# Patient Record
Sex: FEMALE | Race: BLACK OR AFRICAN AMERICAN | NOT HISPANIC OR LATINO | Employment: OTHER | URBAN - METROPOLITAN AREA
[De-identification: names, ages, dates, MRNs, and addresses within clinical notes are randomized per-mention and may not be internally consistent; named-entity substitution may affect disease eponyms.]

---

## 2017-08-09 ENCOUNTER — HOSPITAL ENCOUNTER (INPATIENT)
Facility: HOSPITAL | Age: 57
LOS: 3 days | Discharge: HOME | DRG: 607 | End: 2017-08-12
Attending: EMERGENCY MEDICINE | Admitting: HOSPITALIST
Payer: MEDICAID

## 2017-08-09 DIAGNOSIS — M79.3 PANNICULITIS: Primary | ICD-10-CM

## 2017-08-09 DIAGNOSIS — B37.2 CANDIDAL INTERTRIGO: ICD-10-CM

## 2017-08-09 DIAGNOSIS — Z78.9 FAILURE OF OUTPATIENT TREATMENT: ICD-10-CM

## 2017-08-09 LAB
ALBUMIN SERPL BCP-MCNC: 3.1 G/DL
ALP SERPL-CCNC: 311 U/L
ALT SERPL W/O P-5'-P-CCNC: 6 U/L
ANION GAP SERPL CALC-SCNC: 17 MMOL/L
AST SERPL-CCNC: 54 U/L
BACTERIA #/AREA URNS HPF: ABNORMAL /HPF
BASOPHILS # BLD AUTO: 0.02 K/UL
BASOPHILS NFR BLD: 0.2 %
BILIRUB SERPL-MCNC: 1.6 MG/DL
BILIRUB UR QL STRIP: NEGATIVE
BUN SERPL-MCNC: 7 MG/DL
CALCIUM SERPL-MCNC: 8.9 MG/DL
CHLORIDE SERPL-SCNC: 96 MMOL/L
CLARITY UR: ABNORMAL
CO2 SERPL-SCNC: 23 MMOL/L
COLOR UR: YELLOW
CREAT SERPL-MCNC: 0.8 MG/DL
DIFFERENTIAL METHOD: ABNORMAL
EOSINOPHIL # BLD AUTO: 0.1 K/UL
EOSINOPHIL NFR BLD: 0.6 %
ERYTHROCYTE [DISTWIDTH] IN BLOOD BY AUTOMATED COUNT: 16.8 %
EST. GFR  (AFRICAN AMERICAN): >60 ML/MIN/1.73 M^2
EST. GFR  (NON AFRICAN AMERICAN): >60 ML/MIN/1.73 M^2
GLUCOSE SERPL-MCNC: 92 MG/DL
GLUCOSE UR QL STRIP: NEGATIVE
HCT VFR BLD AUTO: 25.2 %
HGB BLD-MCNC: 8.5 G/DL
HGB UR QL STRIP: ABNORMAL
KETONES UR QL STRIP: ABNORMAL
LEUKOCYTE ESTERASE UR QL STRIP: ABNORMAL
LYMPHOCYTES # BLD AUTO: 2.8 K/UL
LYMPHOCYTES NFR BLD: 32.9 %
MCH RBC QN AUTO: 37.8 PG
MCHC RBC AUTO-ENTMCNC: 33.7 G/DL
MCV RBC AUTO: 112 FL
MICROSCOPIC COMMENT: ABNORMAL
MONOCYTES # BLD AUTO: 0.6 K/UL
MONOCYTES NFR BLD: 7.7 %
NEUTROPHILS # BLD AUTO: 4.9 K/UL
NEUTROPHILS NFR BLD: 58.6 %
NITRITE UR QL STRIP: NEGATIVE
PH UR STRIP: 6 [PH] (ref 5–8)
PLATELET # BLD AUTO: 130 K/UL
PMV BLD AUTO: 10.4 FL
POTASSIUM SERPL-SCNC: 4 MMOL/L
PROT SERPL-MCNC: 7.4 G/DL
PROT UR QL STRIP: NEGATIVE
RBC # BLD AUTO: 2.25 M/UL
RBC #/AREA URNS HPF: 1 /HPF (ref 0–4)
SODIUM SERPL-SCNC: 136 MMOL/L
SP GR UR STRIP: 1.02 (ref 1–1.03)
SQUAMOUS #/AREA URNS HPF: 3 /HPF
URN SPEC COLLECT METH UR: ABNORMAL
UROBILINOGEN UR STRIP-ACNC: ABNORMAL EU/DL
WBC # BLD AUTO: 8.36 K/UL
WBC #/AREA URNS HPF: 6 /HPF (ref 0–5)

## 2017-08-09 PROCEDURE — 12000002 HC ACUTE/MED SURGE SEMI-PRIVATE ROOM

## 2017-08-09 PROCEDURE — 96367 TX/PROPH/DG ADDL SEQ IV INF: CPT

## 2017-08-09 PROCEDURE — 81000 URINALYSIS NONAUTO W/SCOPE: CPT

## 2017-08-09 PROCEDURE — 85025 COMPLETE CBC W/AUTO DIFF WBC: CPT

## 2017-08-09 PROCEDURE — 96365 THER/PROPH/DIAG IV INF INIT: CPT

## 2017-08-09 PROCEDURE — 96366 THER/PROPH/DIAG IV INF ADDON: CPT

## 2017-08-09 PROCEDURE — 63600175 PHARM REV CODE 636 W HCPCS: Performed by: PHYSICIAN ASSISTANT

## 2017-08-09 PROCEDURE — 25000003 PHARM REV CODE 250: Performed by: PHYSICIAN ASSISTANT

## 2017-08-09 PROCEDURE — 80053 COMPREHEN METABOLIC PANEL: CPT

## 2017-08-09 PROCEDURE — 99285 EMERGENCY DEPT VISIT HI MDM: CPT | Mod: 25

## 2017-08-09 RX ORDER — AMLODIPINE BESYLATE 10 MG/1
10 TABLET ORAL DAILY
COMMUNITY
End: 2017-10-14 | Stop reason: ALTCHOICE

## 2017-08-09 RX ORDER — ESCITALOPRAM OXALATE 20 MG/1
20 TABLET ORAL DAILY
COMMUNITY

## 2017-08-09 RX ADMIN — VANCOMYCIN HYDROCHLORIDE 1750 MG: 1 INJECTION, POWDER, LYOPHILIZED, FOR SOLUTION INTRAVENOUS at 06:08

## 2017-08-09 RX ADMIN — PIPERACILLIN SODIUM AND TAZOBACTAM SODIUM 4.5 G: 4; .5 INJECTION, POWDER, FOR SOLUTION INTRAVENOUS at 05:08

## 2017-08-09 NOTE — ED TRIAGE NOTES
"Reports taking "fld pill". Here today  C/o c/o wound to groin and lower abd. ALSo c/o lower back pain.  Visited urgent care 5 days ago. Med prescribed. (nystatin). Med stopped bleeding.   "

## 2017-08-09 NOTE — ED PROVIDER NOTES
Encounter Date: 8/9/2017    SCRIBE #1 NOTE: I, Gabrielle Hughes, am scribing for, and in the presence of,  Antonio Sears PA-C. I have scribed the following portions of the note - Other sections scribed: HPI and ROS.       History     Chief Complaint   Patient presents with    Wound Infection     States she has a wound on her stomach that started as a sore and has gotten bigger     CC: Wound Infection    HPI: This 57 y.o. female with medical history of depression and hypertension presents to the ED c/o a wound infection to the abdomen for the past 1x week. Pt reports that she was recently evaluated at an Urgent Care facility for the symptoms on Friday (8/4/17) and notes that she was prescribed antibiotics (bactrim) as well as nystatin for treatment without any relief as the symptoms are worsening. She adds that she is also experiencing cramping pain to the back. Symptoms are acute, constant and mild (2/10). Pt notes that she has never experienced these symptoms before. Pt denies dysuria and history of diabetes mellitus. No other associated symptoms.           The history is provided by the patient. No  was used.     Review of patient's allergies indicates:  No Known Allergies  Past Medical History:   Diagnosis Date    Depression     Hypertension      Past Surgical History:   Procedure Laterality Date    HYSTERECTOMY      TOTAL HIP ARTHROPLASTY       History reviewed. No pertinent family history.  Social History   Substance Use Topics    Smoking status: Former Smoker    Smokeless tobacco: Never Used    Alcohol use Yes      Comment: QOD     Review of Systems   Constitutional: Negative for fever.   HENT: Negative for sore throat.    Respiratory: Negative for shortness of breath.    Cardiovascular: Negative for chest pain.   Gastrointestinal: Negative for nausea.   Genitourinary: Negative for dysuria.   Musculoskeletal: Positive for back pain (cramping).   Skin: Positive for wound (to the  abdomen). Negative for rash.   Neurological: Negative for weakness.       Physical Exam     Initial Vitals [08/09/17 1454]   BP Pulse Resp Temp SpO2   127/85 103 18 98.6 °F (37 °C) 95 %      MAP       99         Physical Exam    Nursing note and vitals reviewed.  Constitutional: She appears well-developed and well-nourished. She is not diaphoretic. No distress.   HENT:   Head: Normocephalic and atraumatic.   Eyes: Conjunctivae and EOM are normal. Pupils are equal, round, and reactive to light.   Neck: Normal range of motion. Neck supple. No tracheal deviation present.   Cardiovascular: Normal heart sounds.   Pulmonary/Chest: Breath sounds normal. No stridor. No respiratory distress.   Abdominal: Soft. Bowel sounds are normal. She exhibits no distension.   Lymphadenopathy:     She has no cervical adenopathy.   Neurological: She is alert and oriented to person, place, and time.   Skin: Skin is warm and dry. Capillary refill takes less than 2 seconds.   Significant skin tears and erythema to intertriginous areas of pannus and inguinal region. No obvious areas of fluctuance or induration. Foul-smelling.   Psychiatric: She has a normal mood and affect. Her behavior is normal. Judgment and thought content normal.             ED Course   Procedures  Labs Reviewed   CBC W/ AUTO DIFFERENTIAL - Abnormal; Notable for the following:        Result Value    RBC 2.25 (*)     Hemoglobin 8.5 (*)     Hematocrit 25.2 (*)      (*)     MCH 37.8 (*)     RDW 16.8 (*)     Platelets 130 (*)     All other components within normal limits   COMPREHENSIVE METABOLIC PANEL - Abnormal; Notable for the following:     Albumin 3.1 (*)     Total Bilirubin 1.6 (*)     Alkaline Phosphatase 311 (*)     AST 54 (*)     ALT 6 (*)     Anion Gap 17 (*)     All other components within normal limits   URINALYSIS             Medical Decision Making:   Initial Assessment:   58yo f with chief complaint wound infection x 1 week. No fever.   Differential  Diagnosis:   Skin infection, cellulitis, yeast infection, carbuncle, furuncle, panniculitis  Clinical Tests:   Lab Tests: Ordered  ED Management:  Patient overall well-appearing, in no acute distress, afebrile, vitals within normal limits.    Patient originally presented to an urgent care with similar complaint.  She was started on fluconazole by mouth, Bactrim by mouth, in addition to nystatin powder.  Patient is utilized this regimen for 5 days.  She states she feels as if rash has not improved.   states he feels as if the rash and the smell have gotten worse.  She denies fever or chills.  Patient denies abdominal pain, however does state that wounds are tender to palpation.  She denies nausea or vomiting.  She denies change in bowel habits.  She does admit to back pain as well, which she suspects is from laying down.    This is a 57-year-old nondiabetic female with panniculitis, with some degree of candidal intertrigo, who has failed outpatient therapy.  CBC without evidence of infection, although she is anemic.  Patient denies history of anemia.  She denies hemoptysis, denies melena, denies history of GI issues or suspicion for bleeding.  CMP with elevated out, alk phosphatase, otherwise relatively normal metabolic panel. I did call and speak with  who agreed to accept her upstairs for IV abx and further care. Will place inpatient consult to wound care for further management. I did have conversation with patient and  concerning diagnosis and plan. They do understand and are amenable to admit.   Other:   I have discussed this case with another health care provider.       <> Summary of the Discussion: I have discussed this case with Dr. Young.  He did personally examined the patient.            Scribe Attestation:   Scribe #1: I performed the above scribed service and the documentation accurately describes the services I performed. I attest to the accuracy of the note.    Attending  Attestation:     Physician Attestation Statement for NP/PA:   I have conducted a face to face encounter with this patient in addition to the NP/PA, due to NP/PA Request    Other NP/PA Attestation Additions:      Medical Decision Makin-year-old female presents with foul-smelling discharge from pannus.  Multiple excoriations and foul-smelling discharge.  Patient is already been on antibiotics.  I agree with plan for admission due to failed outpatient treatment.       Physician Attestation for Scribe:  Physician Attestation Statement for Scribe #1: I, Antonio Sears PA-C, reviewed documentation, as scribed by Gabrielle Hughes in my presence, and it is both accurate and complete.                 ED Course     Clinical Impression:   The primary encounter diagnosis was Panniculitis. Diagnoses of Candidal intertrigo and Failure of outpatient treatment were also pertinent to this visit.    Disposition:   Disposition: Admitted  Condition: Stable                        Antonio Sears PA-C  17 [id number]       Brian Young MD  17 [id number]

## 2017-08-10 PROBLEM — I10 ESSENTIAL HYPERTENSION: Status: ACTIVE | Noted: 2017-08-10

## 2017-08-10 PROBLEM — F32.A DEPRESSION: Status: ACTIVE | Noted: 2017-08-10

## 2017-08-10 LAB
ALBUMIN SERPL BCP-MCNC: 2.7 G/DL
ALP SERPL-CCNC: 261 U/L
ALT SERPL W/O P-5'-P-CCNC: 6 U/L
ANION GAP SERPL CALC-SCNC: 13 MMOL/L
AST SERPL-CCNC: 44 U/L
BASOPHILS # BLD AUTO: 0.02 K/UL
BASOPHILS NFR BLD: 0.3 %
BILIRUB SERPL-MCNC: 1.5 MG/DL
BUN SERPL-MCNC: 6 MG/DL
CALCIUM SERPL-MCNC: 8.6 MG/DL
CHLORIDE SERPL-SCNC: 98 MMOL/L
CO2 SERPL-SCNC: 23 MMOL/L
CREAT SERPL-MCNC: 0.8 MG/DL
DIFFERENTIAL METHOD: ABNORMAL
EOSINOPHIL # BLD AUTO: 0.1 K/UL
EOSINOPHIL NFR BLD: 1.5 %
ERYTHROCYTE [DISTWIDTH] IN BLOOD BY AUTOMATED COUNT: 15.8 %
EST. GFR  (AFRICAN AMERICAN): >60 ML/MIN/1.73 M^2
EST. GFR  (NON AFRICAN AMERICAN): >60 ML/MIN/1.73 M^2
GLUCOSE SERPL-MCNC: 83 MG/DL
HCT VFR BLD AUTO: 22.2 %
HGB BLD-MCNC: 7.8 G/DL
LYMPHOCYTES # BLD AUTO: 2.3 K/UL
LYMPHOCYTES NFR BLD: 31.1 %
MCH RBC QN AUTO: 38.6 PG
MCHC RBC AUTO-ENTMCNC: 35.1 G/DL
MCV RBC AUTO: 110 FL
MONOCYTES # BLD AUTO: 0.6 K/UL
MONOCYTES NFR BLD: 7.4 %
NEUTROPHILS # BLD AUTO: 4.4 K/UL
NEUTROPHILS NFR BLD: 59.4 %
PLATELET # BLD AUTO: 126 K/UL
PMV BLD AUTO: 10.1 FL
POTASSIUM SERPL-SCNC: 3.5 MMOL/L
PROT SERPL-MCNC: 6.4 G/DL
RBC # BLD AUTO: 2.02 M/UL
SODIUM SERPL-SCNC: 134 MMOL/L
WBC # BLD AUTO: 7.45 K/UL

## 2017-08-10 PROCEDURE — 25000003 PHARM REV CODE 250: Performed by: PHYSICIAN ASSISTANT

## 2017-08-10 PROCEDURE — S0028 INJECTION, FAMOTIDINE, 20 MG: HCPCS | Performed by: PHYSICIAN ASSISTANT

## 2017-08-10 PROCEDURE — 11000001 HC ACUTE MED/SURG PRIVATE ROOM

## 2017-08-10 PROCEDURE — 63600175 PHARM REV CODE 636 W HCPCS: Performed by: PHYSICIAN ASSISTANT

## 2017-08-10 PROCEDURE — 25000003 PHARM REV CODE 250: Performed by: HOSPITALIST

## 2017-08-10 PROCEDURE — 36415 COLL VENOUS BLD VENIPUNCTURE: CPT

## 2017-08-10 PROCEDURE — 80053 COMPREHEN METABOLIC PANEL: CPT

## 2017-08-10 PROCEDURE — 85025 COMPLETE CBC W/AUTO DIFF WBC: CPT

## 2017-08-10 RX ORDER — NYSTATIN 100000 [USP'U]/G
POWDER TOPICAL 2 TIMES DAILY
Status: DISCONTINUED | OUTPATIENT
Start: 2017-08-10 | End: 2017-08-10

## 2017-08-10 RX ORDER — FAMOTIDINE 10 MG/ML
20 INJECTION INTRAVENOUS EVERY 12 HOURS
Status: DISCONTINUED | OUTPATIENT
Start: 2017-08-10 | End: 2017-08-12 | Stop reason: HOSPADM

## 2017-08-10 RX ORDER — POLYETHYLENE GLYCOL 3350 17 G/17G
17 POWDER, FOR SOLUTION ORAL DAILY
Status: DISCONTINUED | OUTPATIENT
Start: 2017-08-10 | End: 2017-08-12 | Stop reason: HOSPADM

## 2017-08-10 RX ORDER — SODIUM CHLORIDE 9 MG/ML
INJECTION, SOLUTION INTRAVENOUS CONTINUOUS
Status: DISCONTINUED | OUTPATIENT
Start: 2017-08-10 | End: 2017-08-12 | Stop reason: HOSPADM

## 2017-08-10 RX ORDER — ONDANSETRON 2 MG/ML
4 INJECTION INTRAMUSCULAR; INTRAVENOUS EVERY 12 HOURS PRN
Status: DISCONTINUED | OUTPATIENT
Start: 2017-08-10 | End: 2017-08-12 | Stop reason: HOSPADM

## 2017-08-10 RX ORDER — HYDROCODONE BITARTRATE AND ACETAMINOPHEN 5; 325 MG/1; MG/1
1 TABLET ORAL EVERY 4 HOURS PRN
Status: DISCONTINUED | OUTPATIENT
Start: 2017-08-10 | End: 2017-08-12 | Stop reason: HOSPADM

## 2017-08-10 RX ORDER — ESCITALOPRAM OXALATE 10 MG/1
20 TABLET ORAL DAILY
Status: DISCONTINUED | OUTPATIENT
Start: 2017-08-10 | End: 2017-08-12 | Stop reason: HOSPADM

## 2017-08-10 RX ORDER — AMLODIPINE BESYLATE 5 MG/1
10 TABLET ORAL DAILY
Status: DISCONTINUED | OUTPATIENT
Start: 2017-08-10 | End: 2017-08-12 | Stop reason: HOSPADM

## 2017-08-10 RX ORDER — MORPHINE SULFATE 10 MG/ML
4 INJECTION INTRAMUSCULAR; INTRAVENOUS; SUBCUTANEOUS EVERY 4 HOURS PRN
Status: DISCONTINUED | OUTPATIENT
Start: 2017-08-10 | End: 2017-08-12 | Stop reason: HOSPADM

## 2017-08-10 RX ORDER — ACETAMINOPHEN 325 MG/1
650 TABLET ORAL EVERY 8 HOURS PRN
Status: DISCONTINUED | OUTPATIENT
Start: 2017-08-10 | End: 2017-08-12 | Stop reason: HOSPADM

## 2017-08-10 RX ADMIN — HYDROCODONE BITARTRATE AND ACETAMINOPHEN 1 TABLET: 5; 325 TABLET ORAL at 04:08

## 2017-08-10 RX ADMIN — VANCOMYCIN HYDROCHLORIDE 1000 MG: 1 INJECTION, POWDER, LYOPHILIZED, FOR SOLUTION INTRAVENOUS at 08:08

## 2017-08-10 RX ADMIN — PIPERACILLIN SODIUM AND TAZOBACTAM SODIUM 4.5 G: 4; .5 INJECTION, POWDER, LYOPHILIZED, FOR SOLUTION INTRAVENOUS at 01:08

## 2017-08-10 RX ADMIN — VANCOMYCIN HYDROCHLORIDE 1000 MG: 1 INJECTION, POWDER, LYOPHILIZED, FOR SOLUTION INTRAVENOUS at 05:08

## 2017-08-10 RX ADMIN — FAMOTIDINE 20 MG: 10 INJECTION, SOLUTION INTRAVENOUS at 08:08

## 2017-08-10 RX ADMIN — NYSTATIN: 100000 POWDER TOPICAL at 09:08

## 2017-08-10 RX ADMIN — PIPERACILLIN SODIUM AND TAZOBACTAM SODIUM 4.5 G: 4; .5 INJECTION, POWDER, LYOPHILIZED, FOR SOLUTION INTRAVENOUS at 11:08

## 2017-08-10 RX ADMIN — SODIUM CHLORIDE: 0.9 INJECTION, SOLUTION INTRAVENOUS at 01:08

## 2017-08-10 RX ADMIN — MORPHINE SULFATE 4 MG: 10 INJECTION INTRAVENOUS at 07:08

## 2017-08-10 RX ADMIN — PIPERACILLIN SODIUM AND TAZOBACTAM SODIUM 4.5 G: 4; .5 INJECTION, POWDER, LYOPHILIZED, FOR SOLUTION INTRAVENOUS at 09:08

## 2017-08-10 RX ADMIN — POLYETHYLENE GLYCOL 3350 17 G: 17 POWDER, FOR SOLUTION ORAL at 08:08

## 2017-08-10 RX ADMIN — ESCITALOPRAM OXALATE 20 MG: 10 TABLET ORAL at 08:08

## 2017-08-10 RX ADMIN — MICONAZOLE NITRATE: 20 OINTMENT TOPICAL at 09:08

## 2017-08-10 RX ADMIN — MORPHINE SULFATE 4 MG: 10 INJECTION INTRAVENOUS at 11:08

## 2017-08-10 RX ADMIN — FAMOTIDINE 20 MG: 10 INJECTION, SOLUTION INTRAVENOUS at 09:08

## 2017-08-10 RX ADMIN — AMLODIPINE BESYLATE 10 MG: 5 TABLET ORAL at 08:08

## 2017-08-10 RX ADMIN — FAMOTIDINE 20 MG: 10 INJECTION, SOLUTION INTRAVENOUS at 01:08

## 2017-08-10 NOTE — PLAN OF CARE
08/10/17 1507   Discharge Assessment   Assessment Type Discharge Planning Assessment   Confirmed/corrected address and phone number on facesheet? (address is [address] [place] [address] in [place] )   Assessment information obtained from? Patient   Prior to hospitilization cognitive status: Alert/Oriented   Prior to hospitalization functional status: Independent   Current cognitive status: Alert/Oriented   Current Functional Status: Independent   Arrived From home or self-care   Lives With alone   Able to Return to Prior Arrangements yes   Is patient able to care for self after discharge? Yes   How many people do you have in your home that can help with your care after discharge? other (see comments)  (to follow up with pt to get contact number)   Patient's perception of discharge disposition home or selfcare   Readmission Within The Last 30 Days no previous admission in last 30 days   Patient currently being followed by outpatient case management? No   Patient currently receives home health services? No   Does the patient currently use HME? No   Patient currently receives private duty nursing? N/A   Patient currently receives any other outside agency services? No   Equipment Currently Used at Home none   Do you have any problems affording any of your prescribed medications? No   Is the patient taking medications as prescribed? (TBD)   Do you have any financial concerns preventing you from receiving the healthcare you need? No   Does the patient have transportation to healthcare appointments? Yes   Transportation Available Medicaid transportation;family or friend will provide   On Dialysis? No   Does the patient receive services at the [facility]? No   Are there any open cases? No   Discharge Plan A Home   Discharge Plan B (TBD)   Patient/Family In Agreement With Plan yes

## 2017-08-10 NOTE — NURSING
Was informed that pts IV came out. Attempted to stick patient once and was unsuccessful. Informed charge nurse and states she will start one.

## 2017-08-10 NOTE — PLAN OF CARE
Problem: Skin and Soft Tissue Infection (Adult)  Goal: Signs and Symptoms of Listed Potential Problems Will be Absent, Minimized or Managed (Skin and Soft Tissue Infection)  Signs and symptoms of listed potential problems will be absent, minimized or managed by discharge/transition of care (reference Skin and Soft Tissue Infection (Adult) CPG).  Outcome: Ongoing (interventions implemented as appropriate)   08/10/17 6880   Skin and Soft Tissue Infection   Problems Assessed (Skin and Soft Tissue Infection) all   Problems Present (Skin and Soft Tissue Infection) infection progression;pain;situational response

## 2017-08-10 NOTE — SUBJECTIVE & OBJECTIVE
Past Medical History:   Diagnosis Date    Depression     Hypertension        Past Surgical History:   Procedure Laterality Date    HYSTERECTOMY      TOTAL HIP ARTHROPLASTY         Review of patient's allergies indicates:  No Known Allergies    No current facility-administered medications on file prior to encounter.      No current outpatient prescriptions on file prior to encounter.     Family History     None        Social History Main Topics    Smoking status: Former Smoker    Smokeless tobacco: Never Used    Alcohol use Yes      Comment: QOD    Drug use: Unknown    Sexual activity: Not on file     Review of Systems   Constitutional: Negative for activity change and appetite change.   HENT: Negative for congestion and dental problem.    Eyes: Negative for discharge and itching.   Respiratory: Negative for apnea and chest tightness.    Cardiovascular: Negative for chest pain and leg swelling.   Gastrointestinal: Negative for abdominal distention and abdominal pain.   Endocrine: Negative for cold intolerance and heat intolerance.   Genitourinary: Negative for difficulty urinating and dyspareunia.   Musculoskeletal: Positive for back pain. Negative for arthralgias.   Skin:        Panniculitis in lower abdomen.   Allergic/Immunologic: Negative for environmental allergies and food allergies.   Neurological: Negative for dizziness and facial asymmetry.   Hematological: Negative for adenopathy. Does not bruise/bleed easily.   Psychiatric/Behavioral: Negative for agitation and behavioral problems.     Objective:     Vital Signs (Most Recent):  Temp: 98.3 °F (36.8 °C) (08/10/17 0033)  Pulse: 93 (08/10/17 0033)  Resp: 18 (08/10/17 0033)  BP: 119/60 (08/10/17 0033)  SpO2: 98 % (08/10/17 0033) Vital Signs (24h Range):  Temp:  [96 °F (35.6 °C)-98.6 °F (37 °C)] 98.3 °F (36.8 °C)  Pulse:  [] 93  Resp:  [18] 18  SpO2:  [95 %-98 %] 98 %  BP: (118-136)/(60-85) 119/60     Weight: 77.2 kg (170 lb 4.8 oz)  Body mass  index is 31.15 kg/m².    Physical Exam   Constitutional: She is oriented to person, place, and time. No distress.   HENT:   Head: Atraumatic.   Eyes: EOM are normal. Pupils are equal, round, and reactive to light.   Neck: Normal range of motion. Neck supple.   Cardiovascular: Normal rate and regular rhythm.    Pulmonary/Chest: Effort normal and breath sounds normal.   Abdominal: Soft. She exhibits no distension. There is no tenderness.   Musculoskeletal: Normal range of motion. She exhibits no edema or deformity.   Neurological: She is oriented to person, place, and time. No cranial nerve deficit. Coordination normal.   Skin: Skin is warm. Rash noted. She is not diaphoretic. There is erythema.   Extensive lower abdomen paniculitis with candida,involved entire lower abdomen,extending to groin area.   Psychiatric: She has a normal mood and affect. Her behavior is normal.        Significant Labs:   BMP:   Recent Labs  Lab 08/09/17  1718   GLU 92      K 4.0   CL 96   CO2 23   BUN 7   CREATININE 0.8   CALCIUM 8.9     CBC:   Recent Labs  Lab 08/09/17  1718   WBC 8.36   HGB 8.5*   HCT 25.2*   *       Significant Imaging:reviewed

## 2017-08-10 NOTE — CONSULTS
A 57 year old female admitted 8/9/17 from home with failed outpatient treatment of panniculitis; candidal intertrigo  PMH: Depression; HTN  8/10 WBC 7.45 Hgb 7.8 Hct 22.2 Alb 2.7  8/4 Treatment at an Urgent Care facility with Bactrim, nystatin powder, and fluconazole.   Treatment with IV antibiotics and topical nystatin powder  Consulted for wound care of panniculitis and candidal intertrigo  Assessment:  Redness in abdominal fold and bilateral groins. Tears in epidermis. Slightly foul odor. States skin feeling much better since starting antibiotics.   Reports being continent and ambulatory with a walker. Doesn't wear underwear or diapers. Acknowledges sweating excessively and sleeping with air conditioner and fan.   Hygiene by sponge bath. Doesn't shower or bath.   No skin breakdown in axilla or under left breast  Treatment:  Instead of powder in skin folds, use CriticAid Clear AF due to barrier properties.   Discussed treatment plan with patient.

## 2017-08-10 NOTE — NURSING
Pt arrived from ED via wheelchair accompanied by  and was able to walk to pt bed. Pt appears in NAD at this time, bed in locked and low position and call bell within reach. Will continue with current plan of care.

## 2017-08-10 NOTE — NURSING
Report received from night nurse. Visualized patient and assessed patient's overall condition and appearance. NAD noted. Will continue to monitor.

## 2017-08-10 NOTE — ASSESSMENT & PLAN NOTE
patient has extensive panniculitis with candida intertrigo,patient has been started on broad spectrum IV Abx for failed out patient treatment for extensive panniculitis in lower abdomen.ash loyolaa been consulted,she is not septic.

## 2017-08-10 NOTE — HPI
This 57 y.o. female with medical history of depression and hypertension presents to the ED c/o a wound infection to the abdomen for the past 1x week. Pt reports that she was recently evaluated at an Urgent Care facility for the symptoms on Friday (8/4/17) and notes that she was prescribed antibiotics (bactrim) as well as nystatin for treatment without any relief as the symptoms are worsening. She adds that she is also experiencing cramping pain to the back. Symptoms are acute, constant and mild (2/10). Pt notes that she has never experienced these symptoms before. Pt denies dysuria and history of diabetes mellitus. No other associated symptoms.on Exam,patient has extensive panniculitis with candida intertrigo,patient has been started on broad spectrum IV Abx for failed out patient treatment for extensive panniculitis in lower abdomen.

## 2017-08-10 NOTE — H&P
[facility] Hospital Medicine  History & Physical    Patient Name: Kylie Aleman  MRN: [id number]  Admission Date: 8/9/2017  Attending Physician: Ranjana Elise MD   Primary Care Provider: Shaggy Cook RT         Patient information was obtained from patient and ER records.     Subjective:     Principal Problem:Panniculitis    Chief Complaint:   Chief Complaint   Patient presents with    Wound Infection     States she has a wound on her stomach that started as a sore and has gotten bigger        HPI: This 57 y.o. female with medical history of depression and hypertension presents to the ED c/o a wound infection to the abdomen for the past 1x week. Pt reports that she was recently evaluated at an Urgent Care facility for the symptoms on Friday (8/4/17) and notes that she was prescribed antibiotics (bactrim) as well as nystatin for treatment without any relief as the symptoms are worsening. She adds that she is also experiencing cramping pain to the back. Symptoms are acute, constant and mild (2/10). Pt notes that she has never experienced these symptoms before. Pt denies dysuria and history of diabetes mellitus. No other associated symptoms.on Exam,patient has extensive panniculitis with candida intertrigo,patient has been started on broad spectrum IV Abx for failed out patient treatment for extensive panniculitis in lower abdomen.    Past Medical History:   Diagnosis Date    Depression     Hypertension        Past Surgical History:   Procedure Laterality Date    HYSTERECTOMY      TOTAL HIP ARTHROPLASTY         Review of patient's allergies indicates:  No Known Allergies    No current facility-administered medications on file prior to encounter.      No current outpatient prescriptions on file prior to encounter.     Family History     None        Social History Main Topics    Smoking status: Former Smoker    Smokeless tobacco: Never Used    Alcohol use Yes      Comment: QOD     Drug use: Unknown    Sexual activity: Not on file     Review of Systems   Constitutional: Negative for activity change and appetite change.   HENT: Negative for congestion and dental problem.    Eyes: Negative for discharge and itching.   Respiratory: Negative for apnea and chest tightness.    Cardiovascular: Negative for chest pain and leg swelling.   Gastrointestinal: Negative for abdominal distention and abdominal pain.   Endocrine: Negative for cold intolerance and heat intolerance.   Genitourinary: Negative for difficulty urinating and dyspareunia.   Musculoskeletal: Positive for back pain. Negative for arthralgias.   Skin:        Panniculitis in lower abdomen.   Allergic/Immunologic: Negative for environmental allergies and food allergies.   Neurological: Negative for dizziness and facial asymmetry.   Hematological: Negative for adenopathy. Does not bruise/bleed easily.   Psychiatric/Behavioral: Negative for agitation and behavioral problems.     Objective:     Vital Signs (Most Recent):  Temp: 98.3 °F (36.8 °C) (08/10/17 0033)  Pulse: 93 (08/10/17 0033)  Resp: 18 (08/10/17 0033)  BP: 119/60 (08/10/17 0033)  SpO2: 98 % (08/10/17 0033) Vital Signs (24h Range):  Temp:  [96 °F (35.6 °C)-98.6 °F (37 °C)] 98.3 °F (36.8 °C)  Pulse:  [] 93  Resp:  [18] 18  SpO2:  [95 %-98 %] 98 %  BP: (118-136)/(60-85) 119/60     Weight: 77.2 kg (170 lb 4.8 oz)  Body mass index is 31.15 kg/m².    Physical Exam   Constitutional: She is oriented to person, place, and time. No distress.   HENT:   Head: Atraumatic.   Eyes: EOM are normal. Pupils are equal, round, and reactive to light.   Neck: Normal range of motion. Neck supple.   Cardiovascular: Normal rate and regular rhythm.    Pulmonary/Chest: Effort normal and breath sounds normal.   Abdominal: Soft. She exhibits no distension. There is no tenderness.   Musculoskeletal: Normal range of motion. She exhibits no edema or deformity.   Neurological: She is oriented to person,  place, and time. No cranial nerve deficit. Coordination normal.   Skin: Skin is warm. Rash noted. She is not diaphoretic. There is erythema.   Extensive lower abdomen paniculitis with candida,involved entire lower abdomen,extending to groin area.   Psychiatric: She has a normal mood and affect. Her behavior is normal.        Significant Labs:   BMP:   Recent Labs  Lab 08/09/17  1718   GLU 92      K 4.0   CL 96   CO2 23   BUN 7   CREATININE 0.8   CALCIUM 8.9     CBC:   Recent Labs  Lab 08/09/17  1718   WBC 8.36   HGB 8.5*   HCT 25.2*   *       Significant Imaging:reviewed    Assessment/Plan:     Depression    On Lexapro.          Essential hypertension    On Amlodipine.          * Panniculitis    patient has extensive panniculitis with candida intertrigo,patient has been started on broad spectrum IV Abx for failed out patient treatment for extensive panniculitis in lower abdomen.ash adrian hasa been consulted,she is not septic.            VTE Risk Mitigation         Ordered     Medium Risk of VTE  Once      08/10/17 0056     Place sequential compression device  Until discontinued      08/10/17 0056     Place MATTIE hose  Until discontinued      08/10/17 0056             Ranjana Elise MD  Department of Hospital Medicine   [facility]

## 2017-08-11 LAB
ALBUMIN SERPL BCP-MCNC: 2.5 G/DL
ALP SERPL-CCNC: 226 U/L
ALT SERPL W/O P-5'-P-CCNC: 10 U/L
ANION GAP SERPL CALC-SCNC: 12 MMOL/L
AST SERPL-CCNC: 42 U/L
BASOPHILS # BLD AUTO: 0.02 K/UL
BASOPHILS NFR BLD: 0.3 %
BILIRUB SERPL-MCNC: 1.4 MG/DL
BUN SERPL-MCNC: 5 MG/DL
CALCIUM SERPL-MCNC: 8.4 MG/DL
CHLORIDE SERPL-SCNC: 100 MMOL/L
CO2 SERPL-SCNC: 25 MMOL/L
CREAT SERPL-MCNC: 0.9 MG/DL
DIFFERENTIAL METHOD: ABNORMAL
EOSINOPHIL # BLD AUTO: 0.1 K/UL
EOSINOPHIL NFR BLD: 1.2 %
ERYTHROCYTE [DISTWIDTH] IN BLOOD BY AUTOMATED COUNT: 15.9 %
EST. GFR  (AFRICAN AMERICAN): >60 ML/MIN/1.73 M^2
EST. GFR  (NON AFRICAN AMERICAN): >60 ML/MIN/1.73 M^2
GLUCOSE SERPL-MCNC: 108 MG/DL
HCT VFR BLD AUTO: 22 %
HGB BLD-MCNC: 7.9 G/DL
LYMPHOCYTES # BLD AUTO: 1.9 K/UL
LYMPHOCYTES NFR BLD: 32.4 %
MCH RBC QN AUTO: 39.7 PG
MCHC RBC AUTO-ENTMCNC: 35.9 G/DL
MCV RBC AUTO: 111 FL
MONOCYTES # BLD AUTO: 0.5 K/UL
MONOCYTES NFR BLD: 8.1 %
NEUTROPHILS # BLD AUTO: 3.4 K/UL
NEUTROPHILS NFR BLD: 57.7 %
PLATELET # BLD AUTO: 162 K/UL
PMV BLD AUTO: 9.8 FL
POTASSIUM SERPL-SCNC: 3 MMOL/L
PROT SERPL-MCNC: 6.1 G/DL
RBC # BLD AUTO: 1.99 M/UL
SODIUM SERPL-SCNC: 137 MMOL/L
VANCOMYCIN TROUGH SERPL-MCNC: 18.1 UG/ML
WBC # BLD AUTO: 5.96 K/UL

## 2017-08-11 PROCEDURE — 36415 COLL VENOUS BLD VENIPUNCTURE: CPT

## 2017-08-11 PROCEDURE — 85025 COMPLETE CBC W/AUTO DIFF WBC: CPT

## 2017-08-11 PROCEDURE — 80053 COMPREHEN METABOLIC PANEL: CPT

## 2017-08-11 PROCEDURE — 63600175 PHARM REV CODE 636 W HCPCS: Performed by: HOSPITALIST

## 2017-08-11 PROCEDURE — 25000003 PHARM REV CODE 250: Performed by: PHYSICIAN ASSISTANT

## 2017-08-11 PROCEDURE — 11000001 HC ACUTE MED/SURG PRIVATE ROOM

## 2017-08-11 PROCEDURE — 25000003 PHARM REV CODE 250: Performed by: HOSPITALIST

## 2017-08-11 PROCEDURE — 80202 ASSAY OF VANCOMYCIN: CPT

## 2017-08-11 PROCEDURE — S0028 INJECTION, FAMOTIDINE, 20 MG: HCPCS | Performed by: PHYSICIAN ASSISTANT

## 2017-08-11 PROCEDURE — 63600175 PHARM REV CODE 636 W HCPCS: Performed by: PHYSICIAN ASSISTANT

## 2017-08-11 RX ORDER — POTASSIUM CHLORIDE 20 MEQ/1
40 TABLET, EXTENDED RELEASE ORAL ONCE
Status: COMPLETED | OUTPATIENT
Start: 2017-08-11 | End: 2017-08-11

## 2017-08-11 RX ORDER — HEPARIN SODIUM 5000 [USP'U]/ML
5000 INJECTION, SOLUTION INTRAVENOUS; SUBCUTANEOUS EVERY 8 HOURS
Status: DISCONTINUED | OUTPATIENT
Start: 2017-08-11 | End: 2017-08-12 | Stop reason: HOSPADM

## 2017-08-11 RX ADMIN — PIPERACILLIN SODIUM AND TAZOBACTAM SODIUM 4.5 G: 4; .5 INJECTION, POWDER, LYOPHILIZED, FOR SOLUTION INTRAVENOUS at 04:08

## 2017-08-11 RX ADMIN — HEPARIN SODIUM 5000 UNITS: 5000 INJECTION, SOLUTION INTRAVENOUS; SUBCUTANEOUS at 09:08

## 2017-08-11 RX ADMIN — MORPHINE SULFATE 4 MG: 10 INJECTION INTRAVENOUS at 03:08

## 2017-08-11 RX ADMIN — ESCITALOPRAM OXALATE 20 MG: 10 TABLET ORAL at 10:08

## 2017-08-11 RX ADMIN — POTASSIUM CHLORIDE 40 MEQ: 1500 TABLET, EXTENDED RELEASE ORAL at 01:08

## 2017-08-11 RX ADMIN — MICONAZOLE NITRATE: 20 OINTMENT TOPICAL at 10:08

## 2017-08-11 RX ADMIN — VANCOMYCIN HYDROCHLORIDE 1000 MG: 1 INJECTION, POWDER, LYOPHILIZED, FOR SOLUTION INTRAVENOUS at 10:08

## 2017-08-11 RX ADMIN — MORPHINE SULFATE 4 MG: 10 INJECTION INTRAVENOUS at 09:08

## 2017-08-11 RX ADMIN — AMLODIPINE BESYLATE 10 MG: 5 TABLET ORAL at 10:08

## 2017-08-11 RX ADMIN — FAMOTIDINE 20 MG: 10 INJECTION, SOLUTION INTRAVENOUS at 09:08

## 2017-08-11 RX ADMIN — VANCOMYCIN HYDROCHLORIDE 750 MG: 750 INJECTION, POWDER, LYOPHILIZED, FOR SOLUTION INTRAVENOUS at 10:08

## 2017-08-11 RX ADMIN — SODIUM CHLORIDE: 0.9 INJECTION, SOLUTION INTRAVENOUS at 07:08

## 2017-08-11 RX ADMIN — PIPERACILLIN SODIUM AND TAZOBACTAM SODIUM 4.5 G: 4; .5 INJECTION, POWDER, LYOPHILIZED, FOR SOLUTION INTRAVENOUS at 09:08

## 2017-08-11 RX ADMIN — HEPARIN SODIUM 5000 UNITS: 5000 INJECTION, SOLUTION INTRAVENOUS; SUBCUTANEOUS at 01:08

## 2017-08-11 RX ADMIN — MICONAZOLE NITRATE: 20 OINTMENT TOPICAL at 09:08

## 2017-08-11 RX ADMIN — PIPERACILLIN SODIUM AND TAZOBACTAM SODIUM 4.5 G: 4; .5 INJECTION, POWDER, LYOPHILIZED, FOR SOLUTION INTRAVENOUS at 01:08

## 2017-08-11 NOTE — HOSPITAL COURSE
This 57 y.o. female with medical history of depression and hypertension presents to the ED c/o a wound infection to the abdomen for the past 1x week. Pt reports that she was recently evaluated at an Urgent Care facility for the symptoms on Friday (8/4/17) and notes that she was prescribed antibiotics (bactrim) as well as nystatin for treatment without any relief as the symptoms are worsening. She adds that she is also experiencing cramping pain to the back. Symptoms are acute, constant and mild (2/10). Pt notes that she has never experienced these symptoms before. Pt denies dysuria and history of diabetes mellitus. No other associated symptoms.on Exam,patient had extensive panniculitis with candida intertrigo,patient has been started on broad spectrum IV Abx for failed out patient treatment for extensive panniculitis in lower abdomen,also antifungal creams and wound care was following,her panniculitis much improved,she has been discharged home with po Abx,miconazole creams and follow up with PCP in next few days.

## 2017-08-11 NOTE — SUBJECTIVE & OBJECTIVE
Past Medical History:   Diagnosis Date    Depression     Hypertension        Past Surgical History:   Procedure Laterality Date    HYSTERECTOMY      TOTAL HIP ARTHROPLASTY         Review of patient's allergies indicates:  No Known Allergies    No current facility-administered medications on file prior to encounter.      No current outpatient prescriptions on file prior to encounter.     Family History     None        Social History Main Topics    Smoking status: Former Smoker    Smokeless tobacco: Never Used    Alcohol use Yes      Comment: QOD    Drug use: Unknown    Sexual activity: Not on file     Review of Systems   Constitutional: Negative for activity change and appetite change.   HENT: Negative for congestion and dental problem.    Eyes: Negative for discharge and itching.   Respiratory: Negative for apnea and chest tightness.    Cardiovascular: Negative for chest pain and leg swelling.   Gastrointestinal: Negative for abdominal distention and abdominal pain.   Endocrine: Negative for cold intolerance and heat intolerance.   Genitourinary: Negative for difficulty urinating and dyspareunia.   Musculoskeletal: Positive for back pain. Negative for arthralgias.   Skin:        Panniculitis in lower abdomen.   Allergic/Immunologic: Negative for environmental allergies and food allergies.   Neurological: Negative for dizziness and facial asymmetry.   Hematological: Negative for adenopathy. Does not bruise/bleed easily.   Psychiatric/Behavioral: Negative for agitation and behavioral problems.     Objective:     Vital Signs (Most Recent):  Temp: 97.8 °F (36.6 °C) (08/11/17 0701)  Pulse: 90 (08/11/17 0701)  Resp: 18 (08/11/17 0701)  BP: (!) 156/92 (08/11/17 0701)  SpO2: 97 % (08/11/17 0701) Vital Signs (24h Range):  Temp:  [97.1 °F (36.2 °C)-97.8 °F (36.6 °C)] 97.8 °F (36.6 °C)  Pulse:  [90-95] 90  Resp:  [18] 18  SpO2:  [97 %-100 %] 97 %  BP: ()/(53-92) 156/92     Weight: 78.2 kg (172 lb 4.8 oz)  Body  mass index is 31.51 kg/m².    Physical Exam   Constitutional: She is oriented to person, place, and time. No distress.   HENT:   Head: Atraumatic.   Eyes: EOM are normal. Pupils are equal, round, and reactive to light.   Neck: Normal range of motion. Neck supple.   Cardiovascular: Normal rate and regular rhythm.    Pulmonary/Chest: Effort normal and breath sounds normal.   Abdominal: Soft. She exhibits no distension. There is no tenderness.   Musculoskeletal: Normal range of motion. She exhibits no edema or deformity.   Neurological: She is oriented to person, place, and time. No cranial nerve deficit. Coordination normal.   Skin: Skin is warm. Rash noted. She is not diaphoretic. There is erythema.   Extensive lower abdomen paniculitis with candida,involved entire lower abdomen,extending to groin area.   Psychiatric: She has a normal mood and affect. Her behavior is normal.        Significant Labs:   BMP:     Recent Labs  Lab 08/11/17  0518         K 3.0*      CO2 25   BUN 5*   CREATININE 0.9   CALCIUM 8.4*     CBC:     Recent Labs  Lab 08/09/17  1718 08/10/17  0718 08/11/17  0518   WBC 8.36 7.45 5.96   HGB 8.5* 7.8* 7.9*   HCT 25.2* 22.2* 22.0*   * 126* 162       Significant Imaging:reviewed

## 2017-08-11 NOTE — PROGRESS NOTES
TN met with pt to clarify assessment information. Pt previously saw Dr. Lulu Mcnamara as PCP. Pt now has Medicaid and Dr. Mcnamara does not accept Medicaid. Pt will need new PCP. Pt prefers morning appts. Pt uses [facility] in [place].

## 2017-08-11 NOTE — PLAN OF CARE
Problem: Fall Risk (Adult)  Goal: Absence of Falls  Patient will demonstrate the desired outcomes by discharge/transition of care.   Outcome: Ongoing (interventions implemented as appropriate)   08/10/17 1954   Fall Risk (Adult)   Absence of Falls making progress toward outcome       Problem: Patient Care Overview  Goal: Plan of Care Review  Outcome: Ongoing (interventions implemented as appropriate)   08/10/17 1954   Coping/Psychosocial   Plan Of Care Reviewed With patient       Problem: Skin and Soft Tissue Infection (Adult)  Goal: Signs and Symptoms of Listed Potential Problems Will be Absent, Minimized or Managed (Skin and Soft Tissue Infection)  Signs and symptoms of listed potential problems will be absent, minimized or managed by discharge/transition of care (reference Skin and Soft Tissue Infection (Adult) CPG).   Outcome: Ongoing (interventions implemented as appropriate)   08/10/17 1954   Skin and Soft Tissue Infection   Problems Assessed (Skin and Soft Tissue Infection) all   Problems Present (Skin and Soft Tissue Infection) infection progression;situational response

## 2017-08-11 NOTE — NURSING
Report received from BIANCA High. Visualized patient and assessed patient's overall condition and appearance.  No complaints. NAD noted. Will continue to monitor.

## 2017-08-11 NOTE — PROGRESS NOTES
[facility] Hospital Medicine  Progress Note    Patient Name: Kylie Aleman  MRN: [id number]  Patient Class: IP- Inpatient   Admission Date: 8/9/2017  Length of Stay: 2 days  Attending Physician: Ranjana Elise MD  Primary Care Provider: RT El        Subjective:     Principal Problem:Panniculitis    HPI:  This 57 y.o. female with medical history of depression and hypertension presents to the ED c/o a wound infection to the abdomen for the past 1x week. Pt reports that she was recently evaluated at an Urgent Care facility for the symptoms on Friday (8/4/17) and notes that she was prescribed antibiotics (bactrim) as well as nystatin for treatment without any relief as the symptoms are worsening. She adds that she is also experiencing cramping pain to the back. Symptoms are acute, constant and mild (2/10). Pt notes that she has never experienced these symptoms before. Pt denies dysuria and history of diabetes mellitus. No other associated symptoms.on Exam,patient has extensive panniculitis with candida intertrigo,patient has been started on broad spectrum IV Abx for failed out patient treatment for extensive panniculitis in lower abdomen.    Hospital Course:  This 57 y.o. female with medical history of depression and hypertension presents to the ED c/o a wound infection to the abdomen for the past 1x week. Pt reports that she was recently evaluated at an Urgent Care facility for the symptoms on Friday (8/4/17) and notes that she was prescribed antibiotics (bactrim) as well as nystatin for treatment without any relief as the symptoms are worsening. She adds that she is also experiencing cramping pain to the back. Symptoms are acute, constant and mild (2/10). Pt notes that she has never experienced these symptoms before. Pt denies dysuria and history of diabetes mellitus. No other associated symptoms.on Exam,patient has extensive panniculitis with candida intertrigo,patient has  been started on broad spectrum IV Abx for failed out patient treatment for extensive panniculitis in lower abdomen.her panniculitis appear to be improving,[facility] care is following.    Past Medical History:   Diagnosis Date    Depression     Hypertension        Past Surgical History:   Procedure Laterality Date    HYSTERECTOMY      TOTAL HIP ARTHROPLASTY         Review of patient's allergies indicates:  No Known Allergies    No current facility-administered medications on file prior to encounter.      No current outpatient prescriptions on file prior to encounter.     Family History     None        Social History Main Topics    Smoking status: Former Smoker    Smokeless tobacco: Never Used    Alcohol use Yes      Comment: QOD    Drug use: Unknown    Sexual activity: Not on file     Review of Systems   Constitutional: Negative for activity change and appetite change.   HENT: Negative for congestion and dental problem.    Eyes: Negative for discharge and itching.   Respiratory: Negative for apnea and chest tightness.    Cardiovascular: Negative for chest pain and leg swelling.   Gastrointestinal: Negative for abdominal distention and abdominal pain.   Endocrine: Negative for cold intolerance and heat intolerance.   Genitourinary: Negative for difficulty urinating and dyspareunia.   Musculoskeletal: Positive for back pain. Negative for arthralgias.   Skin:        Panniculitis in lower abdomen.   Allergic/Immunologic: Negative for environmental allergies and food allergies.   Neurological: Negative for dizziness and facial asymmetry.   Hematological: Negative for adenopathy. Does not bruise/bleed easily.   Psychiatric/Behavioral: Negative for agitation and behavioral problems.     Objective:     Vital Signs (Most Recent):  Temp: 97.8 °F (36.6 °C) (08/11/17 0701)  Pulse: 90 (08/11/17 0701)  Resp: 18 (08/11/17 0701)  BP: (!) 156/92 (08/11/17 0701)  SpO2: 97 % (08/11/17 0701) Vital Signs (24h Range):  Temp:  [97.1 °F  (36.2 °C)-97.8 °F (36.6 °C)] 97.8 °F (36.6 °C)  Pulse:  [90-95] 90  Resp:  [18] 18  SpO2:  [97 %-100 %] 97 %  BP: ()/(53-92) 156/92     Weight: 78.2 kg (172 lb 4.8 oz)  Body mass index is 31.51 kg/m².    Physical Exam   Constitutional: She is oriented to person, place, and time. No distress.   HENT:   Head: Atraumatic.   Eyes: EOM are normal. Pupils are equal, round, and reactive to light.   Neck: Normal range of motion. Neck supple.   Cardiovascular: Normal rate and regular rhythm.    Pulmonary/Chest: Effort normal and breath sounds normal.   Abdominal: Soft. She exhibits no distension. There is no tenderness.   Musculoskeletal: Normal range of motion. She exhibits no edema or deformity.   Neurological: She is oriented to person, place, and time. No cranial nerve deficit. Coordination normal.   Skin: Skin is warm. Rash noted. She is not diaphoretic. There is erythema.   Extensive lower abdomen paniculitis with candida,involved entire lower abdomen,extending to groin area.   Psychiatric: She has a normal mood and affect. Her behavior is normal.        Significant Labs:   BMP:     Recent Labs  Lab 08/11/17  0518         K 3.0*      CO2 25   BUN 5*   CREATININE 0.9   CALCIUM 8.4*     CBC:     Recent Labs  Lab 08/09/17  1718 08/10/17  0718 08/11/17  0518   WBC 8.36 7.45 5.96   HGB 8.5* 7.8* 7.9*   HCT 25.2* 22.2* 22.0*   * 126* 162       Significant Imaging:reviewed    Assessment/Plan:      * Panniculitis    patient has extensive panniculitis with candida intertrigo,patient has been started on broad spectrum IV Abx for failed out patient treatment for extensive panniculitis in lower abdomen.wound care has been consulted,she is not septic.improving.          Essential hypertension    On Amlodipine.          Depression    On Lexapro.            VTE Risk Mitigation         Ordered     heparin (porcine) injection 5,000 Units  Every 8 hours     Route:  Subcutaneous        08/11/17 1023      Medium Risk of VTE  Once      08/10/17 0056     Place sequential compression device  Until discontinued      08/10/17 0056     Place MATTIE hose  Until discontinued      08/10/17 0056              Ranjana Elise MD  Department of Hospital Medicine   [facility]

## 2017-08-11 NOTE — PT/OT/SLP EVAL
Occupational Therapy  Evaluation/Discahrge    Kylie Aleman   MRN: [id number]   Admitting Diagnosis: Panniculitis    OT Date of Treatment: 08/11/17   OT Start Time: 1417  OT Stop Time: 1439  OT Total Time (min): 22 min    Billable Minutes:  Evaluation 22    Diagnosis: Panniculitis       Past Medical History:   Diagnosis Date    Depression     Hypertension       Past Surgical History:   Procedure Laterality Date    HYSTERECTOMY      TOTAL HIP ARTHROPLASTY         Referring physician: Lore  Date referred to OT: 8/11/17    General Precautions: Standard, fall  Orthopedic Precautions:    Braces:            Patient History:  Living Environment  Lives With: child(loren), adult, spouse  Living Arrangements: house  Equipment Currently Used at Home: walker, rolling, cane, straight    Prior level of function:   Bed Mobility/Transfers: independent  Grooming: independent  Bathing: independent  Upper Body Dressing: independent  Lower Body Dressing: independent  Toileting: independent  Home Management Skills: independent  Homemaking Responsibilities: Yes  Shopping Responsibility: Primary  Driving License: Yes  Occupation: On disability     Dominant hand: right    Subjective:  Communicated with nurse buckley prior to session.    Chief Complaint: back pain  Patient/Family stated goals: to get disability    Pain/Comfort  Pain Rating 1: 8/10  Location 1: back  Pain Rating Post-Intervention 1: 8/10    Objective:  Patient found with: telemetry, peripheral IV    Cognitive Exam:  Oriented to: Person, Place, Time and Situation  Follows Commands/attention: Follows two-step commands and Follows multistep  commands  Communication: clear/fluent  Memory:  No Deficits noted  Safety awareness/insight to disability: intact  Coping skills/emotional control: Appropriate to situation    Visual/perceptual:  Intact    Physical Exam:  Postural examination/scapula alignment: No postural abnormalities identified  Skin integrity: Visible skin  intact  Edema: None noted     Sensation:   Intact    Upper Extremity Range of Motion:  Right Upper Extremity: WFL  Left Upper Extremity: WFL    Upper Extremity Strength:  Right Upper Extremity: WFL  Left Upper Extremity: WFL   Strength: good    Fine motor coordination:   Intact    Gross motor coordination: WFL    Functional Mobility:  Bed Mobility:  Rolling/Turning to Left: Supervision  Rolling/Turning Right: Supervision  Scooting/Bridging: Supervision  Supine to Sit: Supervision  Sit to Supine: Supervision    Transfers:  Sit <> Stand Assistance: Stand By Assistance  Sit <> Stand Assistive Device:  (IV pole)  Toilet Transfer Technique: Stand Pivot  Toilet Transfer Assistance: Modified Independent  Toilet Transfer Assistive Device: No Assistive Device    Functional Ambulation: Pt ambulate to bathroom without any problems with supervision    Activities of Daily Living:  Feeding Level of Assistance: Modified independent  LE Dressing Level of Assistance: Modified independent  Grooming Position: Standing, Seated at sink  Grooming Level of Assistance: Modified independent  Toileting Where Assessed: Toilet  Toileting Level of Assistance: Modified independent   Balance:   Static Sit: GOOD+: Takes MAXIMAL challenges from all directions.    Dynamic Sit: GOOD+: Maintains balance through MAXIMAL excursions of active trunk motion  Static Stand: GOOD+: Takes MAXIMAL challenges from all directions  Dynamic stand: GOOD+: Independent gait (with or without assistive device)      AM-PAC 6 CLICK ADL  How much help from another person does this patient currently need?  1 = Unable, Total/Dependent Assistance  2 = A lot, Maximum/Moderate Assistance  3 = A little, Minimum/Contact Guard/Supervision  4 = None, Modified Phillips/Independent    Putting on and taking off regular lower body clothing? : 4  Bathing (including washing, rinsing, drying)?: 3  Toileting, which includes using toilet, bedpan, or urinal? : 4  Putting on and  "taking off regular upper body clothing?: 4  Taking care of personal grooming such as brushing teeth?: 4  Eating meals?: 4  Total Score: 23    AM-PAC Raw Score CMS "G-Code Modifier Level of Impairment Assistance   6 % Total / Unable   7 - 9 CM 80 - 100% Maximal Assist   10-14 CL 60 - 80% Moderate Assist   15 - 19 CK 40 - 60% Moderate Assist   20 - 22 CJ 20 - 40% Minimal Assist   23 CI 1-20% SBA / CGA   24 CH 0% Independent/ Mod I       Patient left supine with all lines intact and call button in reach    Assessment:  Kylie Aleman is a 57 y.o. female with a medical diagnosis of Panniculitis and presents with good functional mobility. There are no needs for OT at this time.    Rehab identified problem list/impairments: Rehab identified problem list/impairments:  (none)    Rehab potential is excellent.    Activity tolerance: Good    Discharge recommendations: Discharge Facility/Level Of Care Needs: home     Barriers to discharge:  none    Equipment recommendations: none     GOALS:    Occupational Therapy Goals     Not on file          Multidisciplinary Problems (Resolved)        Problem: Occupational Therapy Goal    Goal Priority Disciplines Outcome Interventions   Occupational Therapy Goal   (Resolved)     OT, PT/OT Outcome(s) achieved                    PLAN:  OK to discharge patient to home from an OT stand point.  Plan of Care reviewed with: patient         Sandra Engle OT  08/11/2017  "

## 2017-08-11 NOTE — NURSING
Bedside Report given to BIANCA High. Walking rounds completed. Visualized and assessed patient NAD noted. Safety precautions maintained and call light within reach.    Chart check completed.

## 2017-08-11 NOTE — ASSESSMENT & PLAN NOTE
patient has extensive panniculitis with candida intertrigo,patient has been started on broad spectrum IV Abx for failed out patient treatment for extensive panniculitis in lower abdomen.wound care has been consulted,she is not septic.improving.

## 2017-08-11 NOTE — PLAN OF CARE
Problem: Occupational Therapy Goal  Goal: Occupational Therapy Goal  Outcome: Outcome(s) achieved Date Met: 08/11/17  OT completed evaluation with no needs at this time.

## 2017-08-11 NOTE — PLAN OF CARE
Problem: Fall Risk (Adult)  Goal: Absence of Falls  Patient will demonstrate the desired outcomes by discharge/transition of care.   Outcome: Ongoing (interventions implemented as appropriate)   08/11/17 1218   Fall Risk (Adult)   Absence of Falls making progress toward outcome       Problem: Patient Care Overview  Goal: Plan of Care Review  Outcome: Ongoing (interventions implemented as appropriate)   08/11/17 1218   Coping/Psychosocial   Plan Of Care Reviewed With patient       Problem: Skin and Soft Tissue Infection (Adult)  Goal: Signs and Symptoms of Listed Potential Problems Will be Absent, Minimized or Managed (Skin and Soft Tissue Infection)  Signs and symptoms of listed potential problems will be absent, minimized or managed by discharge/transition of care (reference Skin and Soft Tissue Infection (Adult) CPG).   Outcome: Ongoing (interventions implemented as appropriate)   08/11/17 1218   Skin and Soft Tissue Infection   Problems Assessed (Skin and Soft Tissue Infection) all   Problems Present (Skin and Soft Tissue Infection) infection progression;situational response

## 2017-08-11 NOTE — PROGRESS NOTES
Receiving local wound care to skin folds abdomen and groin with Coloplast CriticAid Antifungal. Areas improved with less erythema and patient reports less pain. Continue local wound care to areas bid with CriticAid AF (Miconazole).

## 2017-08-12 VITALS
HEIGHT: 62 IN | RESPIRATION RATE: 18 BRPM | HEART RATE: 102 BPM | WEIGHT: 168.69 LBS | TEMPERATURE: 99 F | OXYGEN SATURATION: 100 % | SYSTOLIC BLOOD PRESSURE: 102 MMHG | BODY MASS INDEX: 31.04 KG/M2 | DIASTOLIC BLOOD PRESSURE: 60 MMHG

## 2017-08-12 LAB
ALBUMIN SERPL BCP-MCNC: 2.5 G/DL
ALP SERPL-CCNC: 211 U/L
ALT SERPL W/O P-5'-P-CCNC: 7 U/L
ANION GAP SERPL CALC-SCNC: 10 MMOL/L
AST SERPL-CCNC: 49 U/L
BASOPHILS # BLD AUTO: 0.04 K/UL
BASOPHILS NFR BLD: 0.7 %
BILIRUB SERPL-MCNC: 1.3 MG/DL
BUN SERPL-MCNC: 5 MG/DL
CALCIUM SERPL-MCNC: 8.3 MG/DL
CHLORIDE SERPL-SCNC: 103 MMOL/L
CO2 SERPL-SCNC: 24 MMOL/L
CREAT SERPL-MCNC: 1 MG/DL
DIFFERENTIAL METHOD: ABNORMAL
EOSINOPHIL # BLD AUTO: 0.1 K/UL
EOSINOPHIL NFR BLD: 1.4 %
ERYTHROCYTE [DISTWIDTH] IN BLOOD BY AUTOMATED COUNT: 16.4 %
EST. GFR  (AFRICAN AMERICAN): >60 ML/MIN/1.73 M^2
EST. GFR  (NON AFRICAN AMERICAN): >60 ML/MIN/1.73 M^2
GLUCOSE SERPL-MCNC: 89 MG/DL
HCT VFR BLD AUTO: 22.4 %
HGB BLD-MCNC: 7.9 G/DL
LYMPHOCYTES # BLD AUTO: 2.4 K/UL
LYMPHOCYTES NFR BLD: 40.9 %
MCH RBC QN AUTO: 38.9 PG
MCHC RBC AUTO-ENTMCNC: 35.3 G/DL
MCV RBC AUTO: 110 FL
MONOCYTES # BLD AUTO: 0.6 K/UL
MONOCYTES NFR BLD: 10 %
NEUTROPHILS # BLD AUTO: 2.8 K/UL
NEUTROPHILS NFR BLD: 47 %
PLATELET # BLD AUTO: 155 K/UL
PMV BLD AUTO: 9.3 FL
POTASSIUM SERPL-SCNC: 3.1 MMOL/L
PROT SERPL-MCNC: 6 G/DL
RBC # BLD AUTO: 2.03 M/UL
SODIUM SERPL-SCNC: 137 MMOL/L
WBC # BLD AUTO: 5.92 K/UL

## 2017-08-12 PROCEDURE — 25000003 PHARM REV CODE 250: Performed by: HOSPITALIST

## 2017-08-12 PROCEDURE — 63600175 PHARM REV CODE 636 W HCPCS: Performed by: PHYSICIAN ASSISTANT

## 2017-08-12 PROCEDURE — 25000003 PHARM REV CODE 250: Performed by: PHYSICIAN ASSISTANT

## 2017-08-12 PROCEDURE — S0028 INJECTION, FAMOTIDINE, 20 MG: HCPCS | Performed by: PHYSICIAN ASSISTANT

## 2017-08-12 PROCEDURE — 85025 COMPLETE CBC W/AUTO DIFF WBC: CPT

## 2017-08-12 PROCEDURE — 97161 PT EVAL LOW COMPLEX 20 MIN: CPT

## 2017-08-12 PROCEDURE — 80053 COMPREHEN METABOLIC PANEL: CPT

## 2017-08-12 PROCEDURE — 63600175 PHARM REV CODE 636 W HCPCS: Performed by: HOSPITALIST

## 2017-08-12 PROCEDURE — 36415 COLL VENOUS BLD VENIPUNCTURE: CPT

## 2017-08-12 RX ORDER — DOXYCYCLINE HYCLATE 100 MG
100 TABLET ORAL EVERY 12 HOURS
Qty: 14 TABLET | Refills: 0 | Status: SHIPPED | OUTPATIENT
Start: 2017-08-12 | End: 2017-08-19

## 2017-08-12 RX ADMIN — POLYETHYLENE GLYCOL 3350 17 G: 17 POWDER, FOR SOLUTION ORAL at 09:08

## 2017-08-12 RX ADMIN — POTASSIUM BICARBONATE 50 MEQ: 25 TABLET, EFFERVESCENT ORAL at 10:08

## 2017-08-12 RX ADMIN — ESCITALOPRAM OXALATE 20 MG: 10 TABLET ORAL at 09:08

## 2017-08-12 RX ADMIN — AMLODIPINE BESYLATE 10 MG: 5 TABLET ORAL at 09:08

## 2017-08-12 RX ADMIN — MICONAZOLE NITRATE: 20 OINTMENT TOPICAL at 09:08

## 2017-08-12 RX ADMIN — MORPHINE SULFATE 4 MG: 10 INJECTION INTRAVENOUS at 05:08

## 2017-08-12 RX ADMIN — HEPARIN SODIUM 5000 UNITS: 5000 INJECTION, SOLUTION INTRAVENOUS; SUBCUTANEOUS at 05:08

## 2017-08-12 RX ADMIN — PIPERACILLIN SODIUM AND TAZOBACTAM SODIUM 4.5 G: 4; .5 INJECTION, POWDER, LYOPHILIZED, FOR SOLUTION INTRAVENOUS at 05:08

## 2017-08-12 RX ADMIN — FAMOTIDINE 20 MG: 10 INJECTION, SOLUTION INTRAVENOUS at 09:08

## 2017-08-12 NOTE — PLAN OF CARE
08/12/17 1145   Final Note   Assessment Type Final Discharge Note   Discharge Disposition Home   Discharge planning education complete? Yes   What phone number can be called within the next 1-3 days to see how you are doing after discharge? ([phone] )   Hospital Follow Up  Appt(s) scheduled? Yes   Discharge plans and expectations educations in teach back method with documentation complete? Yes   Offered [facility] -- Bedside Delivery? n/a   Discharge/Hospital Encounter Summary to ([facility]) PCP n/a   Referral to Outpatient Case Management complete? n/a   Referral to / orders for Home Health Complete? n/a   30 day supply of medicines given at discharge, if documented non-compliance / non-adherence? n/a   Any social issues identified prior to discharge? No   Did you assess the readiness or willingness of the family or caregiver to support self management of care? Yes   Right Care Referral Info   Post Acute Recommendation No Care

## 2017-08-12 NOTE — NURSING
Discharge instructions and prescriptions given to and reviewed with patient. Patient verbalizes understanding on d/c instructions, daily wound care and prescriptions. Patient denies any needs at this time. Patient calling for ride home. Will continue to monitor

## 2017-08-12 NOTE — PLAN OF CARE
Problem: Patient Care Overview  Goal: Plan of Care Review  Outcome: Ongoing (interventions implemented as appropriate)   08/12/17 8154   Coping/Psychosocial   Plan Of Care Reviewed With patient     Reviewed care plan with pt; provided wound care and explained importance of keeping site clean and dried completely. Reviewed IV abx with pt.

## 2017-08-12 NOTE — DISCHARGE SUMMARY
[facility] Hospital Medicine  Discharge Summary      Patient Name: Kylie Aleman  MRN: [id number]  Admission Date: 8/9/2017  Hospital Length of Stay: 3 days  Discharge Date and Time:  08/12/2017 12:30 PM  Attending Physician: Ranjana Elise MD   Discharging Provider: Ranjana Elise MD  Primary Care Provider: Shaggy Cook RT      HPI:   This 57 y.o. female with medical history of depression and hypertension presents to the ED c/o a wound infection to the abdomen for the past 1x week. Pt reports that she was recently evaluated at an Urgent Care facility for the symptoms on Friday (8/4/17) and notes that she was prescribed antibiotics (bactrim) as well as nystatin for treatment without any relief as the symptoms are worsening. She adds that she is also experiencing cramping pain to the back. Symptoms are acute, constant and mild (2/10). Pt notes that she has never experienced these symptoms before. Pt denies dysuria and history of diabetes mellitus. No other associated symptoms.on Exam,patient has extensive panniculitis with candida intertrigo,patient has been started on broad spectrum IV Abx for failed out patient treatment for extensive panniculitis in lower abdomen.    * No surgery found *      Indwelling Lines/Drains at time of discharge:   Lines/Drains/Airways          No matching active lines, drains, or airways        Hospital Course:   This 57 y.o. female with medical history of depression and hypertension presents to the ED c/o a wound infection to the abdomen for the past 1x week. Pt reports that she was recently evaluated at an Urgent Care facility for the symptoms on Friday (8/4/17) and notes that she was prescribed antibiotics (bactrim) as well as nystatin for treatment without any relief as the symptoms are worsening. She adds that she is also experiencing cramping pain to the back. Symptoms are acute, constant and mild (2/10). Pt notes that she has never  experienced these symptoms before. Pt denies dysuria and history of diabetes mellitus. No other associated symptoms.on Exam,patient had extensive panniculitis with candida intertrigo,patient has been started on broad spectrum IV Abx for failed out patient treatment for extensive panniculitis in lower abdomen,also antifungal creams and wound care was following,her panniculitis much improved,she has been discharged home with po Abx,miconazole creams and follow up with PCP in next few days.     Consults:     Significant Diagnostic Studies: Labs:   BMP:   Recent Labs  Lab 08/11/17 0518 08/12/17 0427    89    137   K 3.0* 3.1*    103   CO2 25 24   BUN 5* 5*   CREATININE 0.9 1.0   CALCIUM 8.4* 8.3*    and CBC   Recent Labs  Lab 08/11/17 0518 08/12/17 0427   WBC 5.96 5.92   HGB 7.9* 7.9*   HCT 22.0* 22.4*    155       Pending Diagnostic Studies:     None        Final Active Diagnoses:    Diagnosis Date Noted POA    PRINCIPAL PROBLEM:  Panniculitis [M79.3] 08/09/2017 Yes    Essential hypertension [I10] 08/10/2017 Yes    Depression [F32.9] 08/10/2017 Yes      Problems Resolved During this Admission:    Diagnosis Date Noted Date Resolved POA      No new Assessment & Plan notes have been filed under this hospital service since the last note was generated.  Service: Hospital Medicine      Discharged Condition: stable    Disposition: Home or Self Care    Follow Up:  Follow-up Information     Shaggy Cook, RT In 1 week.    Specialty:  Radiology               Patient Instructions:     Diet general     Activity as tolerated       Medications:  Reconciled Home Medications:   Current Discharge Medication List      START taking these medications    Details   doxycycline (VIBRA-TABS) 100 MG tablet Take 1 tablet (100 mg total) by mouth every 12 (twelve) hours.  Qty: 14 tablet, Refills: 0      miconazole nitrate 2% (MICOTIN) 2 % Oint Apply topically 2 (two) times daily.  Qty: 1 Tube, Refills: 0          CONTINUE these medications which have NOT CHANGED    Details   amlodipine (NORVASC) 10 MG tablet Take 10 mg by mouth once daily.      escitalopram oxalate (LEXAPRO) 20 MG tablet Take 20 mg by mouth once daily.           Time spent on the discharge of patient: 30  minutes    HOS POC IP DISCHARGE SUMMARY    Ranjana Elise MD  Department of Hospital Medicine  [facility]

## 2017-08-12 NOTE — PT/OT/SLP EVAL
Physical Therapy  Evaluation    Kylie Aleman   MRN: [id number]   Admitting Diagnosis: Panniculitis    PT Received On: 08/12/17  PT Start Time: 1453     PT Stop Time: 1506    PT Total Time (min): 13 min       Billable Minutes:  Evaluation 13    Diagnosis: Panniculitis      Past Medical History:   Diagnosis Date    Depression     Hypertension       Past Surgical History:   Procedure Laterality Date    HYSTERECTOMY      TOTAL HIP ARTHROPLASTY         Referring physician: unknown  Date referred to PT: 08/11/17    General Precautions: Standard, fall  Orthopedic Precautions: N/A   Braces: N/A            Patient History:  Lives With: child(loren), adult, spouse  Living Arrangements: house  Equipment Currently Used at Home: rollator, walker, rolling, bedside commode, shower chair  DME owned (not currently used): rolling walker, bedside commode and rollator    Previous Level of Function:  Ambulation Skills: needs device  Transfer Skills: needs device    Subjective:  Communicated with nurse prior to session.    Chief Complaint: none  Patient goals: To be able to rest.    Pain/Comfort  Pain Rating 1: 0/10           Cognitive Exam:  Oriented to: Person, Place, Time and Situation    Follows Commands/attention: Follows multistep  commands  Communication: clear/fluent  Safety awareness/insight to disability: intact    Physical Exam:      Skin integrity: Visible skin intact  Edema: None noted     Sensation:   Intact      Lower Extremity Range of Motion:  Right Lower Extremity: WFL  Left Lower Extremity: WFL    Lower Extremity Strength:  Right Lower Extremity: WFL  Left Lower Extremity: WFL     Fine motor coordination:  NT    Gross motor coordination: WFL    Functional Mobility:  Bed Mobility:  Supine to Sit: Stand by Assistance  Sit to Supine: Stand by Assistance    Transfers:  Sit <> Stand Assistance: Stand By Assistance  Sit <> Stand Assistive Device: Rolling Walker    Gait:   Gait Distance: 30  Assistance 1: Stand by  Assistance  Gait Assistive Device: Rolling walker    Stairs:  NT    Balance:   Static Sit: good  Dynamic Sit: NT  Static Stand: good  Dynamic stand: fair+          AM-PAC 6 CLICK MOBILITY  How much help from another person does this patient currently need?   1 = Unable, Total/Dependent Assistance  2 = A lot, Maximum/Moderate Assistance  3 = A little, Minimum/Contact Guard/Supervision  4 = None, Modified [place]/Independent    Turning over in bed (including adjusting bedclothes, sheets and blankets)?: 4  Sitting down on and standing up from a chair with arms (e.g., wheelchair, bedside commode, etc.): 4  Moving from lying on back to sitting on the side of the bed?: 4  Moving to and from a bed to a chair (including a wheelchair)?: 4  Need to walk in hospital room?: 4  Climbing 3-5 steps with a railing?: 4  Total Score: 24     AM-PAC Raw Score CMS G-Code Modifier Level of Impairment Assistance   6 % Total / Unable   7 - 9 CM 80 - 100% Maximal Assist   10 - 14 CL 60 - 80% Moderate Assist   15 - 19 CK 40 - 60% Moderate Assist   20 - 22 CJ 20 - 40% Minimal Assist   23 CI 1-20% SBA / CGA   24 CH 0% Independent/ Mod I     Patient left supine with call button in reach and son present.    Assessment:   Kylie Aleman is a 57 y.o. female with a medical diagnosis of Panniculitis and presents with functional strength and mobility.    Rehab identified problem list/impairments:      Rehab potential is good.    Activity tolerance: Good    Discharge recommendations: Discharge Facility/Level Of Care Needs: home     Barriers to discharge: Barriers to Discharge: None    Equipment recommendations: Equipment Needed After Discharge: none     GOALS:    Physical Therapy Goals     Not on file                PLAN:    Patient to be seen   to address the above listed problems via    Plan of Care expires: 08/12/17  Plan of Care reviewed with: patient          Terinne G Coleman, PT  08/12/2017

## 2017-08-16 ENCOUNTER — HOSPITAL ENCOUNTER (OUTPATIENT)
Facility: HOSPITAL | Age: 57
Discharge: HOME | End: 2017-08-17
Attending: EMERGENCY MEDICINE | Admitting: HOSPITALIST
Payer: MEDICAID

## 2017-08-16 DIAGNOSIS — I10 ESSENTIAL HYPERTENSION: ICD-10-CM

## 2017-08-16 DIAGNOSIS — R10.9 ABDOMINAL PAIN: ICD-10-CM

## 2017-08-16 DIAGNOSIS — K85.90 ACUTE PANCREATITIS, UNSPECIFIED COMPLICATION STATUS, UNSPECIFIED PANCREATITIS TYPE: Primary | ICD-10-CM

## 2017-08-16 DIAGNOSIS — I11.9 HYPERTENSIVE HEART DISEASE: ICD-10-CM

## 2017-08-16 DIAGNOSIS — I48.91 NEW ONSET ATRIAL FIBRILLATION: ICD-10-CM

## 2017-08-16 DIAGNOSIS — I49.9 CARDIAC ARRHYTHMIA, UNSPECIFIED CARDIAC ARRHYTHMIA TYPE: ICD-10-CM

## 2017-08-16 DIAGNOSIS — M79.3 PANNICULITIS: ICD-10-CM

## 2017-08-16 PROBLEM — K85.30 DRUG-INDUCED ACUTE PANCREATITIS: Status: ACTIVE | Noted: 2017-08-16

## 2017-08-16 PROBLEM — E87.29 INCREASED ANION GAP METABOLIC ACIDOSIS: Status: ACTIVE | Noted: 2017-08-16

## 2017-08-16 LAB
ALBUMIN SERPL BCP-MCNC: 2.9 G/DL
ALP SERPL-CCNC: 232 U/L
ALT SERPL W/O P-5'-P-CCNC: 6 U/L
ANION GAP SERPL CALC-SCNC: 17 MMOL/L
ANION GAP SERPL CALC-SCNC: 19 MMOL/L
ANISOCYTOSIS BLD QL SMEAR: ABNORMAL
AST SERPL-CCNC: 46 U/L
BASOPHILS # BLD AUTO: 0.02 K/UL
BASOPHILS NFR BLD: 0.2 %
BILIRUB SERPL-MCNC: 1.2 MG/DL
BUN SERPL-MCNC: 10 MG/DL
BUN SERPL-MCNC: 11 MG/DL
CALCIUM SERPL-MCNC: 7.8 MG/DL
CALCIUM SERPL-MCNC: 7.9 MG/DL
CHLORIDE SERPL-SCNC: 102 MMOL/L
CHLORIDE SERPL-SCNC: 103 MMOL/L
CO2 SERPL-SCNC: 18 MMOL/L
CO2 SERPL-SCNC: 20 MMOL/L
CREAT SERPL-MCNC: 1.1 MG/DL
CREAT SERPL-MCNC: 1.1 MG/DL
DIFFERENTIAL METHOD: ABNORMAL
EOSINOPHIL # BLD AUTO: 0.1 K/UL
EOSINOPHIL NFR BLD: 0.8 %
ERYTHROCYTE [DISTWIDTH] IN BLOOD BY AUTOMATED COUNT: 17.9 %
EST. GFR  (AFRICAN AMERICAN): >60 ML/MIN/1.73 M^2
EST. GFR  (AFRICAN AMERICAN): >60 ML/MIN/1.73 M^2
EST. GFR  (NON AFRICAN AMERICAN): 55 ML/MIN/1.73 M^2
EST. GFR  (NON AFRICAN AMERICAN): 55 ML/MIN/1.73 M^2
GLUCOSE SERPL-MCNC: 100 MG/DL
GLUCOSE SERPL-MCNC: 86 MG/DL
HCT VFR BLD AUTO: 29.1 %
HGB BLD-MCNC: 10 G/DL
HIV1+2 IGG SERPL QL IA.RAPID: NEGATIVE
HYPOCHROMIA BLD QL SMEAR: ABNORMAL
LIPASE SERPL-CCNC: 115 U/L
LIPASE SERPL-CCNC: 82 U/L
LYMPHOCYTES # BLD AUTO: 3.4 K/UL
LYMPHOCYTES NFR BLD: 33.5 %
MCH RBC QN AUTO: 38.2 PG
MCHC RBC AUTO-ENTMCNC: 34.4 G/DL
MCV RBC AUTO: 111 FL
MONOCYTES # BLD AUTO: 2.3 K/UL
MONOCYTES NFR BLD: 22.6 %
NEUTROPHILS # BLD AUTO: 4.3 K/UL
NEUTROPHILS NFR BLD: 43.4 %
PLATELET # BLD AUTO: 136 K/UL
PMV BLD AUTO: 11.5 FL
POLYCHROMASIA BLD QL SMEAR: ABNORMAL
POTASSIUM SERPL-SCNC: 3.3 MMOL/L
POTASSIUM SERPL-SCNC: 4 MMOL/L
PROT SERPL-MCNC: 6.9 G/DL
RBC # BLD AUTO: 2.62 M/UL
SODIUM SERPL-SCNC: 139 MMOL/L
SODIUM SERPL-SCNC: 140 MMOL/L
TARGETS BLD QL SMEAR: ABNORMAL
WBC # BLD AUTO: 10.07 K/UL

## 2017-08-16 PROCEDURE — 83690 ASSAY OF LIPASE: CPT

## 2017-08-16 PROCEDURE — 80048 BASIC METABOLIC PNL TOTAL CA: CPT

## 2017-08-16 PROCEDURE — 25000003 PHARM REV CODE 250: Performed by: EMERGENCY MEDICINE

## 2017-08-16 PROCEDURE — 86703 HIV-1/HIV-2 1 RESULT ANTBDY: CPT

## 2017-08-16 PROCEDURE — 80074 ACUTE HEPATITIS PANEL: CPT

## 2017-08-16 PROCEDURE — 63600175 PHARM REV CODE 636 W HCPCS: Performed by: EMERGENCY MEDICINE

## 2017-08-16 PROCEDURE — G0378 HOSPITAL OBSERVATION PER HR: HCPCS

## 2017-08-16 PROCEDURE — 96375 TX/PRO/DX INJ NEW DRUG ADDON: CPT

## 2017-08-16 PROCEDURE — 85025 COMPLETE CBC W/AUTO DIFF WBC: CPT

## 2017-08-16 PROCEDURE — 25000003 PHARM REV CODE 250: Performed by: NURSE PRACTITIONER

## 2017-08-16 PROCEDURE — 63600175 PHARM REV CODE 636 W HCPCS: Performed by: NURSE PRACTITIONER

## 2017-08-16 PROCEDURE — 99284 EMERGENCY DEPT VISIT MOD MDM: CPT | Mod: 25

## 2017-08-16 PROCEDURE — 80053 COMPREHEN METABOLIC PANEL: CPT

## 2017-08-16 PROCEDURE — 96361 HYDRATE IV INFUSION ADD-ON: CPT

## 2017-08-16 PROCEDURE — 83690 ASSAY OF LIPASE: CPT | Mod: 91

## 2017-08-16 PROCEDURE — 96374 THER/PROPH/DIAG INJ IV PUSH: CPT

## 2017-08-16 PROCEDURE — 36415 COLL VENOUS BLD VENIPUNCTURE: CPT

## 2017-08-16 RX ORDER — MORPHINE SULFATE 10 MG/ML
2 INJECTION INTRAMUSCULAR; INTRAVENOUS; SUBCUTANEOUS
Status: COMPLETED | OUTPATIENT
Start: 2017-08-16 | End: 2017-08-16

## 2017-08-16 RX ORDER — HYDROMORPHONE HYDROCHLORIDE 2 MG/ML
0.5 INJECTION, SOLUTION INTRAMUSCULAR; INTRAVENOUS; SUBCUTANEOUS EVERY 6 HOURS PRN
Status: DISPENSED | OUTPATIENT
Start: 2017-08-16 | End: 2017-08-17

## 2017-08-16 RX ORDER — HYDROMORPHONE HYDROCHLORIDE 2 MG/ML
0.5 INJECTION, SOLUTION INTRAMUSCULAR; INTRAVENOUS; SUBCUTANEOUS EVERY 6 HOURS PRN
Status: DISCONTINUED | OUTPATIENT
Start: 2017-08-16 | End: 2017-08-16

## 2017-08-16 RX ORDER — DIAZEPAM 10 MG/2ML
2 INJECTION INTRAMUSCULAR ONCE
Status: COMPLETED | OUTPATIENT
Start: 2017-08-16 | End: 2017-08-16

## 2017-08-16 RX ORDER — AMOXICILLIN 250 MG
1 CAPSULE ORAL 2 TIMES DAILY PRN
Status: DISCONTINUED | OUTPATIENT
Start: 2017-08-16 | End: 2017-08-17 | Stop reason: HOSPADM

## 2017-08-16 RX ORDER — ACETAMINOPHEN 325 MG/1
650 TABLET ORAL EVERY 6 HOURS PRN
Status: DISCONTINUED | OUTPATIENT
Start: 2017-08-16 | End: 2017-08-17 | Stop reason: HOSPADM

## 2017-08-16 RX ORDER — PANTOPRAZOLE SODIUM 40 MG/1
40 TABLET, DELAYED RELEASE ORAL DAILY
Status: DISCONTINUED | OUTPATIENT
Start: 2017-08-17 | End: 2017-08-17 | Stop reason: HOSPADM

## 2017-08-16 RX ORDER — SODIUM CHLORIDE 9 MG/ML
INJECTION, SOLUTION INTRAVENOUS CONTINUOUS
Status: DISCONTINUED | OUTPATIENT
Start: 2017-08-16 | End: 2017-08-16

## 2017-08-16 RX ORDER — SODIUM CHLORIDE 9 MG/ML
INJECTION, SOLUTION INTRAVENOUS CONTINUOUS
Status: DISCONTINUED | OUTPATIENT
Start: 2017-08-16 | End: 2017-08-17 | Stop reason: HOSPADM

## 2017-08-16 RX ORDER — DIAZEPAM 10 MG/2ML
2 INJECTION INTRAMUSCULAR EVERY 8 HOURS
Status: DISCONTINUED | OUTPATIENT
Start: 2017-08-16 | End: 2017-08-17 | Stop reason: HOSPADM

## 2017-08-16 RX ORDER — ENOXAPARIN SODIUM 100 MG/ML
40 INJECTION SUBCUTANEOUS EVERY 24 HOURS
Status: DISCONTINUED | OUTPATIENT
Start: 2017-08-16 | End: 2017-08-17 | Stop reason: HOSPADM

## 2017-08-16 RX ORDER — OXYCODONE AND ACETAMINOPHEN 5; 325 MG/1; MG/1
1 TABLET ORAL EVERY 4 HOURS PRN
Status: DISCONTINUED | OUTPATIENT
Start: 2017-08-17 | End: 2017-08-17 | Stop reason: HOSPADM

## 2017-08-16 RX ADMIN — SODIUM CHLORIDE 1000 ML: 0.9 INJECTION, SOLUTION INTRAVENOUS at 05:08

## 2017-08-16 RX ADMIN — DIAZEPAM 2 MG: 5 INJECTION, SOLUTION INTRAMUSCULAR; INTRAVENOUS at 09:08

## 2017-08-16 RX ADMIN — DOCUSATE SODIUM AND SENNOSIDES 1 TABLET: 8.6; 5 TABLET, FILM COATED ORAL at 09:08

## 2017-08-16 RX ADMIN — MORPHINE SULFATE 2 MG: 10 INJECTION INTRAVENOUS at 04:08

## 2017-08-16 RX ADMIN — SODIUM CHLORIDE 1000 ML: 0.9 INJECTION, SOLUTION INTRAVENOUS at 12:08

## 2017-08-16 RX ADMIN — ENOXAPARIN SODIUM 40 MG: 100 INJECTION SUBCUTANEOUS at 06:08

## 2017-08-16 RX ADMIN — DIAZEPAM 2 MG: 5 INJECTION, SOLUTION INTRAMUSCULAR; INTRAVENOUS at 06:08

## 2017-08-16 RX ADMIN — SODIUM CHLORIDE: 0.9 INJECTION, SOLUTION INTRAVENOUS at 06:08

## 2017-08-16 NOTE — ED TRIAGE NOTES
"Pt. States that she hasn't had a bowel movement in 3 days and thinks she is constipated. Pt. States took "2 gas-X'  And hand no relief  "

## 2017-08-16 NOTE — HPI
Kylie Alemna is a 57 y.o. female with past medical history of Depression, Chronic lumbar back pain, and Hypertension. She presented for evaluation of a 2-3 day history of epigastric abdominal aching type pain 8/10 with associated constipation, abdominal distention, nausea without vomiting, and difficulty passing gas. Per patient it felt like she needed to have a BM but couldn't. Patient recently discharged from hospital 1 week ago after treatment for panniculitis with IV ABX. She denies CP, headache, constipation, diarrhea or CVA tenderness. Patient endorses drinking about 2 drinks of rum daily. Her family, who are at the bedside, beckoned me outside of the room, after interview and reported that patient drinks significantly more alcohol daily than she reported. They voiced concern regarding her dependence.     At the time of presentation patient was afebrile without leukocytosis. Abd XR significant for non-obstructive bowel gas pattern. Labs significant for AG=19, CO2=18, elevated lipase= 115, and AST 46, ALT 6

## 2017-08-16 NOTE — H&P
[facility] Hospital Medicine  History & Physical    Patient Name: Kylie Aleman  MRN: [id number]  Admission Date: 8/16/2017  Attending Physician: Leanne Baca MD   Primary Care Provider: Lulu Mcnamara MD         Patient information was obtained from patient and ER records.     Subjective:     Principal Problem:Drug-induced acute pancreatitis    Chief Complaint:   Chief Complaint   Patient presents with    Wound Infection     recently discharged from hospital... states has an infection of the skin (abdominal area). complaining of gas (took gas-x with no relief)        HPI: Kylie Aleman is a 57 y.o. female with past medical history of Depression, Chronic lumbar back pain, and Hypertension. She presented for evaluation of a 2-3 day history of epigastric abdominal aching type pain 8/10 with associated constipation, abdominal distention, nausea without vomiting, and difficulty passing gas. Per patient it felt like she needed to have a BM but couldn't. Patient recently discharged from hospital 1 week ago after treatment for panniculitis with IV ABX. She denies CP, headache, constipation, diarrhea or CVA tenderness. Patient endorses drinking about 2 drinks of rum daily. Her family, who are at the bedside, beckoned me outside of the room, after interview and reported that patient drinks significantly more alcohol daily than she reported. They voiced concern regarding her dependence.     At the time of presentation patient was afebrile without leukocytosis. Abd XR significant for non-obstructive bowel gas pattern. Labs significant for AG=19, CO2=18, elevated lipase= 115, and AST 46, ALT 6       Past Medical History:   Diagnosis Date    Depression     Hypertension        Past Surgical History:   Procedure Laterality Date    HYSTERECTOMY      TOTAL HIP ARTHROPLASTY         Review of patient's allergies indicates:  No Known Allergies    No current facility-administered medications on file  prior to encounter.      Current Outpatient Prescriptions on File Prior to Encounter   Medication Sig    amlodipine (NORVASC) 10 MG tablet Take 10 mg by mouth once daily.    doxycycline (VIBRA-TABS) 100 MG tablet Take 1 tablet (100 mg total) by mouth every 12 (twelve) hours.    escitalopram oxalate (LEXAPRO) 20 MG tablet Take 20 mg by mouth once daily.    miconazole nitrate 2% (MICOTIN) 2 % Oint Apply topically 2 (two) times daily.     Family History     None        Social History Main Topics    Smoking status: Former Smoker    Smokeless tobacco: Never Used    Alcohol use Yes      Comment: QOD    Drug use: No    Sexual activity: No     Review of Systems   Constitutional: Negative for appetite change, chills, diaphoresis and fever.   HENT: Negative for congestion, hearing loss, sore throat, tinnitus and trouble swallowing.    Eyes: Negative for photophobia, discharge, itching and visual disturbance.   Respiratory: Negative for apnea, cough, wheezing and stridor.    Cardiovascular: Negative for chest pain, palpitations and leg swelling.   Gastrointestinal: Positive for abdominal pain. Negative for abdominal distention, blood in stool, constipation, diarrhea and nausea.   Endocrine: Negative for polydipsia, polyphagia and polyuria.   Genitourinary: Negative for difficulty urinating, dysuria, flank pain and frequency.   Musculoskeletal: Negative for arthralgias, joint swelling and neck stiffness.   Skin: Negative for color change, rash and wound.   Neurological: Negative for dizziness, tremors, seizures, light-headedness, numbness and headaches.   Hematological: Negative for adenopathy.   Psychiatric/Behavioral: Negative for hallucinations and self-injury.     Objective:     Vital Signs (Most Recent):  Temp: 98.1 °F (36.7 °C) (08/16/17 1523)  Pulse: 89 (08/16/17 1523)  Resp: 16 (08/16/17 1523)  BP: (!) 149/92 (08/16/17 1523)  SpO2: 100 % (08/16/17 1523) Vital Signs (24h Range):  Temp:  [97.6 °F (36.4 °C)-98.1  °F (36.7 °C)] 98.1 °F (36.7 °C)  Pulse:  [] 89  Resp:  [16] 16  SpO2:  [96 %-100 %] 100 %  BP: (123-149)/(62-92) 149/92     Weight: 81.6 kg (180 lb)  Body mass index is 32.92 kg/m².    Physical Exam   Constitutional: She is oriented to person, place, and time. She appears well-developed and well-nourished. She is cooperative.   HENT:   Head: Normocephalic and atraumatic.   Eyes: Conjunctivae, EOM and lids are normal. Pupils are equal, round, and reactive to light. Scleral icterus is present.   Mild scleral icterus   Neck: Normal range of motion and full passive range of motion without pain. Neck supple. No JVD present. No edema present. No thyroid mass present.   Cardiovascular: S1 normal, S2 normal and intact distal pulses.    No murmur heard.  Abdominal: Soft. Bowel sounds are normal. She exhibits no distension and no abdominal bruit. There is no splenomegaly or hepatomegaly. There is tenderness. There is guarding. There is no CVA tenderness.   Musculoskeletal: Normal range of motion.   Lymphadenopathy:     She has no cervical adenopathy.     She has no axillary adenopathy.   Neurological: She is alert and oriented to person, place, and time. She has normal reflexes. She displays no tremor. She displays no seizure activity.   Skin: Skin is warm, dry and intact.   Psychiatric: She has a normal mood and affect. Her speech is normal. Thought content normal. Cognition and memory are normal.        Significant Labs:   CBC:   Recent Labs  Lab 08/16/17  1150   WBC 10.07   HGB 10.0*   HCT 29.1*   *     CMP:   Recent Labs  Lab 08/16/17  1520      K 4.0      CO2 18*      BUN 11   CREATININE 1.1   CALCIUM 7.8*   PROT 6.9   ALBUMIN 2.9*   BILITOT 1.2*   ALKPHOS 232*   AST 46*   ALT 6*   ANIONGAP 19*   EGFRNONAA 55*     Cardiac Markers: No results for input(s): CKMB, MYOGLOBIN, BNP, TROPISTAT in the last 48 hours.  Coagulation: No results for input(s): INR, APTT in the last 48 hours.    Invalid  input(s): PT  Lactic Acid: No results for input(s): LACTATE in the last 48 hours.  Lipase:   Recent Labs  Lab 08/16/17  1520   LIPASE 115*     Lipid Panel: No results for input(s): CHOL, HDL, LDLCALC, TRIG, CHOLHDL in the last 48 hours.  Troponin: No results for input(s): TROPONINI in the last 48 hours.  TSH: No results for input(s): TSH in the last 4320 hours.  Urine Culture: No results for input(s): LABURIN in the last 48 hours.    Significant Imaging:   Imaging Results          X-Ray Abdomen Flat And Erect (Final result)  Result time 08/16/17 12:26:29    Final result by Sobeida Baltazar MD (08/16/17 12:26:29)                 Impression:         Nonobstructive bowel gas pattern.      Electronically signed by: SOBEIDA BALTAZAR MD, MD  Date:     08/16/17  Time:    12:26              Narrative:    Comparison: None.    Findings: Upright and supine views of the abdomen.  Resolution is limited by body habitus with underpenetration. Nonspecific bowel gas pattern without evidence of bowel obstruction.  No small bowel air-fluid levels.  No free air.   No organomegaly or mass effect.   Partially imaged left hip total arthroplasty noted. Included osseous structures show degenerative change without acute process seen. Visualized lung bases are clear.                                Assessment/Plan:     * Drug-induced acute pancreatitis    NPO for now - attempt clear liquids in am - start advancing if tolerated tomorrow after breakfast  Lipase Q12H X 3  Supportive care with IV dilaudid until morning convert to oral percocet when able to tolerate meals  SW for rehab resources              Arrhythmia    Tachycardia, HR now at 112 suspect some degree of DTs  Give IV diazapam 2 mg now then Q8H X 3 doses  Reassess in am  Continuous cardiac monitoring          Increased anion gap metabolic acidosis    Likely 2/2 alcohol use and NV  IV fluids - supportive care for pain and nausea  BMP q12H          Essential hypertension    BP stable at  this time HR elevated suspect early DT as patients last drink was a little more than 24 hours ago  Hold home amlodipine until taking orals  PRN hydralazine Q12H SBP>170        Panniculitis    Treated last admit - afebrile without leukocytosis - monitor            VTE Risk Mitigation         Ordered     Medium Risk of VTE  Once      08/16/17 1744     enoxaparin injection 40 mg  Daily     Route:  Subcutaneous        08/16/17 1744               PAIGE Braxton, FNP-C  PA# [id number]  NPI# [id number]  Hospitalist - Department of Hospital Medicine  [address] [place] [address]  Office [phone]; Pager [phone]

## 2017-08-16 NOTE — ED PROVIDER NOTES
Encounter Date: 8/16/2017    SCRIBE #1 NOTE: IAicha, am scribing for, and in the presence of, Alex Calderon MD.       History     Chief Complaint   Patient presents with    Wound Infection     recently discharged from abdomen.. patient had an infection of the skin.. complaining of gas (took gas-x with no relief)     CC: Constipation    HPI: Patient is a 57 y.o. F with a past medical history of Depression; Chronic lumbar back pain; Hypertension who presents to the ED for evaluation of a 2-day history of constipation, abdominal distention, nausea, and difficulty passing gas. She also reports generalized weakness. Patient was discharged 8/12/17 from a 3-day admission for significant Candidal intertrigo infection after she failed outpatient therapy with a 1-week course of Bactrim. She was treated with IV antibiotics and discharged with oral antibiotics and Miconazole. She believes she was given narcotics while in the hospital. She has been treating constipation with Gas-X and Dulcolax (last dose yesterday) with little relief. Patient denies fever, dysuria, chest pain, and/or shortness of breath.      The history is provided by the patient. No  was used.     Review of patient's allergies indicates:  No Known Allergies  Past Medical History:   Diagnosis Date    Depression     Hypertension      Past Surgical History:   Procedure Laterality Date    HYSTERECTOMY      TOTAL HIP ARTHROPLASTY       History reviewed. No pertinent family history.  Social History   Substance Use Topics    Smoking status: Former Smoker    Smokeless tobacco: Never Used    Alcohol use Yes      Comment: QOD     Review of Systems   Constitutional: Negative for fever.   HENT: Negative for sore throat.    Eyes: Negative for pain.   Respiratory: Negative for shortness of breath.    Cardiovascular: Negative for chest pain.   Gastrointestinal: Positive for abdominal distention, constipation and nausea. Negative for  vomiting.        (+) Difficulty passing gas   Genitourinary: Negative for dysuria.   Musculoskeletal: Positive for back pain (low back pain; chronic).   Skin:        (+) Erythema to pannus of abdomen   Neurological: Positive for weakness (generalized).       Physical Exam     Initial Vitals [08/16/17 1033]   BP Pulse Resp Temp SpO2   123/62 108 16 97.8 °F (36.6 °C) 96 %      MAP       82.33         Physical Exam    Nursing note and vitals reviewed.  Constitutional: She appears well-developed and well-nourished.  Non-toxic appearance. She does not appear ill.   HENT:   Head: Normocephalic and atraumatic.   Eyes: EOM are normal.   Neck: Neck supple.   Cardiovascular: Normal rate and regular rhythm.   Pulmonary/Chest: Effort normal and breath sounds normal. No respiratory distress.   Abdominal: Soft. Normal appearance and bowel sounds are normal. She exhibits no distension. There is tenderness. There is no rebound and no guarding.   Mild, diffuse, abdominal tenderness.  No distention.      Musculoskeletal: Normal range of motion.   Bilateral lumbosacral tenderness.  No flank tenderness.   Neurological: She is alert.   Skin: Skin is warm and dry.   Hyperpigmentation and erythematous skin in pannus of abdomen consistent with Candidal intertrigo.  No vesicles, ulcers, or breakdown.   Psychiatric: She has a normal mood and affect.         ED Course   Procedures  Labs Reviewed   CBC W/ AUTO DIFFERENTIAL   COMPREHENSIVE METABOLIC PANEL   LIPASE             Medical Decision Making:   Appears to have acute pancreatitis of unknown etiology.  Could be secondary to an antibiotic she was just taking while the hospital.  Given her pain, age, new diagnosis, recent admission to feel she needs to be observed in the hospital IV fluids and further workup            Scribe Attestation:   Scribe #1: I performed the above scribed service and the documentation accurately describes the services I performed. I attest to the accuracy of the  note.    Attending Attestation:           Physician Attestation for Scribe:  Physician Attestation Statement for Scribe #1: I, Alex Calderon MD, reviewed documentation, as scribed by Aicha Aly in my presence, and it is both accurate and complete.                 ED Course     Clinical Impression:   The primary encounter diagnosis was Acute pancreatitis, unspecified complication status, unspecified pancreatitis type. Diagnoses of Abdominal pain, New onset atrial fibrillation, and Hypertensive heart disease were also pertinent to this visit.                           Alex Calderon MD  08/17/17 0950

## 2017-08-16 NOTE — ASSESSMENT & PLAN NOTE
Tachycardia, HR now at 112 suspect some degree of DTs  Give IV diazapam 2 mg now then Q8H X 3 doses  Reassess in am  Continuous cardiac monitoring

## 2017-08-16 NOTE — ASSESSMENT & PLAN NOTE
BP stable at this time HR elevated suspect early DT as patients last drink was a little more than 24 hours ago  Hold home amlodipine until taking orals  PRN hydralazine Q12H SBP>170

## 2017-08-16 NOTE — NURSING
Pt arrive to the unit by stretcher accompanied by transport.  Assisted pt to bed.  Admit assessment initiated.  Pt is AAOx4.  No distress noted.

## 2017-08-16 NOTE — ASSESSMENT & PLAN NOTE
NPO for now - attempt clear liquids in am - start advancing if tolerated tomorrow after breakfast  Lipase Q12H X 3  Supportive care with IV dilaudid until morning convert to oral percocet when able to tolerate meals  SW for rehab resources

## 2017-08-16 NOTE — SUBJECTIVE & OBJECTIVE
Past Medical History:   Diagnosis Date    Depression     Hypertension        Past Surgical History:   Procedure Laterality Date    HYSTERECTOMY      TOTAL HIP ARTHROPLASTY         Review of patient's allergies indicates:  No Known Allergies    No current facility-administered medications on file prior to encounter.      Current Outpatient Prescriptions on File Prior to Encounter   Medication Sig    amlodipine (NORVASC) 10 MG tablet Take 10 mg by mouth once daily.    doxycycline (VIBRA-TABS) 100 MG tablet Take 1 tablet (100 mg total) by mouth every 12 (twelve) hours.    escitalopram oxalate (LEXAPRO) 20 MG tablet Take 20 mg by mouth once daily.    miconazole nitrate 2% (MICOTIN) 2 % Oint Apply topically 2 (two) times daily.     Family History     None        Social History Main Topics    Smoking status: Former Smoker    Smokeless tobacco: Never Used    Alcohol use Yes      Comment: QOD    Drug use: No    Sexual activity: No     Review of Systems   Constitutional: Negative for appetite change, chills, diaphoresis and fever.   HENT: Negative for congestion, hearing loss, sore throat, tinnitus and trouble swallowing.    Eyes: Negative for photophobia, discharge, itching and visual disturbance.   Respiratory: Negative for apnea, cough, wheezing and stridor.    Cardiovascular: Negative for chest pain, palpitations and leg swelling.   Gastrointestinal: Positive for abdominal pain. Negative for abdominal distention, blood in stool, constipation, diarrhea and nausea.   Endocrine: Negative for polydipsia, polyphagia and polyuria.   Genitourinary: Negative for difficulty urinating, dysuria, flank pain and frequency.   Musculoskeletal: Negative for arthralgias, joint swelling and neck stiffness.   Skin: Negative for color change, rash and wound.   Neurological: Negative for dizziness, tremors, seizures, light-headedness, numbness and headaches.   Hematological: Negative for adenopathy.   Psychiatric/Behavioral:  Negative for hallucinations and self-injury.     Objective:     Vital Signs (Most Recent):  Temp: 98.1 °F (36.7 °C) (08/16/17 1523)  Pulse: 89 (08/16/17 1523)  Resp: 16 (08/16/17 1523)  BP: (!) 149/92 (08/16/17 1523)  SpO2: 100 % (08/16/17 1523) Vital Signs (24h Range):  Temp:  [97.6 °F (36.4 °C)-98.1 °F (36.7 °C)] 98.1 °F (36.7 °C)  Pulse:  [] 89  Resp:  [16] 16  SpO2:  [96 %-100 %] 100 %  BP: (123-149)/(62-92) 149/92     Weight: 81.6 kg (180 lb)  Body mass index is 32.92 kg/m².    Physical Exam   Constitutional: She is oriented to person, place, and time. She appears well-developed and well-nourished. She is cooperative.   HENT:   Head: Normocephalic and atraumatic.   Eyes: Conjunctivae, EOM and lids are normal. Pupils are equal, round, and reactive to light. Scleral icterus is present.   Mild scleral icterus   Neck: Normal range of motion and full passive range of motion without pain. Neck supple. No JVD present. No edema present. No thyroid mass present.   Cardiovascular: S1 normal, S2 normal and intact distal pulses.    No murmur heard.  Abdominal: Soft. Bowel sounds are normal. She exhibits no distension and no abdominal bruit. There is no splenomegaly or hepatomegaly. There is tenderness. There is guarding. There is no CVA tenderness.   Musculoskeletal: Normal range of motion.   Lymphadenopathy:     She has no cervical adenopathy.     She has no axillary adenopathy.   Neurological: She is alert and oriented to person, place, and time. She has normal reflexes. She displays no tremor. She displays no seizure activity.   Skin: Skin is warm, dry and intact.   Psychiatric: She has a normal mood and affect. Her speech is normal. Thought content normal. Cognition and memory are normal.        Significant Labs:   CBC:   Recent Labs  Lab 08/16/17  1150   WBC 10.07   HGB 10.0*   HCT 29.1*   *     CMP:   Recent Labs  Lab 08/16/17  1520      K 4.0      CO2 18*      BUN 11   CREATININE 1.1    CALCIUM 7.8*   PROT 6.9   ALBUMIN 2.9*   BILITOT 1.2*   ALKPHOS 232*   AST 46*   ALT 6*   ANIONGAP 19*   EGFRNONAA 55*     Cardiac Markers: No results for input(s): CKMB, MYOGLOBIN, BNP, TROPISTAT in the last 48 hours.  Coagulation: No results for input(s): INR, APTT in the last 48 hours.    Invalid input(s): PT  Lactic Acid: No results for input(s): LACTATE in the last 48 hours.  Lipase:   Recent Labs  Lab 08/16/17  1520   LIPASE 115*     Lipid Panel: No results for input(s): CHOL, HDL, LDLCALC, TRIG, CHOLHDL in the last 48 hours.  Troponin: No results for input(s): TROPONINI in the last 48 hours.  TSH: No results for input(s): TSH in the last 4320 hours.  Urine Culture: No results for input(s): LABURIN in the last 48 hours.    Significant Imaging:   Imaging Results          X-Ray Abdomen Flat And Erect (Final result)  Result time 08/16/17 12:26:29    Final result by Sobeida Baltazar MD (08/16/17 12:26:29)                 Impression:         Nonobstructive bowel gas pattern.      Electronically signed by: SOBEIDA BALTAZAR MD, MD  Date:     08/16/17  Time:    12:26              Narrative:    Comparison: None.    Findings: Upright and supine views of the abdomen.  Resolution is limited by body habitus with underpenetration. Nonspecific bowel gas pattern without evidence of bowel obstruction.  No small bowel air-fluid levels.  No free air.   No organomegaly or mass effect.   Partially imaged left hip total arthroplasty noted. Included osseous structures show degenerative change without acute process seen. Visualized lung bases are clear.

## 2017-08-17 VITALS
RESPIRATION RATE: 18 BRPM | DIASTOLIC BLOOD PRESSURE: 73 MMHG | HEART RATE: 105 BPM | TEMPERATURE: 99 F | OXYGEN SATURATION: 96 % | BODY MASS INDEX: 33.13 KG/M2 | SYSTOLIC BLOOD PRESSURE: 130 MMHG | HEIGHT: 62 IN | WEIGHT: 180 LBS

## 2017-08-17 PROBLEM — E87.29 INCREASED ANION GAP METABOLIC ACIDOSIS: Status: RESOLVED | Noted: 2017-08-16 | Resolved: 2017-08-17

## 2017-08-17 PROBLEM — I49.9 ARRHYTHMIA: Status: RESOLVED | Noted: 2017-08-16 | Resolved: 2017-08-17

## 2017-08-17 LAB
ANION GAP SERPL CALC-SCNC: 11 MMOL/L
ANION GAP SERPL CALC-SCNC: 16 MMOL/L
ANISOCYTOSIS BLD QL SMEAR: SLIGHT
BASOPHILS # BLD AUTO: 0.03 K/UL
BASOPHILS NFR BLD: 0.4 %
BUN SERPL-MCNC: 8 MG/DL
BUN SERPL-MCNC: 9 MG/DL
CALCIUM SERPL-MCNC: 7.2 MG/DL
CALCIUM SERPL-MCNC: 7.4 MG/DL
CHLORIDE SERPL-SCNC: 106 MMOL/L
CHLORIDE SERPL-SCNC: 109 MMOL/L
CO2 SERPL-SCNC: 20 MMOL/L
CO2 SERPL-SCNC: 22 MMOL/L
CREAT SERPL-MCNC: 0.9 MG/DL
CREAT SERPL-MCNC: 0.9 MG/DL
DIASTOLIC DYSFUNCTION: YES
DIFFERENTIAL METHOD: ABNORMAL
EOSINOPHIL # BLD AUTO: 0.1 K/UL
EOSINOPHIL NFR BLD: 1.3 %
ERYTHROCYTE [DISTWIDTH] IN BLOOD BY AUTOMATED COUNT: 17.6 %
EST. GFR  (AFRICAN AMERICAN): >60 ML/MIN/1.73 M^2
EST. GFR  (AFRICAN AMERICAN): >60 ML/MIN/1.73 M^2
EST. GFR  (NON AFRICAN AMERICAN): >60 ML/MIN/1.73 M^2
EST. GFR  (NON AFRICAN AMERICAN): >60 ML/MIN/1.73 M^2
ESTIMATED PA SYSTOLIC PRESSURE: 40.49
GLOBAL PERICARDIAL EFFUSION: ABNORMAL
GLUCOSE SERPL-MCNC: 102 MG/DL
GLUCOSE SERPL-MCNC: 90 MG/DL
HAV IGM SERPL QL IA: NEGATIVE
HBV CORE IGM SERPL QL IA: NEGATIVE
HBV SURFACE AG SERPL QL IA: NEGATIVE
HCT VFR BLD AUTO: 29.1 %
HCV AB SERPL QL IA: NEGATIVE
HGB BLD-MCNC: 9.1 G/DL
HYPOCHROMIA BLD QL SMEAR: ABNORMAL
LIPASE SERPL-CCNC: 64 U/L
LYMPHOCYTES # BLD AUTO: 2.9 K/UL
LYMPHOCYTES NFR BLD: 38.1 %
MCH RBC QN AUTO: 34.3 PG
MCHC RBC AUTO-ENTMCNC: 31.3 G/DL
MCV RBC AUTO: 110 FL
MITRAL VALVE MOBILITY: NORMAL
MONOCYTES # BLD AUTO: 1.6 K/UL
MONOCYTES NFR BLD: 20.6 %
NEUTROPHILS # BLD AUTO: 3 K/UL
NEUTROPHILS NFR BLD: 40.5 %
NRBC BLD-RTO: 1 /100 WBC
PLATELET # BLD AUTO: 76 K/UL
PLATELET BLD QL SMEAR: ABNORMAL
PMV BLD AUTO: 10.8 FL
POTASSIUM SERPL-SCNC: 2.5 MMOL/L
POTASSIUM SERPL-SCNC: 3 MMOL/L
RBC # BLD AUTO: 2.65 M/UL
RETIRED EF AND QEF - SEE NOTES: 65 (ref 55–65)
SODIUM SERPL-SCNC: 142 MMOL/L
SODIUM SERPL-SCNC: 142 MMOL/L
TARGETS BLD QL SMEAR: ABNORMAL
TRICUSPID VALVE REGURGITATION: ABNORMAL
TROPONIN I SERPL DL<=0.01 NG/ML-MCNC: 0.01 NG/ML
TSH SERPL DL<=0.005 MIU/L-ACNC: 2.6 UIU/ML
WBC # BLD AUTO: 7.56 K/UL

## 2017-08-17 PROCEDURE — 83690 ASSAY OF LIPASE: CPT

## 2017-08-17 PROCEDURE — 93306 TTE W/DOPPLER COMPLETE: CPT | Mod: 26,,, | Performed by: INTERNAL MEDICINE

## 2017-08-17 PROCEDURE — 96376 TX/PRO/DX INJ SAME DRUG ADON: CPT

## 2017-08-17 PROCEDURE — 25000003 PHARM REV CODE 250: Performed by: PHYSICIAN ASSISTANT

## 2017-08-17 PROCEDURE — 36415 COLL VENOUS BLD VENIPUNCTURE: CPT

## 2017-08-17 PROCEDURE — 96374 THER/PROPH/DIAG INJ IV PUSH: CPT

## 2017-08-17 PROCEDURE — 84443 ASSAY THYROID STIM HORMONE: CPT

## 2017-08-17 PROCEDURE — G0378 HOSPITAL OBSERVATION PER HR: HCPCS

## 2017-08-17 PROCEDURE — 93005 ELECTROCARDIOGRAM TRACING: CPT

## 2017-08-17 PROCEDURE — 80048 BASIC METABOLIC PNL TOTAL CA: CPT

## 2017-08-17 PROCEDURE — 93010 ELECTROCARDIOGRAM REPORT: CPT | Mod: ,,, | Performed by: INTERNAL MEDICINE

## 2017-08-17 PROCEDURE — 80048 BASIC METABOLIC PNL TOTAL CA: CPT | Mod: 91

## 2017-08-17 PROCEDURE — 96375 TX/PRO/DX INJ NEW DRUG ADDON: CPT

## 2017-08-17 PROCEDURE — 63600175 PHARM REV CODE 636 W HCPCS: Performed by: NURSE PRACTITIONER

## 2017-08-17 PROCEDURE — 25000003 PHARM REV CODE 250: Performed by: EMERGENCY MEDICINE

## 2017-08-17 PROCEDURE — 93306 TTE W/DOPPLER COMPLETE: CPT

## 2017-08-17 PROCEDURE — 84484 ASSAY OF TROPONIN QUANT: CPT

## 2017-08-17 PROCEDURE — 85025 COMPLETE CBC W/AUTO DIFF WBC: CPT

## 2017-08-17 PROCEDURE — 25000003 PHARM REV CODE 250: Performed by: NURSE PRACTITIONER

## 2017-08-17 RX ORDER — ASPIRIN 81 MG/1
81 TABLET ORAL DAILY
Status: DISCONTINUED | OUTPATIENT
Start: 2017-08-17 | End: 2017-08-17 | Stop reason: HOSPADM

## 2017-08-17 RX ORDER — DEXTROMETHORPHAN POLISTIREX 30 MG/5 ML
1 SUSPENSION, EXTENDED RELEASE 12 HR ORAL ONCE
Status: COMPLETED | OUTPATIENT
Start: 2017-08-17 | End: 2017-08-17

## 2017-08-17 RX ORDER — METOPROLOL TARTRATE 25 MG/1
25 TABLET, FILM COATED ORAL 2 TIMES DAILY
Status: DISCONTINUED | OUTPATIENT
Start: 2017-08-17 | End: 2017-08-17

## 2017-08-17 RX ORDER — POTASSIUM CHLORIDE 20 MEQ/1
60 TABLET, EXTENDED RELEASE ORAL ONCE
Status: COMPLETED | OUTPATIENT
Start: 2017-08-17 | End: 2017-08-17

## 2017-08-17 RX ORDER — IBUPROFEN 600 MG/1
600 TABLET ORAL ONCE
Status: COMPLETED | OUTPATIENT
Start: 2017-08-17 | End: 2017-08-17

## 2017-08-17 RX ORDER — POTASSIUM CHLORIDE 20 MEQ/1
60 TABLET, EXTENDED RELEASE ORAL DAILY
Status: DISCONTINUED | OUTPATIENT
Start: 2017-08-17 | End: 2017-08-17 | Stop reason: HOSPADM

## 2017-08-17 RX ADMIN — POTASSIUM CHLORIDE 60 MEQ: 1500 TABLET, EXTENDED RELEASE ORAL at 04:08

## 2017-08-17 RX ADMIN — HYDROMORPHONE HYDROCHLORIDE 0.5 MG: 2 INJECTION INTRAMUSCULAR; INTRAVENOUS; SUBCUTANEOUS at 12:08

## 2017-08-17 RX ADMIN — MINERAL OIL 1 ENEMA: 100 ENEMA RECTAL at 05:08

## 2017-08-17 RX ADMIN — IBUPROFEN 600 MG: 600 TABLET, FILM COATED ORAL at 10:08

## 2017-08-17 RX ADMIN — DIAZEPAM 2 MG: 5 INJECTION, SOLUTION INTRAMUSCULAR; INTRAVENOUS at 05:08

## 2017-08-17 RX ADMIN — ASPIRIN 81 MG: 81 TABLET, COATED ORAL at 09:08

## 2017-08-17 RX ADMIN — POTASSIUM CHLORIDE 60 MEQ: 1500 TABLET, EXTENDED RELEASE ORAL at 10:08

## 2017-08-17 RX ADMIN — METOPROLOL TARTRATE 25 MG: 25 TABLET ORAL at 09:08

## 2017-08-17 RX ADMIN — SODIUM CHLORIDE: 0.9 INJECTION, SOLUTION INTRAVENOUS at 05:08

## 2017-08-17 RX ADMIN — PANTOPRAZOLE SODIUM 40 MG: 40 TABLET, DELAYED RELEASE ORAL at 08:08

## 2017-08-17 NOTE — PROGRESS NOTES
Angeles called with potasium  results. New orders received.  Stated patient can D/C after dose of potasium given.

## 2017-08-17 NOTE — PLAN OF CARE
08/17/17 1237   Discharge Assessment   Assessment Type Discharge Planning Assessment   Confirmed/corrected address and phone number on facesheet? Yes   Assessment information obtained from? Patient   Expected Length of Stay (days) 1   Communicated expected length of stay with patient/caregiver yes   Prior to hospitilization cognitive status: Alert/Oriented   Prior to hospitalization functional status: Independent  (independent with adl's)   Current cognitive status: Alert/Oriented   Current Functional Status: Independent  (Independdent with adl's)   Arrived From home or self-care   Lives With child(loren), adult;spouse   Able to Return to Prior Arrangements yes   Is patient able to care for self after discharge? Yes   Who are your caregiver(s) and their phone number(s)? spouse and son able to help at home   Patient's perception of discharge disposition home or selfcare   Readmission Within The Last 30 Days other (see comments)  (patient did not follow up with PCP after last DC and became constipation and gassy)   Patient currently being followed by outpatient case management? No   Patient currently receives home health services? No   Equipment Currently Used at Home rollator;walker, rolling;bedside commode;shower chair   Do you have any problems affording any of your prescribed medications? No   Is the patient taking medications as prescribed? yes   Does the patient have transportation to healthcare appointments? Yes   Transportation Available family or friend will provide;Medicaid transportation   On Dialysis? No   Does the patient receive services at the Coumadin Clinic? No   Discharge Plan A Home with family   Patient/Family In Agreement With Plan yes     [facility] [address] [place] [address] - [address]  [address] [place] [address]  Phone: [phone] Fax: [phone]      TN to patient's room to discuss Helping the patient manage care at home.   TN/OPAL roll explained to pt.  Teach back method  used.  TN's name and contact info placed on white board.  Pt/family encouraged to call for any problems/concerns with DC.

## 2017-08-17 NOTE — PLAN OF CARE
08/17/17 1642   Final Note   Assessment Type Final Discharge Note   Discharge Disposition Home   Discharge planning education complete? Yes   What phone number can be called within the next 1-3 days to see how you are doing after discharge? ([phone])   Hospital Follow Up  Appt(s) scheduled? Yes   Discharge plans and expectations educations in teach back method with documentation complete? Yes   Offered [facility] -- Bedside Delivery? n/a   Discharge/Hospital Encounter Summary to (non-[facility]) PCP n/a   Referral to Outpatient Case Management complete? n/a   Referral to / orders for Home Health Complete? n/a   30 day supply of medicines given at discharge, if documented non-compliance / non-adherence? n/a   Any social issues identified prior to discharge? No   Did you assess the readiness or willingness of the family or caregiver to support self management of care? n/a  (no family at bedside)   Right Care Referral Info   Post Acute Recommendation No Care   NurseCinthya notified that all CM needs are met.

## 2017-08-17 NOTE — PLAN OF CARE
Problem: Fall Risk (Adult)  Goal: Identify Related Risk Factors and Signs and Symptoms  Related risk factors and signs and symptoms are identified upon initiation of Human Response Clinical Practice Guideline (CPG)   Outcome: Ongoing (interventions implemented as appropriate)   08/16/17 1901   Fall Risk   Related Risk Factors (Fall Risk) age-related changes;gait/mobility problems;fatigue/slow reaction;polypharmacy;environment unfamiliar   Signs and Symptoms (Fall Risk) presence of risk factors     Goal: Absence of Falls  Patient will demonstrate the desired outcomes by discharge/transition of care.   Outcome: Ongoing (interventions implemented as appropriate)   08/16/17 1901   Fall Risk (Adult)   Absence of Falls making progress toward outcome       Problem: Patient Care Overview  Goal: Plan of Care Review  Outcome: Ongoing (interventions implemented as appropriate)   08/16/17 1901   Coping/Psychosocial   Plan Of Care Reviewed With patient;family       Problem: Pancreatitis, Acute/Chronic (Adult)  Goal: Signs and Symptoms of Listed Potential Problems Will be Absent, Minimized or Managed (Pancreatitis, Acute/Chronic)  Signs and symptoms of listed potential problems will be absent, minimized or managed by discharge/transition of care (reference Pancreatitis, Acute/Chronic (Adult) CPG).  Outcome: Ongoing (interventions implemented as appropriate)   08/16/17 1901   Pancreatitis, Acute/Chronic   Problems Assessed (Pancreatitis) all   Problems Present (Pancreatitis) pain;situational response

## 2017-08-17 NOTE — PROGRESS NOTES
[facility] offered services from the [facility] through [facility], pt agreed to enrollment. Pt enrolled into [facility], [place] Tim notified and Berny the health  has been notified. Pt's follow up appointment is scheduled for 8/21/17 12:40 with Dr. Felix at the [facility].

## 2017-08-17 NOTE — PLAN OF CARE
Problem: Pain, Acute (Adult)  Goal: Acceptable Pain Control/Comfort Level  Patient will demonstrate the desired outcomes by discharge/transition of care.   Outcome: Outcome(s) achieved Date Met: 08/17/17 08/17/17 174   Pain, Acute (Adult)   Acceptable Pain Control/Comfort Level achieves outcome

## 2017-08-17 NOTE — HOSPITAL COURSE
Patient presents with acute pancreatitis secondary to recent abx from hospitalization for panniculitis vs. Alcohol as history of heavy drinking. Lipase 115 on presentation. Placed in observation for IVF and analgesic support. She was mild tachy on presentation thought to be secondary to DTs and with scheduled diazepam. NPO overnight and advanced to clears in am without issue. She was feeling much better, however,  Telemetry monitor with multiple rhythm changes including possible a fib and tachy from 100-140s periodically but never sustained. EKG and troponin ordered along with TSH and echo--all unremarkable. Due to a lag in am lab results it was unknown at that time that patient was with potassium of 2.5. Once this was determined patient given klor con 60 meq with improvement. Repeat BMP revealing potassium of 3.0 thus given another dose of 60 meq. She was able to advance diet and feeling overall improved. Patient remained stable throughout stay and will discharge to home.

## 2017-08-17 NOTE — PLAN OF CARE
Problem: Fall Risk (Adult)  Goal: Identify Related Risk Factors and Signs and Symptoms  Related risk factors and signs and symptoms are identified upon initiation of Human Response Clinical Practice Guideline (CPG)   Outcome: Ongoing (interventions implemented as appropriate)   08/17/17 0028   Fall Risk   Related Risk Factors (Fall Risk) gait/mobility problems;environment unfamiliar;polypharmacy   Signs and Symptoms (Fall Risk) presence of risk factors     Goal: Absence of Falls  Patient will demonstrate the desired outcomes by discharge/transition of care.   Outcome: Ongoing (interventions implemented as appropriate)   08/17/17 0028   Fall Risk (Adult)   Absence of Falls making progress toward outcome       Problem: Patient Care Overview  Goal: Plan of Care Review  Outcome: Ongoing (interventions implemented as appropriate)   08/17/17 0028   Coping/Psychosocial   Plan Of Care Reviewed With patient;family       Problem: Pancreatitis, Acute/Chronic (Adult)  Goal: Signs and Symptoms of Listed Potential Problems Will be Absent, Minimized or Managed (Pancreatitis, Acute/Chronic)  Signs and symptoms of listed potential problems will be absent, minimized or managed by discharge/transition of care (reference Pancreatitis, Acute/Chronic (Adult) CPG).   Outcome: Ongoing (interventions implemented as appropriate)   08/16/17 1901 08/17/17 0028   Pancreatitis, Acute/Chronic   Problems Assessed (Pancreatitis) all --    Problems Present (Pancreatitis) --  pain;nausea and vomiting       Problem: Pain, Acute (Adult)  Goal: Identify Related Risk Factors and Signs and Symptoms  Related risk factors and signs and symptoms are identified upon initiation of Human Response Clinical Practice Guideline (CPG)   Outcome: Ongoing (interventions implemented as appropriate)   08/17/17 0028   Pain, Acute   Related Risk Factors (Acute Pain) positioning   Signs and Symptoms (Acute Pain) verbalization of pain descriptors     Goal: Acceptable Pain  Control/Comfort Level  Patient will demonstrate the desired outcomes by discharge/transition of care.   Outcome: Ongoing (interventions implemented as appropriate)   08/17/17 0028   Pain, Acute (Adult)   Acceptable Pain Control/Comfort Level making progress toward outcome

## 2017-08-18 NOTE — DISCHARGE SUMMARY
[facility] Hospital Medicine  Discharge Summary      Patient Name: Kylie Aleman  MRN: [id number]  Admission Date: 8/16/2017  Hospital Length of Stay: 0 days  Discharge Date and Time: 8/17/2017  7:17 PM  Attending Physician: Naomi att. providers found   Discharging Provider: Angeles Mendoza PA-C  Primary Care Provider: Lulu Mcnamara MD      HPI:   Kylie Aleman is a 57 y.o. female with past medical history of Depression, Chronic lumbar back pain, and Hypertension. She presented for evaluation of a 2-3 day history of epigastric abdominal aching type pain 8/10 with associated constipation, abdominal distention, nausea without vomiting, and difficulty passing gas. Per patient it felt like she needed to have a BM but couldn't. Patient recently discharged from hospital 1 week ago after treatment for panniculitis with IV ABX. She denies CP, headache, constipation, diarrhea or CVA tenderness. Patient endorses drinking about 2 drinks of rum daily. Her family, who are at the bedside, beckoned me outside of the room, after interview and reported that patient drinks significantly more alcohol daily than she reported. They voiced concern regarding her dependence.     At the time of presentation patient was afebrile without leukocytosis. Abd XR significant for non-obstructive bowel gas pattern. Labs significant for AG=19, CO2=18, elevated lipase= 115, and AST 46, ALT 6       * No surgery found *      Indwelling Lines/Drains at time of discharge:   Lines/Drains/Airways          No matching active lines, drains, or airways        Hospital Course:   Patient presents with acute pancreatitis secondary to recent abx from hospitalization for panniculitis vs. Alcohol as history of heavy drinking. Lipase 115 on presentation. Placed in observation for IVF and analgesic support. She was mild tachy on presentation thought to be secondary to DTs and with scheduled diazepam. NPO overnight and advanced to clears in am  without issue. She was feeling much better, however,  Telemetry monitor with multiple rhythm changes including possible a fib and tachy from 100-140s periodically but never sustained. EKG and troponin ordered along with TSH and echo--all unremarkable. Due to a lag in am lab results it was unknown at that time that patient was with potassium of 2.5. Once this was determined patient given klor con 60 meq with improvement. Repeat BMP revealing potassium of 3.0 thus given another dose of 60 meq. She was able to advance diet and feeling overall improved. Patient remained stable throughout stay and will discharge to home.      Consults:   Consults         Status Ordering Provider     Inpatient consult to Social Work/Case Management  Once     Provider:  (Not yet assigned)    Completed LUIZ OCHOA          Significant Diagnostic Studies: Labs:   CMP   Recent Labs  Lab 08/16/17  1520 08/16/17  2016 08/17/17  0936 08/17/17  1536    140 142 142   K 4.0 3.3* 2.5* 3.0*    103 106 109   CO2 18* 20* 20* 22*    86 90 102   BUN 11 10 9 8   CREATININE 1.1 1.1 0.9 0.9   CALCIUM 7.8* 7.9* 7.4* 7.2*   PROT 6.9  --   --   --    ALBUMIN 2.9*  --   --   --    BILITOT 1.2*  --   --   --    ALKPHOS 232*  --   --   --    AST 46*  --   --   --    ALT 6*  --   --   --    ANIONGAP 19* 17* 16 11   ESTGFRAFRICA >60 >60 >60 >60   EGFRNONAA 55* 55* >60 >60    and CBC   Recent Labs  Lab 08/16/17  1150 08/17/17  0516   WBC 10.07 7.56   HGB 10.0* 9.1*   HCT 29.1* 29.1*   * 76*       Pending Diagnostic Studies:     None        Final Active Diagnoses:    Diagnosis Date Noted POA    PRINCIPAL PROBLEM:  Acute pancreatitis [K85.90] 08/16/2017 Yes    Essential hypertension [I10] 08/10/2017 Yes    Panniculitis [M79.3] 08/09/2017 Yes      Problems Resolved During this Admission:    Diagnosis Date Noted Date Resolved POA    Increased anion gap metabolic acidosis [E87.2] 08/16/2017 08/17/2017 Yes    Arrhythmia [I49.9]  08/16/2017 08/17/2017 Yes      No new Assessment & Plan notes have been filed under this hospital service since the last note was generated.  Service: Hospital Medicine      Discharged Condition: stable    Disposition: Home or Self Care    Follow Up:  Follow-up Information     [facility] On 8/21/2017.    Why:  Outpatient Services, Dr. Kam, please arrive at 12:30  Contact information:  [address] [facility] [address]  [phone]                 Patient Instructions:     Diet general     Diet Cardiac     Activity as tolerated       Medications:  Reconciled Home Medications:   Discharge Medication List as of 8/17/2017  5:44 PM      CONTINUE these medications which have NOT CHANGED    Details   amlodipine (NORVASC) 10 MG tablet Take 10 mg by mouth once daily., Historical Med      doxycycline (VIBRA-TABS) 100 MG tablet Take 1 tablet (100 mg total) by mouth every 12 (twelve) hours., Starting Sat 8/12/2017, Until Sat 8/19/2017, Print      escitalopram oxalate (LEXAPRO) 20 MG tablet Take 20 mg by mouth once daily., Historical Med      miconazole nitrate 2% (MICOTIN) 2 % Oint Apply topically 2 (two) times daily., Starting Sat 8/12/2017, Print           Time spent on the discharge of patient: less than thirty minutes    Angeles Mendoza PA-C  Hospitalist-Department of Hospital Medicine  [address] [place] [address]  Office [phone] Pager [phone]

## 2017-08-21 ENCOUNTER — HOSPITAL ENCOUNTER (OUTPATIENT)
Facility: HOSPITAL | Age: 57
Discharge: HOME | End: 2017-08-22
Attending: EMERGENCY MEDICINE | Admitting: HOSPITALIST
Payer: MEDICAID

## 2017-08-21 DIAGNOSIS — R20.0 NUMBNESS AND TINGLING OF BOTH FEET: ICD-10-CM

## 2017-08-21 DIAGNOSIS — E83.42 HYPOMAGNESEMIA: ICD-10-CM

## 2017-08-21 DIAGNOSIS — I10 ESSENTIAL HYPERTENSION: ICD-10-CM

## 2017-08-21 DIAGNOSIS — R20.2 NUMBNESS AND TINGLING OF BOTH FEET: ICD-10-CM

## 2017-08-21 DIAGNOSIS — R53.81 PHYSICAL DECONDITIONING: Primary | ICD-10-CM

## 2017-08-21 LAB
ALBUMIN SERPL BCP-MCNC: 2.9 G/DL
ALP SERPL-CCNC: 237 U/L
ALT SERPL W/O P-5'-P-CCNC: 11 U/L
ANION GAP SERPL CALC-SCNC: 16 MMOL/L
AST SERPL-CCNC: 50 U/L
BACTERIA #/AREA URNS HPF: ABNORMAL /HPF
BASOPHILS # BLD AUTO: 0.05 K/UL
BASOPHILS NFR BLD: 0.4 %
BILIRUB SERPL-MCNC: 1.6 MG/DL
BILIRUB UR QL STRIP: NEGATIVE
BUN SERPL-MCNC: 9 MG/DL
CALCIUM SERPL-MCNC: 8.2 MG/DL
CHLORIDE SERPL-SCNC: 105 MMOL/L
CK SERPL-CCNC: 65 U/L
CLARITY UR: ABNORMAL
CO2 SERPL-SCNC: 22 MMOL/L
COLOR UR: YELLOW
CREAT SERPL-MCNC: 1.2 MG/DL
DIFFERENTIAL METHOD: ABNORMAL
EOSINOPHIL # BLD AUTO: 0.3 K/UL
EOSINOPHIL NFR BLD: 2.2 %
ERYTHROCYTE [DISTWIDTH] IN BLOOD BY AUTOMATED COUNT: 17.5 %
EST. GFR  (AFRICAN AMERICAN): 57 ML/MIN/1.73 M^2
EST. GFR  (NON AFRICAN AMERICAN): 50 ML/MIN/1.73 M^2
FOLATE SERPL-MCNC: 2.3 NG/ML
GLUCOSE SERPL-MCNC: 90 MG/DL
GLUCOSE UR QL STRIP: NEGATIVE
HCT VFR BLD AUTO: 27.2 %
HGB BLD-MCNC: 9.5 G/DL
HGB UR QL STRIP: NEGATIVE
HYALINE CASTS #/AREA URNS LPF: 10 /LPF
KETONES UR QL STRIP: ABNORMAL
LEUKOCYTE ESTERASE UR QL STRIP: ABNORMAL
LIPASE SERPL-CCNC: 106 U/L
LYMPHOCYTES # BLD AUTO: 2.2 K/UL
LYMPHOCYTES NFR BLD: 18.3 %
MAGNESIUM SERPL-MCNC: 1 MG/DL
MAGNESIUM SERPL-MCNC: 1 MG/DL
MCH RBC QN AUTO: 38.6 PG
MCHC RBC AUTO-ENTMCNC: 34.9 G/DL
MCV RBC AUTO: 111 FL
MICROSCOPIC COMMENT: ABNORMAL
MONOCYTES # BLD AUTO: 1.7 K/UL
MONOCYTES NFR BLD: 14.3 %
NEUTROPHILS # BLD AUTO: 7.8 K/UL
NEUTROPHILS NFR BLD: 64.8 %
NITRITE UR QL STRIP: NEGATIVE
PH UR STRIP: 5 [PH] (ref 5–8)
PLATELET # BLD AUTO: 222 K/UL
PMV BLD AUTO: 10.3 FL
POTASSIUM SERPL-SCNC: 3.4 MMOL/L
PROT SERPL-MCNC: 7.4 G/DL
PROT UR QL STRIP: NEGATIVE
RBC # BLD AUTO: 2.46 M/UL
RBC #/AREA URNS HPF: 2 /HPF (ref 0–4)
SODIUM SERPL-SCNC: 143 MMOL/L
SP GR UR STRIP: 1.01 (ref 1–1.03)
SQUAMOUS #/AREA URNS HPF: 5 /HPF
T4 FREE SERPL-MCNC: 1.5 NG/DL
TSH SERPL DL<=0.005 MIU/L-ACNC: 4.32 UIU/ML
URN SPEC COLLECT METH UR: ABNORMAL
UROBILINOGEN UR STRIP-ACNC: NEGATIVE EU/DL
VIT B12 SERPL-MCNC: 421 PG/ML
WBC # BLD AUTO: 12.11 K/UL
WBC #/AREA URNS HPF: 15 /HPF (ref 0–5)
YEAST URNS QL MICRO: ABNORMAL

## 2017-08-21 PROCEDURE — 99203 OFFICE O/P NEW LOW 30 MIN: CPT | Mod: ,,, | Performed by: PSYCHIATRY & NEUROLOGY

## 2017-08-21 PROCEDURE — G0378 HOSPITAL OBSERVATION PER HR: HCPCS

## 2017-08-21 PROCEDURE — 84443 ASSAY THYROID STIM HORMONE: CPT

## 2017-08-21 PROCEDURE — 36000 PLACE NEEDLE IN VEIN: CPT

## 2017-08-21 PROCEDURE — 25000003 PHARM REV CODE 250: Performed by: EMERGENCY MEDICINE

## 2017-08-21 PROCEDURE — 82607 VITAMIN B-12: CPT

## 2017-08-21 PROCEDURE — 83690 ASSAY OF LIPASE: CPT

## 2017-08-21 PROCEDURE — 84425 ASSAY OF VITAMIN B-1: CPT

## 2017-08-21 PROCEDURE — 87077 CULTURE AEROBIC IDENTIFY: CPT

## 2017-08-21 PROCEDURE — 80053 COMPREHEN METABOLIC PANEL: CPT

## 2017-08-21 PROCEDURE — 63600175 PHARM REV CODE 636 W HCPCS: Performed by: NURSE PRACTITIONER

## 2017-08-21 PROCEDURE — 80307 DRUG TEST PRSMV CHEM ANLYZR: CPT

## 2017-08-21 PROCEDURE — 82550 ASSAY OF CK (CPK): CPT

## 2017-08-21 PROCEDURE — 25000003 PHARM REV CODE 250: Performed by: NURSE PRACTITIONER

## 2017-08-21 PROCEDURE — 99285 EMERGENCY DEPT VISIT HI MDM: CPT | Mod: 25

## 2017-08-21 PROCEDURE — 85025 COMPLETE CBC W/AUTO DIFF WBC: CPT

## 2017-08-21 PROCEDURE — 82746 ASSAY OF FOLIC ACID SERUM: CPT

## 2017-08-21 PROCEDURE — 87086 URINE CULTURE/COLONY COUNT: CPT

## 2017-08-21 PROCEDURE — 81000 URINALYSIS NONAUTO W/SCOPE: CPT

## 2017-08-21 PROCEDURE — 84439 ASSAY OF FREE THYROXINE: CPT

## 2017-08-21 PROCEDURE — 87186 SC STD MICRODIL/AGAR DIL: CPT

## 2017-08-21 PROCEDURE — 87088 URINE BACTERIA CULTURE: CPT

## 2017-08-21 PROCEDURE — 36415 COLL VENOUS BLD VENIPUNCTURE: CPT

## 2017-08-21 PROCEDURE — 83735 ASSAY OF MAGNESIUM: CPT | Mod: 91

## 2017-08-21 RX ORDER — LORAZEPAM/0.9% SODIUM CHLORIDE 100MG/0.1L
2 PLASTIC BAG, INJECTION (ML) INTRAVENOUS
Status: COMPLETED | OUTPATIENT
Start: 2017-08-21 | End: 2017-08-21

## 2017-08-21 RX ORDER — ONDANSETRON 2 MG/ML
4 INJECTION INTRAMUSCULAR; INTRAVENOUS EVERY 12 HOURS PRN
Status: DISCONTINUED | OUTPATIENT
Start: 2017-08-21 | End: 2017-08-22 | Stop reason: HOSPADM

## 2017-08-21 RX ORDER — DIAZEPAM 2 MG/1
2 TABLET ORAL EVERY 6 HOURS PRN
Status: DISCONTINUED | OUTPATIENT
Start: 2017-08-21 | End: 2017-08-21

## 2017-08-21 RX ORDER — ESCITALOPRAM OXALATE 10 MG/1
20 TABLET ORAL DAILY
Status: DISCONTINUED | OUTPATIENT
Start: 2017-08-21 | End: 2017-08-22 | Stop reason: HOSPADM

## 2017-08-21 RX ORDER — DIAZEPAM 2 MG/1
2 TABLET ORAL EVERY 6 HOURS PRN
Status: DISCONTINUED | OUTPATIENT
Start: 2017-08-21 | End: 2017-08-22 | Stop reason: HOSPADM

## 2017-08-21 RX ORDER — ENOXAPARIN SODIUM 100 MG/ML
40 INJECTION SUBCUTANEOUS EVERY 24 HOURS
Status: DISCONTINUED | OUTPATIENT
Start: 2017-08-21 | End: 2017-08-22 | Stop reason: HOSPADM

## 2017-08-21 RX ORDER — DULOXETIN HYDROCHLORIDE 20 MG/1
20 CAPSULE, DELAYED RELEASE ORAL 2 TIMES DAILY
Status: DISCONTINUED | OUTPATIENT
Start: 2017-08-21 | End: 2017-08-22 | Stop reason: HOSPADM

## 2017-08-21 RX ORDER — FOLIC ACID 1 MG/1
1 TABLET ORAL DAILY
Status: DISCONTINUED | OUTPATIENT
Start: 2017-08-21 | End: 2017-08-21

## 2017-08-21 RX ORDER — FOLIC ACID 1 MG/1
2 TABLET ORAL DAILY
Status: DISCONTINUED | OUTPATIENT
Start: 2017-08-22 | End: 2017-08-22 | Stop reason: HOSPADM

## 2017-08-21 RX ORDER — HYDRALAZINE HYDROCHLORIDE 20 MG/ML
10 INJECTION INTRAMUSCULAR; INTRAVENOUS EVERY 8 HOURS PRN
Status: DISCONTINUED | OUTPATIENT
Start: 2017-08-21 | End: 2017-08-22 | Stop reason: HOSPADM

## 2017-08-21 RX ORDER — AMLODIPINE BESYLATE 5 MG/1
10 TABLET ORAL DAILY
Status: DISCONTINUED | OUTPATIENT
Start: 2017-08-21 | End: 2017-08-22 | Stop reason: HOSPADM

## 2017-08-21 RX ORDER — ENOXAPARIN SODIUM 100 MG/ML
40 INJECTION SUBCUTANEOUS EVERY 24 HOURS
Status: DISCONTINUED | OUTPATIENT
Start: 2017-08-21 | End: 2017-08-22 | Stop reason: SDUPTHER

## 2017-08-21 RX ORDER — THIAMINE HCL 100 MG
100 TABLET ORAL DAILY
Status: DISCONTINUED | OUTPATIENT
Start: 2017-08-21 | End: 2017-08-22 | Stop reason: HOSPADM

## 2017-08-21 RX ORDER — POLYETHYLENE GLYCOL 3350 17 G/17G
17 POWDER, FOR SOLUTION ORAL DAILY
Status: DISCONTINUED | OUTPATIENT
Start: 2017-08-21 | End: 2017-08-22 | Stop reason: HOSPADM

## 2017-08-21 RX ORDER — HYDROCODONE BITARTRATE AND ACETAMINOPHEN 5; 325 MG/1; MG/1
1 TABLET ORAL EVERY 4 HOURS PRN
Status: DISCONTINUED | OUTPATIENT
Start: 2017-08-21 | End: 2017-08-22 | Stop reason: HOSPADM

## 2017-08-21 RX ORDER — RAMELTEON 8 MG/1
8 TABLET ORAL NIGHTLY PRN
Status: DISCONTINUED | OUTPATIENT
Start: 2017-08-21 | End: 2017-08-22 | Stop reason: HOSPADM

## 2017-08-21 RX ADMIN — AMLODIPINE BESYLATE 10 MG: 5 TABLET ORAL at 01:08

## 2017-08-21 RX ADMIN — FOLIC ACID 1 MG: 1 TABLET ORAL at 04:08

## 2017-08-21 RX ADMIN — THIAMINE HCL TAB 100 MG 100 MG: 100 TAB at 04:08

## 2017-08-21 RX ADMIN — ENOXAPARIN SODIUM 40 MG: 100 INJECTION SUBCUTANEOUS at 04:08

## 2017-08-21 RX ADMIN — Medication 2 G: at 05:08

## 2017-08-21 RX ADMIN — POLYETHYLENE GLYCOL 3350 17 G: 17 POWDER, FOR SOLUTION ORAL at 03:08

## 2017-08-21 RX ADMIN — ESCITALOPRAM OXALATE 20 MG: 10 TABLET ORAL at 04:08

## 2017-08-21 RX ADMIN — DULOXETINE HYDROCHLORIDE 20 MG: 20 CAPSULE, DELAYED RELEASE ORAL at 08:08

## 2017-08-21 RX ADMIN — MICONAZOLE NITRATE: 20 OINTMENT TOPICAL at 10:08

## 2017-08-21 RX ADMIN — HYDROCODONE BITARTRATE AND ACETAMINOPHEN 1 TABLET: 5; 325 TABLET ORAL at 10:08

## 2017-08-21 RX ADMIN — Medication 2 G: at 04:08

## 2017-08-21 NOTE — ASSESSMENT & PLAN NOTE
Patient with chronic alcoholism, suspected source of low mag. She denies alcohol since July 21st, 2017. Was just hospitalized for pancreatitis her levels were noted to be normalizing at the time of discharge but are higher now. Magnesium level not checked during last hospitaliztion  -Replace mag with IV and recheck in am  -Monitor potassium level and replenish with oral  -IV fluids for rehydration  -supportive care for NV

## 2017-08-21 NOTE — ASSESSMENT & PLAN NOTE
Patient with chronic alcoholism, suspected source of low mag. She denies alcohol since July 21st, 2017. Was just hospitalized for pancreatitis her levels were noted to be normalizing at the time of discharge but are higher now. Magnesium level not checked during last hospitaliztion  -Replace mag with IV and recheck in am  -Monitor potassium level and replenish with oral  -IV fluids for rehydration  -supportive care for NV  -Neurology consult

## 2017-08-21 NOTE — CONSULTS
[facility]  Neurology  Consult Note    Patient Name: Kylie Aleman  MRN: [id number]  Admission Date: 8/21/2017  Hospital Length of Stay: 0 days  Code Status: Full Code   Attending Provider: Katheryn Cool MD   Consulting Provider: Saúl Avelar MD  Primary Care Physician: Lulu Mcnamara MD  Principal Problem:Hypomagnesemia    Inpatient consult to Neurology  Consult performed by: SAÚL AVELAR  Consult ordered by: ABHILASH STEARNS        Subjective:     Chief Complaint: Numbness/Tingling     HPI: 58 y/o female with medical Hx as listed below comes to ED for evaluation of numbness and pain in feet. Pt states that actually symptoms are not new and have been occurring for at least two months. Ms. Aleman reports numbness and tingling in feet to level of ankles as well as burning pain. She also has a Hx of arthritis and experiences pain in knees and hips for which is mostly sedentary at home. Denies weakness in UE's, vertigo, speech or visual disturbances. No bowel/bladder incontinence.    Past Medical History:   Diagnosis Date    Depression     Hypertension        Past Surgical History:   Procedure Laterality Date    HYSTERECTOMY      TOTAL HIP ARTHROPLASTY         Review of patient's allergies indicates:  No Known Allergies    Current Neurological Medications:     No current facility-administered medications on file prior to encounter.      Current Outpatient Prescriptions on File Prior to Encounter   Medication Sig    amlodipine (NORVASC) 10 MG tablet Take 10 mg by mouth once daily.    escitalopram oxalate (LEXAPRO) 20 MG tablet Take 20 mg by mouth once daily.    miconazole nitrate 2% (MICOTIN) 2 % Oint Apply topically 2 (two) times daily.      Family History     None        Social History Main Topics    Smoking status: Former Smoker    Smokeless tobacco: Never Used    Alcohol use Yes      Comment: QOD    Drug use: No    Sexual activity: No     Review of Systems   Constitutional:  Negative for fever and unexpected weight change.   HENT: Negative for trouble swallowing and voice change.    Eyes: Negative for photophobia.   Respiratory: Negative for chest tightness and shortness of breath.    Cardiovascular: Negative for chest pain and palpitations.   Gastrointestinal: Negative for abdominal pain.   Genitourinary: Negative for dysuria.   Musculoskeletal: Positive for arthralgias and back pain.   Neurological: Negative for dizziness, tremors, facial asymmetry, speech difficulty, weakness, light-headedness, numbness and headaches.          Objective:     Vital Signs (Most Recent):  Temp: 97.6 °F (36.4 °C) (08/21/17 1310)  Pulse: (!) 118 (08/21/17 1406)  Resp: 18 (08/21/17 0926)  BP: (!) 146/92 (08/21/17 1406)  SpO2: (!) 90 % (08/21/17 1406) Vital Signs (24h Range):  Temp:  [97.6 °F (36.4 °C)-98.2 °F (36.8 °C)] 97.6 °F (36.4 °C)  Pulse:  [] 118  Resp:  [18] 18  SpO2:  [90 %-100 %] 90 %  BP: (142-193)/() 146/92     Weight: 81.6 kg (180 lb)  Body mass index is 32.92 kg/m².    Physical Exam  Constitutional: well-developed  Head: normocephalic.  Eyes: conjunctivae/corneas clear  Nose: no discharge, no epistaxis  Heart: regular rate and rhythm.  Abdomen: depressible; no pain to palpation  Extremities: no cyanosis; no edema  Neurologic: Mental status: alert; oriented to person, place, time  Cranial nerves: field deficit right temporal/left nasal; pupils are round at 3 mm and reactive to light; EOMI, no nystagmus; no facial asymmetry; protrudes tongue midline; SCM/tap 5/5  Strength: 5/5 in UE's; LE's; no drift  DTR's: 1+ in UE; 0 in LE's  Decreased vibration from mid lower leg down; decreased vibration in feet. Intact pinprick.       Significant Labs:   CBC:   Recent Labs  Lab 08/21/17  1040   WBC 12.11   HGB 9.5*   HCT 27.2*        CMP:   Recent Labs  Lab 08/21/17  1040   GLU 90      K 3.4*      CO2 22*   BUN 9   CREATININE 1.2   CALCIUM 8.2*   MG 1.0*   PROT 7.4   ALBUMIN  2.9*   BILITOT 1.6*   ALKPHOS 237*   AST 50*   ALT 11   ANIONGAP 16   EGFRNONAA 50*         Assessment and Plan:     56 y/o female numbness and tingling if distal lower limbs    1. Paresthesias: Hx and exam suggests a peripheral neuropathy given symmetry of symptoms and findings in distal lower extremities.   -Pt needs outpatient EMG/NCS.   -Vit B12, TSH. Possible SPEP.   -Trial of gabapentin 300 mg three times daily.    Active Diagnoses:    Diagnosis Date Noted POA    PRINCIPAL PROBLEM:  Hypomagnesemia [E83.42] 08/21/2017 Yes    Acute pancreatitis [K85.90] 08/16/2017 Yes    Essential hypertension [I10] 08/10/2017 Yes    Depression [F32.9] 08/10/2017 Yes    Panniculitis [M79.3] 08/09/2017 Yes      Problems Resolved During this Admission:    Diagnosis Date Noted Date Resolved POA       VTE Risk Mitigation         Ordered     enoxaparin injection 40 mg  Daily     Route:  Subcutaneous        08/21/17 1505     Medium Risk of VTE  Once      08/21/17 1505     enoxaparin injection 40 mg  Daily     Route:  Subcutaneous        08/21/17 1448          Thank you for your consult. I will follow-up with patient. Please contact us if you have any additional questions.    Saúl Barrera MD  Neurology  [facility]

## 2017-08-21 NOTE — SUBJECTIVE & OBJECTIVE
Past Medical History:   Diagnosis Date    Depression     Hypertension        Past Surgical History:   Procedure Laterality Date    HYSTERECTOMY      TOTAL HIP ARTHROPLASTY         Review of patient's allergies indicates:  No Known Allergies    No current facility-administered medications on file prior to encounter.      Current Outpatient Prescriptions on File Prior to Encounter   Medication Sig    amlodipine (NORVASC) 10 MG tablet Take 10 mg by mouth once daily.    escitalopram oxalate (LEXAPRO) 20 MG tablet Take 20 mg by mouth once daily.    miconazole nitrate 2% (MICOTIN) 2 % Oint Apply topically 2 (two) times daily.     Family History     None        Social History Main Topics    Smoking status: Former Smoker    Smokeless tobacco: Never Used    Alcohol use Yes      Comment: QOD    Drug use: No    Sexual activity: No     Review of Systems   Constitutional: Negative for appetite change, chills, diaphoresis and fever.   HENT: Negative for congestion, hearing loss, sore throat, tinnitus and trouble swallowing.    Eyes: Negative for photophobia, discharge, itching and visual disturbance.   Respiratory: Negative for apnea, cough, wheezing and stridor.    Cardiovascular: Negative for chest pain, palpitations and leg swelling.   Gastrointestinal: Negative for abdominal distention, abdominal pain, blood in stool, constipation, diarrhea and nausea.   Endocrine: Negative for polydipsia, polyphagia and polyuria.   Genitourinary: Negative for difficulty urinating, dysuria, flank pain and frequency.   Musculoskeletal: Negative for arthralgias, joint swelling and neck stiffness.   Skin: Negative for color change, rash and wound.   Neurological: Negative for dizziness, tremors, seizures, light-headedness, numbness and headaches.   Hematological: Negative for adenopathy.   Psychiatric/Behavioral: Negative for hallucinations and self-injury.     Objective:     Vital Signs (Most Recent):  Temp: 98.2 °F (36.8 °C)  (08/21/17 0926)  Pulse: 94 (08/21/17 1127)  Resp: 18 (08/21/17 0926)  BP: (!) 188/84 (08/21/17 1127)  SpO2: 100 % (08/21/17 1127) Vital Signs (24h Range):  Temp:  [98.2 °F (36.8 °C)] 98.2 °F (36.8 °C)  Pulse:  [] 94  Resp:  [18] 18  SpO2:  [100 %] 100 %  BP: (142-188)/(79-93) 188/84     Weight: 81.6 kg (180 lb)  Body mass index is 32.92 kg/m².    Physical Exam   Constitutional: She appears well-developed and well-nourished. She is cooperative.   HENT:   Head: Normocephalic and atraumatic.   Eyes: Conjunctivae, EOM and lids are normal. Pupils are equal, round, and reactive to light.   Wears RX glasses   Neck: Normal range of motion and full passive range of motion without pain. Neck supple. No JVD present. No edema present. No thyroid mass present.   Cardiovascular: S1 normal, S2 normal and intact distal pulses.    No murmur heard.  Abdominal: Soft. Bowel sounds are normal. She exhibits no distension and no abdominal bruit. There is no splenomegaly or hepatomegaly. There is no tenderness. There is no CVA tenderness.   Lymphadenopathy:     She has no cervical adenopathy.     She has no axillary adenopathy.   Neurological: She is alert. She has normal reflexes. She displays no tremor. She displays no seizure activity.   Skin: Skin is warm, dry and intact.   Psychiatric: She has a normal mood and affect. Her speech is normal. Thought content normal. Cognition and memory are normal.        Significant Labs:   CBC:   Recent Labs  Lab 08/21/17  1040   WBC 12.11   HGB 9.5*   HCT 27.2*        CMP:   Recent Labs  Lab 08/21/17  1040      K 3.4*      CO2 22*   GLU 90   BUN 9   CREATININE 1.2   CALCIUM 8.2*   PROT 7.4   ALBUMIN 2.9*   BILITOT 1.6*   ALKPHOS 237*   AST 50*   ALT 11   ANIONGAP 16   EGFRNONAA 50*     Lactic Acid: No results for input(s): LACTATE in the last 48 hours.  Lipase: No results for input(s): LIPASE in the last 48 hours.  Lipid Panel: No results for input(s): CHOL, HDL, LDLCALC,  TRIG, CHOLHDL in the last 48 hours.  Magnesium:   Recent Labs  Lab 08/21/17  1040   MG 1.0*     TSH:   Recent Labs  Lab 08/21/17  1040   TSH 4.320*       Significant Imaging:   Imaging Results    None

## 2017-08-21 NOTE — ASSESSMENT & PLAN NOTE
Reports mild epigastric abdominal pain; lipase 115 - likely chronic pancreatitis  Attempt   Clear liquids

## 2017-08-21 NOTE — ED NOTES
Spoke with Dr. Nguyen about patient's HR and Bp. Wendy said it was ok to release patient's amlodipine 10mg and give.

## 2017-08-21 NOTE — ASSESSMENT & PLAN NOTE
Depression may be contributory to her excessive drinking and non-specific body aches.   Start Cymbalta

## 2017-08-21 NOTE — H&P
"[facility] Hospital Medicine  History & Physical    Patient Name: Kylie Aleman  MRN: [id number]  Admission Date: 8/21/2017  Attending Physician: Alex Nguyen MD   Primary Care Provider: Lulu Mcnamara MD         Patient information was obtained from patient and ER records.     Subjective:     Principal Problem:Hypomagnesemia    Chief Complaint:   Chief Complaint   Patient presents with    Extremity Weakness     Pt reports increased leg weakness over the course of several months. Pt reports she has also had increased falls related to the weakness.         HPI: Kylie Aleman is a 57 y.o. female with medical history Depression, Chronic lumbar back pain, and Hypertension. Patient presented for evaluation after an acute fall this morning. She reports that her son was trying to place in her wheelchair but it wasn't lock and she fell, but she states that she didn't fall she just sat down. She reports that she has been feeling numbness in lower extremities. She was recently laid off from [employer] due to her chronic back pain and tells me she has been sorely depressed which is why "I don't really have an appetite".    Patient was found to have mild dehydration, elevated lipase higher than last admit 106 and multiple electrolyte imbalances specifically low magnesium of 1.96.    Of Note: Recently DCed on 8/18/17 after hospitalization for NV    Past Medical History:   Diagnosis Date    Depression     Hypertension        Past Surgical History:   Procedure Laterality Date    HYSTERECTOMY      TOTAL HIP ARTHROPLASTY         Review of patient's allergies indicates:  No Known Allergies    No current facility-administered medications on file prior to encounter.      Current Outpatient Prescriptions on File Prior to Encounter   Medication Sig    amlodipine (NORVASC) 10 MG tablet Take 10 mg by mouth once daily.    escitalopram oxalate (LEXAPRO) 20 MG tablet Take 20 mg by mouth once daily.    " miconazole nitrate 2% (MICOTIN) 2 % Oint Apply topically 2 (two) times daily.     Family History     None        Social History Main Topics    Smoking status: Former Smoker    Smokeless tobacco: Never Used    Alcohol use Yes      Comment: QOD    Drug use: No    Sexual activity: No     Review of Systems   Constitutional: Negative for appetite change, chills, diaphoresis and fever.   HENT: Negative for congestion, hearing loss, sore throat, tinnitus and trouble swallowing.    Eyes: Negative for photophobia, discharge, itching and visual disturbance.   Respiratory: Negative for apnea, cough, wheezing and stridor.    Cardiovascular: Negative for chest pain, palpitations and leg swelling.   Gastrointestinal: Negative for abdominal distention, abdominal pain, blood in stool, constipation, diarrhea and nausea.   Endocrine: Negative for polydipsia, polyphagia and polyuria.   Genitourinary: Negative for difficulty urinating, dysuria, flank pain and frequency.   Musculoskeletal: Negative for arthralgias, joint swelling and neck stiffness.   Skin: Negative for color change, rash and wound.   Neurological: Negative for dizziness, tremors, seizures, light-headedness, numbness and headaches.   Hematological: Negative for adenopathy.   Psychiatric/Behavioral: Negative for hallucinations and self-injury.     Objective:     Vital Signs (Most Recent):  Temp: 98.2 °F (36.8 °C) (08/21/17 0926)  Pulse: 94 (08/21/17 1127)  Resp: 18 (08/21/17 0926)  BP: (!) 188/84 (08/21/17 1127)  SpO2: 100 % (08/21/17 1127) Vital Signs (24h Range):  Temp:  [98.2 °F (36.8 °C)] 98.2 °F (36.8 °C)  Pulse:  [] 94  Resp:  [18] 18  SpO2:  [100 %] 100 %  BP: (142-188)/(79-93) 188/84     Weight: 81.6 kg (180 lb)  Body mass index is 32.92 kg/m².    Physical Exam   Constitutional: She appears well-developed and well-nourished. She is cooperative.   HENT:   Head: Normocephalic and atraumatic.   Eyes: Conjunctivae, EOM and lids are normal. Pupils are  equal, round, and reactive to light.   Wears RX glasses   Neck: Normal range of motion and full passive range of motion without pain. Neck supple. No JVD present. No edema present. No thyroid mass present.   Cardiovascular: S1 normal, S2 normal and intact distal pulses.    No murmur heard.  Abdominal: Soft. Bowel sounds are normal. She exhibits no distension and no abdominal bruit. There is no splenomegaly or hepatomegaly. There is no tenderness. There is no CVA tenderness.   Lymphadenopathy:     She has no cervical adenopathy.     She has no axillary adenopathy.   Neurological: She is alert. She has normal reflexes. She displays no tremor. She displays no seizure activity.   Skin: Skin is warm, dry and intact.   Psychiatric: She has a normal mood and affect. Her speech is normal. Thought content normal. Cognition and memory are normal.        Significant Labs:   CBC:   Recent Labs  Lab 08/21/17  1040   WBC 12.11   HGB 9.5*   HCT 27.2*        CMP:   Recent Labs  Lab 08/21/17  1040      K 3.4*      CO2 22*   GLU 90   BUN 9   CREATININE 1.2   CALCIUM 8.2*   PROT 7.4   ALBUMIN 2.9*   BILITOT 1.6*   ALKPHOS 237*   AST 50*   ALT 11   ANIONGAP 16   EGFRNONAA 50*     Lactic Acid: No results for input(s): LACTATE in the last 48 hours.  Lipase: No results for input(s): LIPASE in the last 48 hours.  Lipid Panel: No results for input(s): CHOL, HDL, LDLCALC, TRIG, CHOLHDL in the last 48 hours.  Magnesium:   Recent Labs  Lab 08/21/17  1040   MG 1.0*     TSH:   Recent Labs  Lab 08/21/17  1040   TSH 4.320*       Significant Imaging:   Imaging Results    None           Assessment/Plan:     * Hypomagnesemia    Patient with chronic alcoholism, suspected source of low mag. She denies alcohol since July 21st, 2017. Was just hospitalized for pancreatitis her levels were noted to be normalizing at the time of discharge but are higher now. Magnesium level not checked during last hospitaliztion  -Replace mag with IV and  recheck in am  -Monitor potassium level and replenish with oral  -IV fluids for rehydration  -supportive care for NV  -Neurology consult        Acute pancreatitis    Reports mild epigastric abdominal pain; lipase 115 - likely chronic pancreatitis  Attempt   Clear liquids        Depression    Depression may be contributory to her excessive drinking and non-specific body aches.   Start Cymbalta           Essential hypertension    Restart home medication amlodipine  PRN hydralazine 10 mg SBP>180        Panniculitis    Completed her abx treatment; no fever or leukocytosis          VTE Risk Mitigation         Ordered     enoxaparin injection 40 mg  Daily     Route:  Subcutaneous        08/21/17 1448               PAIGE Braxton, FNP-C  PA# [id number]  NPI# [id number]  Hospitalist - Department of Hospital Medicine  [address] [place] [address]  Office [phone]; Pager [phone]

## 2017-08-21 NOTE — ED PROVIDER NOTES
Encounter Date: 8/21/2017    SCRIBE #1 NOTE: I, Aicha Aly, am scribing for, and in the presence of,  Alex Nguyen MD. I have scribed the following portions of the note - Other sections scribed: ROS, HPI.       History     Chief Complaint   Patient presents with    Extremity Weakness     Pt reports increased leg weakness over the course of several months. Pt reports she has also had increased falls related to the weakness.      CC: Numbness    HPI: Patient is a 57 y.o. F  has a past medical history of Back pain (chronic); Depression and Hypertension who presents to the ED via EMS for evaluation of an 8-month history of numbness to the bilateral feet and buttocks, and pain to the low back and bilateral knees. She also reports a non-pruritic rash to the low abdomen and intermittent headaches radiating from the R occipital head to the R ear, but denies a headache on exam. Patient was admitted here 8/17/17 for acute pancreatitis. Since being discharged, she reports falling twice at home. She has been unable to work the last 7 months. Patient has never been evaluated by a neurologist, and she has not been seen by her PCP for these symptoms. Patient denies fever, chills, shortness of breath, abdominal pain, emesis, and/or dysuria. No PMHx of DM, CHF, Lung disease, Thyroid disease, and/or HLD. Patient reports occasional alcohol use but denies tobacco use. Surgical history notable for hysterectomy and total hip arthroplasty.       The history is provided by the patient. No  was used.     Review of patient's allergies indicates:  No Known Allergies  Past Medical History:   Diagnosis Date    Depression     Encounter for blood transfusion     Hypertension      Past Surgical History:   Procedure Laterality Date    HYSTERECTOMY      TOTAL HIP ARTHROPLASTY       Family History   Problem Relation Age of Onset    Hypertension Mother     Hypertension Father     Heart disease Father     Hypertension  Sister     Heart disease Sister     Heart disease Maternal Grandmother     Hypertension Maternal Grandmother     Heart disease Maternal Grandfather     Hypertension Maternal Grandfather     Heart disease Paternal Grandmother     Hypertension Paternal Grandmother     Heart disease Paternal Grandfather     Hypertension Paternal Grandfather      Social History   Substance Use Topics    Smoking status: Former Smoker    Smokeless tobacco: Never Used    Alcohol use Yes      Comment: QOD last drink on 7/21/2017     Review of Systems   Constitutional: Negative for chills and fever.   HENT: Negative for sore throat.    Eyes: Negative for pain.   Respiratory: Negative for shortness of breath.    Cardiovascular: Negative for chest pain.   Gastrointestinal: Negative for abdominal pain and vomiting.   Genitourinary: Negative for dysuria.   Musculoskeletal: Positive for arthralgias (bilateral knee pain) and back pain (low back).   Skin: Positive for rash (non-pruritic rash to low abdomen).   Neurological: Positive for numbness (bilateral feet and buttocks) and headaches (R occipital radiating to R ear (resolved)).       Physical Exam     Initial Vitals [08/21/17 0926]   BP Pulse Resp Temp SpO2   (!) 142/92 100 18 98.2 °F (36.8 °C) 100 %      MAP       108.67         Physical Exam  The patient was examined specifically for the following:   General:No significant distress, Good color, Warm and dry. Head and neck:Scalp atraumatic, Neck supple. Neurological:Appropriate conversation, Gross motor deficits. Eyes:Conjugate gaze, Clear corneas. ENT: No epistaxis. Cardiac: Regular rate and rhythm, Grossly normal heart tones. Pulmonary: Wheezing, Rales. Gastrointestinal: Abdominal tenderness, Abdominal distention. Musculoskeletal: Extremity deformity, Apparent pain with range of motion of the joints. Skin: Rash.   The findings on examination were normal except for the following: Patient has decreased sensation in both feet.  Her  legs.  Be restless.  The patient has brisk dorsalis pedis pulses bilaterally.  The patient has good strength in lower extremities.  The patient is examined in the bed.  There is no tenderness erythema or warmth of the knees.  There is minimal bilateral low lumbar back tenderness.  Lungs are clear and free wheezing rales of the rhonchi.  Patient is morbidly obese.  The patient has an irregular tachycardia.  Heart tones are otherwise unremarkable.  Mental status examination, cranial nerves, motor and sensory examination are otherwise unremarkable.  ED Course   Procedures  Labs Reviewed   COMPREHENSIVE METABOLIC PANEL - Abnormal; Notable for the following:        Result Value    Potassium 3.4 (*)     CO2 22 (*)     Calcium 8.2 (*)     Albumin 2.9 (*)     Total Bilirubin 1.6 (*)     Alkaline Phosphatase 237 (*)     AST 50 (*)     eGFR if  57 (*)     eGFR if non  50 (*)     All other components within normal limits   CBC W/ AUTO DIFFERENTIAL - Abnormal; Notable for the following:     RBC 2.46 (*)     Hemoglobin 9.5 (*)     Hematocrit 27.2 (*)      (*)     MCH 38.6 (*)     RDW 17.5 (*)     Gran # 7.8 (*)     Mono # 1.7 (*)     All other components within normal limits   MAGNESIUM - Abnormal; Notable for the following:     Magnesium 1.0 (*)     All other components within normal limits   URINALYSIS - Abnormal; Notable for the following:     Appearance, UA Hazy (*)     Ketones, UA Trace (*)     Leukocytes, UA 1+ (*)     All other components within normal limits   TSH - Abnormal; Notable for the following:     TSH 4.320 (*)     All other components within normal limits   URINALYSIS MICROSCOPIC - Abnormal; Notable for the following:     WBC, UA 15 (*)     Yeast, UA Rare (*)     Hyaline Casts, UA 10 (*)     All other components within normal limits   LIPASE - Abnormal; Notable for the following:     Lipase 106 (*)     All other components within normal limits   CK   T4, FREE     EKG  Readings: (Independently Interpreted)   This patient is in atrial fibrillation with a heart rate of 87.  The patient does have complexes with P waves.  The atrial fibrillation is intermittent.  The patient has diffuse T-wave flattening.  There are nonspecific ST segment and T-wave changes.  There is a prolonged QT T interval.  The patient has low voltage QRS complexes.  There is no definite evidence of acute myocardial infarction or malignant arrhythmia.       Medical decision-making: Given the above Dr. Barrera saw the patient in the emergency room and felt that an EMG might be ordered.  He believes that his symptoms are consistent with peripheral neuropathy.  At the same time we discovered a magnesium of 1.0.  I discussed this case with Dr. Mao.  Who recommends observation and replacement of the magnesium and repeat evaluation.  I will admit the patient to the observation service.  I spoke with nurse practitioner Elizabeth.  She will see the patient on the floor.  I will write admission orders.  We doubt stroke.  We believe that this patient is deconditioned.  She is morbidly obese and spending time in bed.                Scribe Attestation:   Scribe #1: I performed the above scribed service and the documentation accurately describes the services I performed. I attest to the accuracy of the note.    Attending Attestation:           Physician Attestation for Scribe:  Physician Attestation Statement for Scribe #1: I, Alex Nguyen MD, reviewed documentation, as scribed by Aicha lAy in my presence, and it is both accurate and complete.                 ED Course     Clinical Impression:   The primary encounter diagnosis was Physical deconditioning. Diagnoses of Hypomagnesemia, Numbness and tingling of both feet, and Essential hypertension were also pertinent to this visit.                           Alex Nguyen MD  08/25/17 2374

## 2017-08-21 NOTE — ED TRIAGE NOTES
Arrived via EMS. Complains of BLE weakness. States it started after she was discharged from hospital on Friday. AAOx3. Daughter states pt uses walker at home to ambulate but has been too weak lately to use it.

## 2017-08-22 VITALS
DIASTOLIC BLOOD PRESSURE: 55 MMHG | RESPIRATION RATE: 18 BRPM | HEART RATE: 95 BPM | HEIGHT: 62 IN | OXYGEN SATURATION: 96 % | SYSTOLIC BLOOD PRESSURE: 106 MMHG | WEIGHT: 168.63 LBS | BODY MASS INDEX: 31.03 KG/M2 | TEMPERATURE: 99 F

## 2017-08-22 LAB
ALBUMIN SERPL BCP-MCNC: 2.7 G/DL
ALP SERPL-CCNC: 214 U/L
ALT SERPL W/O P-5'-P-CCNC: 14 U/L
ANION GAP SERPL CALC-SCNC: 13 MMOL/L
AST SERPL-CCNC: 48 U/L
BILIRUB SERPL-MCNC: 1.2 MG/DL
BUN SERPL-MCNC: 11 MG/DL
CALCIUM SERPL-MCNC: 7.7 MG/DL
CHLORIDE SERPL-SCNC: 105 MMOL/L
CO2 SERPL-SCNC: 22 MMOL/L
CREAT SERPL-MCNC: 0.9 MG/DL
EST. GFR  (AFRICAN AMERICAN): >60 ML/MIN/1.73 M^2
EST. GFR  (NON AFRICAN AMERICAN): >60 ML/MIN/1.73 M^2
GLUCOSE SERPL-MCNC: 79 MG/DL
LIPASE SERPL-CCNC: 81 U/L
MAGNESIUM SERPL-MCNC: 2.4 MG/DL
POTASSIUM SERPL-SCNC: 3.1 MMOL/L
PROT SERPL-MCNC: 6.5 G/DL
SODIUM SERPL-SCNC: 140 MMOL/L

## 2017-08-22 PROCEDURE — 36415 COLL VENOUS BLD VENIPUNCTURE: CPT

## 2017-08-22 PROCEDURE — 83735 ASSAY OF MAGNESIUM: CPT

## 2017-08-22 PROCEDURE — 96366 THER/PROPH/DIAG IV INF ADDON: CPT

## 2017-08-22 PROCEDURE — 25000003 PHARM REV CODE 250: Performed by: NURSE PRACTITIONER

## 2017-08-22 PROCEDURE — 96368 THER/DIAG CONCURRENT INF: CPT

## 2017-08-22 PROCEDURE — G0378 HOSPITAL OBSERVATION PER HR: HCPCS

## 2017-08-22 PROCEDURE — 25000003 PHARM REV CODE 250: Performed by: EMERGENCY MEDICINE

## 2017-08-22 PROCEDURE — G8979 MOBILITY GOAL STATUS: HCPCS | Mod: CL

## 2017-08-22 PROCEDURE — 99213 OFFICE O/P EST LOW 20 MIN: CPT | Mod: ,,, | Performed by: PSYCHIATRY & NEUROLOGY

## 2017-08-22 PROCEDURE — 97161 PT EVAL LOW COMPLEX 20 MIN: CPT

## 2017-08-22 PROCEDURE — G8980 MOBILITY D/C STATUS: HCPCS | Mod: CM

## 2017-08-22 PROCEDURE — 96365 THER/PROPH/DIAG IV INF INIT: CPT

## 2017-08-22 PROCEDURE — G8978 MOBILITY CURRENT STATUS: HCPCS | Mod: CM

## 2017-08-22 PROCEDURE — 83690 ASSAY OF LIPASE: CPT

## 2017-08-22 PROCEDURE — 80053 COMPREHEN METABOLIC PANEL: CPT

## 2017-08-22 RX ORDER — LANOLIN ALCOHOL/MO/W.PET/CERES
100 CREAM (GRAM) TOPICAL DAILY
COMMUNITY
Start: 2017-08-22 | End: 2019-04-22

## 2017-08-22 RX ORDER — FOLIC ACID 1 MG/1
2 TABLET ORAL DAILY
Qty: 30 TABLET | Refills: 3 | Status: SHIPPED | OUTPATIENT
Start: 2017-08-22 | End: 2018-01-23

## 2017-08-22 RX ORDER — POTASSIUM CHLORIDE 20 MEQ/1
40 TABLET, EXTENDED RELEASE ORAL ONCE
Status: COMPLETED | OUTPATIENT
Start: 2017-08-22 | End: 2017-08-22

## 2017-08-22 RX ORDER — GABAPENTIN 300 MG/1
300 CAPSULE ORAL 3 TIMES DAILY
Status: DISCONTINUED | OUTPATIENT
Start: 2017-08-22 | End: 2017-08-22 | Stop reason: HOSPADM

## 2017-08-22 RX ORDER — GABAPENTIN 300 MG/1
300 CAPSULE ORAL 3 TIMES DAILY
Qty: 90 CAPSULE | Refills: 1 | Status: SHIPPED | OUTPATIENT
Start: 2017-08-22 | End: 2018-08-22

## 2017-08-22 RX ADMIN — MICONAZOLE NITRATE: 20 OINTMENT TOPICAL at 08:08

## 2017-08-22 RX ADMIN — HYDROCODONE BITARTRATE AND ACETAMINOPHEN 1 TABLET: 5; 325 TABLET ORAL at 05:08

## 2017-08-22 RX ADMIN — ESCITALOPRAM OXALATE 20 MG: 10 TABLET ORAL at 08:08

## 2017-08-22 RX ADMIN — DULOXETINE HYDROCHLORIDE 20 MG: 20 CAPSULE, DELAYED RELEASE ORAL at 08:08

## 2017-08-22 RX ADMIN — THIAMINE HCL TAB 100 MG 100 MG: 100 TAB at 08:08

## 2017-08-22 RX ADMIN — GABAPENTIN 300 MG: 300 CAPSULE ORAL at 01:08

## 2017-08-22 RX ADMIN — AMLODIPINE BESYLATE 10 MG: 5 TABLET ORAL at 08:08

## 2017-08-22 RX ADMIN — POTASSIUM CHLORIDE 40 MEQ: 1500 TABLET, EXTENDED RELEASE ORAL at 01:08

## 2017-08-22 RX ADMIN — FOLIC ACID 2 MG: 1 TABLET ORAL at 08:08

## 2017-08-22 RX ADMIN — POLYETHYLENE GLYCOL 3350 17 G: 17 POWDER, FOR SOLUTION ORAL at 08:08

## 2017-08-22 NOTE — PLAN OF CARE
08/22/17 1510   Final Note   Assessment Type Final Discharge Note   Discharge Disposition Home   Hospital Follow Up  Appt(s) scheduled? (pt to do herself so that transportation can be arranged)   Discharge plans and expectations educations in teach back method with documentation complete? Yes   Right Care Referral Info   Post Acute Recommendation No Care

## 2017-08-22 NOTE — NURSING
Report received from BIANCA Mendes. Patient care assumed. Pt. Observed lying in bed with telemetry monitor in place. Pain noted to BLE but no signs of respiratory distress noted. Pt. Stable. Will continue to monitor.

## 2017-08-22 NOTE — PT/OT/SLP EVAL
Physical Therapy  Evaluation    Kylie Aleman   MRN: [id number]   Admitting Diagnosis: Hypomagnesemia    PT Received On: 08/22/17  PT Start Time: 1504     PT Stop Time: 1524    PT Total Time (min): 20 min       Billable Minutes:  Evaluation  20     Diagnosis: Hypomagnesemia  Fall at home prior to coming into the hospital    Past Medical History:   Diagnosis Date    Depression     Encounter for blood transfusion     Hypertension       Past Surgical History:   Procedure Laterality Date    HYSTERECTOMY      TOTAL HIP ARTHROPLASTY       Referring physician: MARINA Carrera   Date referred to PT: 8/22/17    General Precautions: Standard, fall  Orthopedic Precautions: N/A   Braces: N/A       Patient History:  Lives With: spouse, child(loren), adult (son works during the day )  Living Arrangements: house  Home Accessibility:  (no concern)  Living Environment Comment: Patient had 2 falls prior to coming into the hospital. One fall took place when her son was trying to lift her into the wheelchair and she slide down onto the floor. She has not walked in ~2 weeks. Patient used to work at [employer] but has been on leave due to chronic back pain. Her legs have been numb and painful ~1 year.  Equipment Currently Used at Home: walker, rolling (wheelchair is borrowed from her parent)    Previous Level of Function:  Ambulation Skills: unable to perform  Transfer Skills: unable to perform (son has been lifting patient up into the wheelchair since last admission)    Subjective:  Communicated with nurse Cinthya VALDES prior to session.  Patient agreeable to participate in PT evaluation.   Chief Complaint: Pain and numbness in her B LE's.   Patient goals: To walk.     Pain/Comfort  Pain Rating 1: 8/10  Location - Side 1: Bilateral  Location 1: leg  Pain Addressed 1: Distraction, Cessation of Activity, Nurse notified  Pain Rating Post-Intervention 1: 8/10    Objective:   Patient found with: peripheral IV, telemetry     Cognitive Exam:  Oriented to:  Person, Place and Situation    Follows Commands/attention: Follows one-step commands  Communication: clear/fluent  Safety awareness/insight to disability: impaired    Physical Exam:  Postural examination/scapula alignment: Rounded shoulder and Head forward    Skin integrity: Visible skin intact  Edema: None noted     Sensation: Impaired  B LE's    Upper Extremity Range of Motion:  Right Upper Extremity: WFL  Left Upper Extremity: WFL    Upper Extremity Strength:  Right Upper Extremity: 4/5  Left Upper Extremity: 4/5    Lower Extremity Range of Motion:  Right Lower Extremity: WFL  Left Lower Extremity: WFL    Lower Extremity Strength:  Right Lower Extremity: 4/5  Left Lower Extremity: 4/5    Gross motor coordination: impaired with tremors to B UE and LE's    Functional Mobility:  Bed Mobility:  Scooting/Bridging: Dependent, With assist of 2 (drawsheet)  Supine to Sit: Maximum Assistance  Sit to Supine: Maximum Assistance    Transfers:  Sit <> Stand Assistance:  (patient attempted to stand x2 trials but was unable to perform due to pain )    Gait:   Gait Distance:  (unable to perform due to pain )    Balance:   Static Sit: FAIR-: Maintains without assist but inconsistent   Dynamic Sit: POOR: N/A  Static Stand: unable to perform     AM-PAC 6 CLICK MOBILITY  How much help from another person does this patient currently need?   1 = Unable, Total/Dependent Assistance  2 = A lot, Maximum/Moderate Assistance  3 = A little, Minimum/Contact Guard/Supervision  4 = None, Modified [place]/Independent    Turning over in bed (including adjusting bedclothes, sheets and blankets)?: 2  Sitting down on and standing up from a chair with arms (e.g., wheelchair, bedside commode, etc.): 1  Moving from lying on back to sitting on the side of the bed?: 2  Moving to and from a bed to a chair (including a wheelchair)?: 1  Need to walk in hospital room?: 1  Climbing 3-5 steps with a railing?: 1  Total Score: 8     AM-PAC Raw Score CMS  G-Code Modifier Level of Impairment Assistance   6 % Total / Unable   7 - 9 CM 80 - 100% Maximal Assist   10 - 14 CL 60 - 80% Moderate Assist   15 - 19 CK 40 - 60% Moderate Assist   20 - 22 CJ 20 - 40% Minimal Assist   23 CI 1-20% SBA / CGA   24 CH 0% Independent/ Mod I     Patient left supine with all lines intact, call button in reach and nurse Cinthya notified.    Assessment:   Kyile Aleman is a 57 y.o. female with a medical diagnosis of Hypomagnesemia and presents with increased pain and numbness to B LE's. She has not walked in ~2 weeks due to pain and numbness. Her son has been lifting her up and putting her into the wheelchair at home. She would benefit from inpatient rehab at discharge in order to get patient back to functional ambulation. She would continue to benefit from PT while in the hospital in order to address deficits listed below and get patient back to PLOF.    Rehab identified problem list/impairments: Rehab identified problem list/impairments: weakness, impaired self care skills, impaired functional mobilty, gait instability, impaired balance, decreased lower extremity function, decreased safety awareness, pain    Rehab potential is fair.    Activity tolerance: Poor due to pain and numbness to B LE's    Discharge recommendations: Discharge Facility/Level Of Care Needs: home health PT     Barriers to discharge: Barriers to Discharge: Other (Comment) (unable to ambulate)    Equipment recommendations: Equipment Needed After Discharge: bedside commode, hospital bed, lift device     GOALS:    Physical Therapy Goals        Problem: Physical Therapy Goal    Goal Priority Disciplines Outcome Goal Variances Interventions   Physical Therapy Goal     PT/OT, PT      Description:  Goals to be met by: 17     Patient will increase functional independence with mobility by performin. Supine to sit with Stand-by Assistance  2. Sit to stand transfer with Minimal Assistance with rolling  walk  3. Bed to chair transfer assess when able  4. Gait assess when able  5. Lower extremity exercise program x30 reps per handout, with supervision                  PLAN:    Patient to be seen daily to address the above listed problems via gait training, therapeutic activities, therapeutic exercises, wheelchair management/training  Plan of Care expires: 09/05/17  Plan of Care reviewed with: patient    Functional Assessment Tool Used: AM PAC   Score: 8  Functional Limitation: Mobility: Walking and moving around  Mobility: Walking and Moving Around Current Status (): CM  Mobility: Walking and Moving Around Goal Status (): CL     Leelee Bullard, PT, MOT  08/22/2017

## 2017-08-22 NOTE — NURSING
Bladder scan completed 227 ml.  JEANNA Carrera NP notified.  Okay to send patient home.         Discharge instructions given to patient at bedside. Patient verbalized understanding of instructions. Patient states willingness to comply. Saline lock removed. Tele monitoring removed.    Transport at bedside to escort to family vehicle.

## 2017-08-22 NOTE — PROGRESS NOTES
[facility]  Neurology  Progress Note    Patient Name: Kylie Aleman  MRN: [id number]  Admission Date: 8/21/2017  Hospital Length of Stay: 0 days  Code Status: Full Code   Attending Provider: Yonny Tatum MD  Primary Care Physician: Lulu Mcnamara MD   Principal Problem:Hypomagnesemia    Subjective:     Interval History: 58 y/o female with medical Hx as listed below comes to ED for evaluation of numbness and pain in feet. Pt states that actually symptoms are not new and have been occurring for at least two months. Ms. Aleman reports numbness and tingling in feet to level of ankles as well as burning pain. She also has a Hx of arthritis and experiences pain in knees and hips for which is mostly sedentary at home. Denies weakness in UE's, vertigo, speech or visual disturbances. No bowel/bladder incontinence.    -8/22/17: No new issues. Numbness and tingling in feet.    Current Neurological Medications:     Current Facility-Administered Medications   Medication Dose Route Frequency Provider Last Rate Last Dose    amlodipine tablet 10 mg  10 mg Oral Daily Alex Nguyen MD   10 mg at 08/22/17 0835    diazePAM tablet 2 mg  2 mg Oral Q6H PRN ANGELA Braxton        duloxetine DR capsule 20 mg  20 mg Oral BID ANGELA Braxton   20 mg at 08/22/17 0835    enoxaparin injection 40 mg  40 mg Subcutaneous Daily BABAR BraxtonP   40 mg at 08/21/17 1605    escitalopram oxalate tablet 20 mg  20 mg Oral Daily Alex Nguyen MD   20 mg at 08/22/17 0835    folic acid tablet 2 mg  2 mg Oral Daily BABAR BraxtonP   2 mg at 08/22/17 0836    gabapentin capsule 300 mg  300 mg Oral TID ANGELA Mejia        hydrALAZINE injection 10 mg  10 mg Intravenous Q8H PRN ANGELA Braxton        hydrocodone-acetaminophen 5-325mg per tablet 1 tablet  1 tablet Oral Q4H PRN Alex Nguyen MD   1 tablet at 08/22/17 0533    miconazole nitrate 2% ointment   Topical (Top) BID Alex  ANGIE Nguyen MD        ondansetron injection 4 mg  4 mg Intravenous Q12H PRN Alex Nguyen MD        polyethylene glycol packet 17 g  17 g Oral Daily Alex Nguyen MD   17 g at 08/22/17 0836    ramelteon tablet 8 mg  8 mg Oral Nightly PRN Alex Nguyen MD        thiamine tablet 100 mg  100 mg Oral Daily Beatris DRIVERMarquise Johnson FNP   100 mg at 08/22/17 0835       Review of Systems   Constitutional: Negative for fever and unexpected weight change.   HENT: Negative for trouble swallowing and voice change.    Eyes: Negative for photophobia.   Respiratory: Negative for chest tightness and shortness of breath.    Cardiovascular: Negative for chest pain and palpitations.   Gastrointestinal: Negative for abdominal pain.   Genitourinary: Negative for dysuria.   Musculoskeletal: Positive for arthralgias and back pain.   Neurological: Negative for dizziness, tremors, facial asymmetry, speech difficulty, weakness, light-headedness, numbness and headaches.     Objective:     Vital Signs (Most Recent):  Temp: 97.4 °F (36.3 °C) (08/22/17 0725)  Pulse: 95 (08/22/17 0725)  Resp: 18 (08/22/17 0725)  BP: 119/77 (08/22/17 0725)  SpO2: 97 % (08/22/17 0725) Vital Signs (24h Range):  Temp:  [97.4 °F (36.3 °C)-98.7 °F (37.1 °C)] 97.4 °F (36.3 °C)  Pulse:  [] 95  Resp:  [18-19] 18  SpO2:  [90 %-100 %] 97 %  BP: ()/() 119/77     Weight: 76.5 kg (168 lb 9.6 oz)  Body mass index is 30.84 kg/m².    Physical Exam  Constitutional: well-developed  Head: normocephalic.  Eyes: conjunctivae/corneas clear  Nose: no discharge, no epistaxis  Heart: regular rate and rhythm.  Abdomen: depressible; no pain to palpation  Extremities: no cyanosis; no edema  Neurologic: Mental status: alert; oriented to person, place, time  Cranial nerves: field deficit right temporal/left nasal; pupils are round at 3 mm and reactive to light; EOMI, no nystagmus; no facial asymmetry; protrudes tongue midline; SCM/tap 5/5  Strength: 5/5 in UE's; LE's;  no drift  DTR's: 1+ in UE; 0 in LE's  Decreased vibration from mid lower leg down; decreased vibration in feet. Intact pinprick.         Significant Labs:   CBC:   Recent Labs  Lab 08/21/17  1040   WBC 12.11   HGB 9.5*   HCT 27.2*        CMP:   Recent Labs  Lab 08/21/17  1040 08/21/17  1552 08/22/17  0657   GLU 90  --  79     --  140   K 3.4*  --  3.1*     --  105   CO2 22*  --  22*   BUN 9  --  11   CREATININE 1.2  --  0.9   CALCIUM 8.2*  --  7.7*   MG 1.0* 1.0* 2.4   PROT 7.4  --  6.5   ALBUMIN 2.9*  --  2.7*   BILITOT 1.6*  --  1.2*   ALKPHOS 237*  --  214*   AST 50*  --  48*   ALT 11  --  14   ANIONGAP 16  --  13   EGFRNONAA 50*  --  >60         Assessment and Plan:     58 y/o female numbness and tingling if distal lower limbs     1. Paresthesias: Hx and exam suggests a peripheral neuropathy given symmetry of symptoms and findings in distal lower extremities.                     -Pt needs outpatient EMG/NCS.                     -Vit B12, TSH. Possible SPEP depending on NCS results.                     -Trial of gabapentin 300 mg three times daily. Pt tolerating medication.    Active Diagnoses:    Diagnosis Date Noted POA    PRINCIPAL PROBLEM:  Hypomagnesemia [E83.42] 08/21/2017 Yes    Acute pancreatitis [K85.90] 08/16/2017 Yes    Essential hypertension [I10] 08/10/2017 Yes    Depression [F32.9] 08/10/2017 Yes    Panniculitis [M79.3] 08/09/2017 Yes      Problems Resolved During this Admission:    Diagnosis Date Noted Date Resolved POA       VTE Risk Mitigation         Ordered     Medium Risk of VTE  Once      08/21/17 1505     enoxaparin injection 40 mg  Daily     Route:  Subcutaneous        08/21/17 1937          Saúl Barrera MD  Neurology  [facility]

## 2017-08-22 NOTE — PLAN OF CARE
Problem: Fall Risk (Adult)  Goal: Identify Related Risk Factors and Signs and Symptoms  Related risk factors and signs and symptoms are identified upon initiation of Human Response Clinical Practice Guideline (CPG)    08/22/17 0222   Fall Risk   Related Risk Factors (Fall Risk) gait/mobility problems;history of falls;environment unfamiliar   Signs and Symptoms (Fall Risk) presence of risk factors       Problem: Patient Care Overview  Goal: Plan of Care Review   08/22/17 0222   Coping/Psychosocial   Plan Of Care Reviewed With patient       Problem: Pain, Acute (Adult)  Intervention: Monitor/Manage Analgesia   08/22/17 0222   Manage Acute Burn Pain   Bowel Intervention commode/bedpan at bedside   Safety Interventions   Medication Review/Management medications reviewed     Intervention: Support/Optimize Psychosocial Response to Acute Pain   08/22/17 0222   Coping/Psychosocial Interventions   Supportive Measures verbalization of feelings encouraged   Psychosocial Support   Diversional Activities smartphone;[employer]   Trust Relationship/Rapport care explained       Goal: Identify Related Risk Factors and Signs and Symptoms  Related risk factors and signs and symptoms are identified upon initiation of Human Response Clinical Practice Guideline (CPG)    08/22/17 0222   Pain, Acute   Related Risk Factors (Acute Pain) disease process;persistent pain   Signs and Symptoms (Acute Pain) verbalization of pain descriptors     Goal: Acceptable Pain Control/Comfort Level  Patient will demonstrate the desired outcomes by discharge/transition of care.    08/22/17 0222   Pain, Acute (Adult)   Acceptable Pain Control/Comfort Level making progress toward outcome       Patient remained free from any falls. Assissted with using the bedpan when needing it. Patient still with complaints of BLE pain in which new medications were started and patient was provided with prn pain medication when needed. Patient stable. Will continue to monitor.

## 2017-08-22 NOTE — PLAN OF CARE
08/22/17 1020   Discharge Assessment   Assessment Type Discharge Planning Assessment   Confirmed/corrected address and phone number on facesheet? Yes   Assessment information obtained from? Patient   Prior to hospitilization cognitive status: Alert/Oriented   Prior to hospitalization functional status: Assistive Equipment;Independent   Current cognitive status: Alert/Oriented   Current Functional Status: Independent;Assistive Equipment   Lives With spouse;child(loren), adult   Able to Return to Prior Arrangements yes   Is patient able to care for self after discharge? Yes   Patient's perception of discharge disposition home or selfcare   Readmission Within The Last 30 Days other (see comments)  (yes previous admission for same dx.)   Patient currently being followed by outpatient case management? No   Patient currently receives any other outside agency services? No   Equipment Currently Used at Home walker, rolling;rollator;bedside commode;shower chair   Do you have any problems affording any of your prescribed medications? No   Is the patient taking medications as prescribed? (TBD)   Does the patient have transportation home? Yes   Transportation Available family or friend will provide;Medicaid transportation   Does the patient receive services at the [facility]? No   Discharge Plan A Home with family   Discharge Plan B Home with family   Patient/Family In Agreement With Plan yes   Readmission Questionnaire   Have you felt down, depressed, or hopeless? 0   Have you felt little interest or pleasure in doing things? 0

## 2017-08-22 NOTE — PLAN OF CARE
Problem: Physical Therapy Goal  Goal: Physical Therapy Goal  Goals to be met by: 17     Patient will increase functional independence with mobility by performin. Supine to sit with Stand-by Assistance  2. Sit to stand transfer with Minimal Assistance with rolling walk  3. Bed to chair transfer assess when able  4. Gait assess when able  5. Lower extremity exercise program x30 reps per handout, with supervision    Patient unable to stand due to pain in B LE's. Patient would benefit from inpatient rehab discharge. If patient discharges home she will need wallace lift, hospital bed,  PT, and 24 hour assistance at discharge.

## 2017-08-23 LAB
BACTERIA UR CULT: NORMAL
ETHYL GLUCURONIDE: NEGATIVE NG/ML
VIT B1 SERPL-MCNC: 19 UG/L (ref 38–122)

## 2017-08-28 NOTE — DISCHARGE SUMMARY
"[facility] Hospital Medicine  Discharge Summary      Patient Name: Kylie Aleman  MRN: [id number]  Admission Date: 8/21/2017  Hospital Length of Stay: 0 days  Discharge Date and Time:  08/28/2017 4:36 PM  Attending Physician: Naomi att. providers found   Discharging Provider: ANGELA Nino  Primary Care Provider: Lulu Mcnamara MD      HPI:   Kylie Aleman is a 57 y.o. female with medical history Depression, Chronic lumbar back pain, and Hypertension. Patient presented for evaluation after an acute fall this morning. She reports that her son was trying to place in her wheelchair but it wasn't lock and she fell, but she states that she didn't fall she just sat down. She reports that she has been feeling numbness in lower extremities. She was recently laid off from [employer] due to her chronic back pain and tells me she has been sorely depressed which is why "I don't really have an appetite".    Patient was found to have mild dehydration, elevated lipase higher than last admit 106 and multiple electrolyte imbalances specifically low magnesium of 1.96.    Of Note: Recently DCed on 8/18/17 after hospitalization for NV    * No surgery found *      Indwelling Lines/Drains at time of discharge:   Lines/Drains/Airways          No matching active lines, drains, or airways        Hospital Course:   Patient admitted for evaluation of mechanical fall 2/2 numbness and tingling in feet.  Neurology consulted and evaluated and notes "paresthesias: exam suggests a peripheral neuropathy given symmetry of symptoms and findings in distal lower extremities. Recommendations for outpatient EMG/NCS per neurology and trail of gabapentin 300 mg three times daily. Additionally, found with hypomagnesium, repleted with supplement. Patient is chronic alcoholism which can be culprit. She is stable and will discharge to home with instructions to follow up with PCP and neurology in 1 week. Instructed to resume current " medication regimen. Activity as tolerated. Instructed on the importance of alcohol cessation.            Consults:   Consults         Status Ordering Provider     Inpatient consult to Neurology  Once     Provider:  Saúl Barrera MD    Completed ABHILASH STEARNS          Significant Diagnostic Studies: Labs: All labs within the past 24 hours have been reviewed    Pending Diagnostic Studies:     None        Final Active Diagnoses:    Diagnosis Date Noted POA    PRINCIPAL PROBLEM:  Hypomagnesemia [E83.42] 08/21/2017 Yes    Acute pancreatitis [K85.90] 08/16/2017 Yes    Essential hypertension [I10] 08/10/2017 Yes    Depression [F32.9] 08/10/2017 Yes    Panniculitis [M79.3] 08/09/2017 Yes      Problems Resolved During this Admission:    Diagnosis Date Noted Date Resolved POA      * Hypomagnesemia    Patient with chronic alcoholism, suspected source of low mag. She denies alcohol since July 21st, 2017. Was just hospitalized for pancreatitis her levels were noted to be normalizing at the time of discharge but are higher now. Magnesium level not checked during last hospitaliztion  -Replace mag with IV and recheck in am  -Monitor potassium level and replenish with oral  -IV fluids for rehydration  -supportive care for NV  -Neurology consult            Discharged Condition: good    Disposition: Home or Self Care    Follow Up:  Follow-up Information     Lulu Mcnamara MD.    Specialty:  Internal Medicine  Contact information:  [address] [place] [address]  [place] [address] [phone]             [facility].    Why:  Please call to schedule follow up appointment at time of discharge.  Contact information:  [address]  [place] [address] [phone]                 Patient Instructions:     Diet general   Order Specific Question Answer Comments   Na restriction, if any: 2gNa      Activity as tolerated     Call MD for:  persistent dizziness, light-headedness, or visual disturbances      Call MD for:  increased confusion or weakness     Call MD for:  severe persistent headache     Call MD for:  difficulty breathing or increased cough     Call MD for:  severe uncontrolled pain     Call MD for:  persistent nausea and vomiting or diarrhea       Medications:  Reconciled Home Medications:   Discharge Medication List as of 8/22/2017  5:43 PM      START taking these medications    Details   folic acid (FOLVITE) 1 MG tablet Take 2 tablets (2 mg total) by mouth once daily., Starting Tue 8/22/2017, Until Wed 8/22/2018, Normal      gabapentin (NEURONTIN) 300 MG capsule Take 1 capsule (300 mg total) by mouth 3 (three) times daily., Starting Tue 8/22/2017, Until Wed 8/22/2018, Normal      thiamine 100 MG tablet Take 1 tablet (100 mg total) by mouth once daily., Starting Tue 8/22/2017, OTC         CONTINUE these medications which have NOT CHANGED    Details   amlodipine (NORVASC) 10 MG tablet Take 10 mg by mouth once daily., Historical Med      escitalopram oxalate (LEXAPRO) 20 MG tablet Take 20 mg by mouth once daily., Historical Med      miconazole nitrate 2% (MICOTIN) 2 % Oint Apply topically 2 (two) times daily., Starting Sat 8/12/2017, Print           Time spent on the discharge of patient: 30 minutes    HOS POC IP DISCHARGE SUMMARY    ANGELA Nino  Department of Hospital Medicine  [facility]

## 2017-08-28 NOTE — HOSPITAL COURSE
"Patient admitted for evaluation of mechanical fall 2/2 numbness and tingling in feet.  Neurology consulted and evaluated and notes "paresthesias: exam suggests a peripheral neuropathy given symmetry of symptoms and findings in distal lower extremities. Recommendations for outpatient EMG/NCS per neurology and trail of gabapentin 300 mg three times daily. Additionally, found with hypomagnesium, repleted with supplement. Patient is chronic alcoholism which can be culprit. She is stable and will discharge to home with instructions to follow up with PCP and neurology in 1 week. Instructed to resume current medication regimen. Activity as tolerated. Instructed on the importance of alcohol cessation.         "

## 2017-10-14 ENCOUNTER — HOSPITAL ENCOUNTER (EMERGENCY)
Facility: HOSPITAL | Age: 57
Discharge: HOME | End: 2017-10-14
Attending: EMERGENCY MEDICINE
Payer: MEDICAID

## 2017-10-14 VITALS
WEIGHT: 175 LBS | RESPIRATION RATE: 16 BRPM | SYSTOLIC BLOOD PRESSURE: 123 MMHG | TEMPERATURE: 98 F | OXYGEN SATURATION: 97 % | HEART RATE: 86 BPM | HEIGHT: 62 IN | DIASTOLIC BLOOD PRESSURE: 72 MMHG | BODY MASS INDEX: 32.2 KG/M2

## 2017-10-14 DIAGNOSIS — K60.0 ACUTE ANAL FISSURE: ICD-10-CM

## 2017-10-14 DIAGNOSIS — R60.0 PEDAL EDEMA: Primary | ICD-10-CM

## 2017-10-14 DIAGNOSIS — M79.672 FOOT PAIN, BILATERAL: ICD-10-CM

## 2017-10-14 DIAGNOSIS — M79.671 FOOT PAIN, BILATERAL: ICD-10-CM

## 2017-10-14 DIAGNOSIS — R07.89 CHEST PAIN, NON-CARDIAC: ICD-10-CM

## 2017-10-14 LAB
ALBUMIN SERPL BCP-MCNC: 2.8 G/DL
ALP SERPL-CCNC: 106 U/L
ALT SERPL W/O P-5'-P-CCNC: 9 U/L
ANION GAP SERPL CALC-SCNC: 11 MMOL/L
AST SERPL-CCNC: 26 U/L
BASOPHILS # BLD AUTO: 0.07 K/UL
BASOPHILS NFR BLD: 1 %
BILIRUB SERPL-MCNC: 0.8 MG/DL
BUN SERPL-MCNC: 11 MG/DL
CALCIUM SERPL-MCNC: 9.6 MG/DL
CHLORIDE SERPL-SCNC: 103 MMOL/L
CO2 SERPL-SCNC: 23 MMOL/L
CREAT SERPL-MCNC: 1 MG/DL
DIFFERENTIAL METHOD: ABNORMAL
EOSINOPHIL # BLD AUTO: 0.3 K/UL
EOSINOPHIL NFR BLD: 3.6 %
ERYTHROCYTE [DISTWIDTH] IN BLOOD BY AUTOMATED COUNT: 13.3 %
EST. GFR  (AFRICAN AMERICAN): >60 ML/MIN/1.73 M^2
EST. GFR  (NON AFRICAN AMERICAN): >60 ML/MIN/1.73 M^2
GLUCOSE SERPL-MCNC: 85 MG/DL
HCT VFR BLD AUTO: 30.8 %
HGB BLD-MCNC: 10.2 G/DL
LYMPHOCYTES # BLD AUTO: 3.8 K/UL
LYMPHOCYTES NFR BLD: 52.8 %
MAGNESIUM SERPL-MCNC: 1.7 MG/DL
MCH RBC QN AUTO: 32.3 PG
MCHC RBC AUTO-ENTMCNC: 33.1 G/DL
MCV RBC AUTO: 98 FL
MONOCYTES # BLD AUTO: 0.9 K/UL
MONOCYTES NFR BLD: 12.2 %
NEUTROPHILS # BLD AUTO: 2.2 K/UL
NEUTROPHILS NFR BLD: 30.4 %
PLATELET # BLD AUTO: 204 K/UL
PMV BLD AUTO: 9.8 FL
POTASSIUM SERPL-SCNC: 3.5 MMOL/L
PROT SERPL-MCNC: 7.3 G/DL
RBC # BLD AUTO: 3.16 M/UL
SODIUM SERPL-SCNC: 137 MMOL/L
TROPONIN I SERPL DL<=0.01 NG/ML-MCNC: 0.01 NG/ML
WBC # BLD AUTO: 7.2 K/UL

## 2017-10-14 PROCEDURE — 80053 COMPREHEN METABOLIC PANEL: CPT

## 2017-10-14 PROCEDURE — 83735 ASSAY OF MAGNESIUM: CPT

## 2017-10-14 PROCEDURE — 93005 ELECTROCARDIOGRAM TRACING: CPT

## 2017-10-14 PROCEDURE — 85025 COMPLETE CBC W/AUTO DIFF WBC: CPT

## 2017-10-14 PROCEDURE — 25000003 PHARM REV CODE 250: Performed by: EMERGENCY MEDICINE

## 2017-10-14 PROCEDURE — 84484 ASSAY OF TROPONIN QUANT: CPT

## 2017-10-14 PROCEDURE — 93010 ELECTROCARDIOGRAM REPORT: CPT | Mod: ,,, | Performed by: INTERNAL MEDICINE

## 2017-10-14 PROCEDURE — 99284 EMERGENCY DEPT VISIT MOD MDM: CPT

## 2017-10-14 RX ORDER — HYDROCODONE BITARTRATE AND ACETAMINOPHEN 5; 325 MG/1; MG/1
2 TABLET ORAL
Status: COMPLETED | OUTPATIENT
Start: 2017-10-14 | End: 2017-10-14

## 2017-10-14 RX ORDER — HYDROCORTISONE ACETATE 25 MG/1
25 SUPPOSITORY RECTAL 2 TIMES DAILY
Qty: 20 SUPPOSITORY | Refills: 0 | Status: SHIPPED | OUTPATIENT
Start: 2017-10-14 | End: 2017-10-24

## 2017-10-14 RX ORDER — ONDANSETRON 4 MG/1
4 TABLET, ORALLY DISINTEGRATING ORAL
Status: COMPLETED | OUTPATIENT
Start: 2017-10-14 | End: 2017-10-14

## 2017-10-14 RX ORDER — METHOCARBAMOL 500 MG/1
500 TABLET, FILM COATED ORAL 4 TIMES DAILY
COMMUNITY
End: 2017-11-13 | Stop reason: ALTCHOICE

## 2017-10-14 RX ORDER — IBUPROFEN 600 MG/1
600 TABLET ORAL 3 TIMES DAILY
COMMUNITY
End: 2017-11-13 | Stop reason: ALTCHOICE

## 2017-10-14 RX ORDER — HYDROCHLOROTHIAZIDE 25 MG/1
25 TABLET ORAL DAILY
Qty: 30 TABLET | Refills: 0 | Status: SHIPPED | OUTPATIENT
Start: 2017-10-14 | End: 2018-10-14

## 2017-10-14 RX ADMIN — HYDROCODONE BITARTRATE AND ACETAMINOPHEN 2 TABLET: 5; 325 TABLET ORAL at 12:10

## 2017-10-14 RX ADMIN — ONDANSETRON 4 MG: 4 TABLET, ORALLY DISINTEGRATING ORAL at 12:10

## 2017-10-14 NOTE — DISCHARGE INSTRUCTIONS
Please return immediately if you get worse or if new problems develop.  Elevate your feet.  You may wish use compression stockings.  Low-sodium diet.  Please restrict your fluids.  Stop your Norvasc.  Begin hydrochlorothiazide.  Please follow-up with the primary care doctor for pain management.  Rest.

## 2017-10-14 NOTE — ED PROVIDER NOTES
"Encounter Date: 10/14/2017    SCRIBE #1 NOTE: I, Rhonda Wright, am scribing for, and in the presence of,  Alex Nguyen MD. I have scribed the following portions of the note - Other sections scribed: HPI and ROS.       History     Chief Complaint   Patient presents with    Foot Pain     States she has bilateral foot pain and also states she has a hemorrhoid    Hemorrhoids     CC: Foot Swelling and Hemorrhoids    HPI: This 57 y.o. Female with PMHx of depression and HTN presents to the ED c/o chronic and intermittent bilateral foot swelling with associated pain. Pt states leg elevation alleviates the foot swelling. Pt is compliant with her fluid medications. Pt is on light salt diet. Pt also c/o 4 month hx of progressively worsening hemorrhoids with associated pain. Pt states hemorrhoids are"blocking stool" and have been giving her problems with bowel movements for the past 3 months. Pt notes she has intermittent and generalized chest pain with an onset that lasts approximately 15 minutes. Pt's last chest pain episode was yesterday. Pt works at [employer], but she is currently on leave. Pt denies fever, chills, ear pain, sore throat, eye pain, cough, SOB, N/V/D, dysuria, and rash.       The history is provided by the patient. No  was used.     Review of patient's allergies indicates:  No Known Allergies  Past Medical History:   Diagnosis Date    Depression     Encounter for blood transfusion     Hypertension      Past Surgical History:   Procedure Laterality Date    HYSTERECTOMY      TOTAL HIP ARTHROPLASTY       Family History   Problem Relation Age of Onset    Hypertension Mother     Hypertension Father     Heart disease Father     Hypertension Sister     Heart disease Sister     Heart disease Maternal Grandmother     Hypertension Maternal Grandmother     Heart disease Maternal Grandfather     Hypertension Maternal Grandfather     Heart disease Paternal Grandmother     Hypertension " Paternal Grandmother     Heart disease Paternal Grandfather     Hypertension Paternal Grandfather      Social History   Substance Use Topics    Smoking status: Former Smoker    Smokeless tobacco: Never Used    Alcohol use Yes      Comment: QOD last drink on 7/21/2017     Review of Systems   Constitutional: Negative for chills, diaphoresis and fever.   HENT: Negative for ear pain and sore throat.    Eyes: Negative for pain.   Respiratory: Negative for cough and shortness of breath.    Cardiovascular: Positive for chest pain (intermittent, generalized, onset that lasts approximately 15 minutes).        (+) Chronic, intermittent, bilateral foot swelling with associated pain   Gastrointestinal: Negative for abdominal pain, diarrhea, nausea and vomiting.         (+) Progressively worsening hemorrhoids with associated pain   Genitourinary: Negative for dysuria.   Musculoskeletal: Negative for back pain.   Skin: Negative for rash.   Neurological: Negative for headaches.       Physical Exam     Initial Vitals [10/14/17 1115]   BP Pulse Resp Temp SpO2   117/71 108 18 98.4 °F (36.9 °C) 100 %      MAP       86.33         Physical Exam  The patient was examined specifically for the following:   General:No significant distress, Good color, Warm and dry. Head and neck:Scalp atraumatic, Neck supple. Neurological:Appropriate conversation, Gross motor deficits. Eyes:Conjugate gaze, Clear corneas. ENT: No epistaxis. Cardiac: Regular rate and rhythm, Grossly normal heart tones. Pulmonary: Wheezing, Rales. Gastrointestinal: Abdominal tenderness, Abdominal distention. Musculoskeletal: Extremity deformity, Apparent pain with range of motion of the joints. Skin: Rash.   The findings on examination were normal except for the following: The patient has an anal fissure at the 8:00 position.  The lungs are clear and free wheezing rales of the rhonchi.  Heart tones are normal.  The patient is regular rate and rhythm.  The abdomen is soft.   There is no abdominal tenderness.  The patient has very mild to moderate edema of the feet bilaterally and symmetrically.  There is no calf or leg swelling.  ED Course   Procedures  Labs Reviewed   COMPREHENSIVE METABOLIC PANEL - Abnormal; Notable for the following:        Result Value    Albumin 2.8 (*)     ALT 9 (*)     All other components within normal limits   CBC W/ AUTO DIFFERENTIAL - Abnormal; Notable for the following:     RBC 3.16 (*)     Hemoglobin 10.2 (*)     Hematocrit 30.8 (*)     MCH 32.3 (*)     Gran% 30.4 (*)     Lymph% 52.8 (*)     All other components within normal limits   MAGNESIUM   TROPONIN I     EKG Readings: (Independently Interpreted)   This patient is in a normal sinus rhythm with a heart rate of 88.  The MT QRS and QT intervals are normal.  There is no evidence of acute myocardial infarction or malignant arrhythmia.  There are PACs.  There are PVCs.  Nonspecific T-wave changes.     Medical decision making: Given the above, this patient presents to the emergency room with pain in both of her feet.  She has mild to moderate edema of the feet.  The legs are not affected.  I doubt pulmonary embolus the venous thrombosis.  The patient has good capillary refill in both feet.  I doubt significant ischemia.  The patient does have an anal fissure.  I will treat her with Anusol HC suppositories.  I will stop her Norvasc and start her on hydrochlorothiazide.  I will have her return if she gets worse or if new problems develop.  I will have her elevate her feet.  We talked about low-sodium diet.                   Scribe Attestation:   Scribe #1: I performed the above scribed service and the documentation accurately describes the services I performed. I attest to the accuracy of the note.    Attending Attestation:           Physician Attestation for Scribe:  Physician Attestation Statement for Scribe #1: I, Alex Nguyen MD, reviewed documentation, as scribed by Rhonda Wright in my presence, and it  is both accurate and complete.                 ED Course      Clinical Impression:   The primary encounter diagnosis was Pedal edema. Diagnoses of Foot pain, bilateral, Acute anal fissure, and Chest pain, non-cardiac were also pertinent to this visit.                           Alex Nguyen MD  10/17/17 5782

## 2017-10-14 NOTE — ED TRIAGE NOTES
Pt here c/o bilateral feet and knee swelling x3 mo. Pt reports swelling comes and goes; progressively worse x2 days. Also c/o lower back pain; describes as pressure/burning; rates 9/10. Also reports right pain that begins under the arm and wraps around right side of chest; describes as pulling; rates 8/10.

## 2017-11-13 ENCOUNTER — HOSPITAL ENCOUNTER (EMERGENCY)
Facility: HOSPITAL | Age: 57
Discharge: HOME | End: 2017-11-13
Attending: EMERGENCY MEDICINE
Payer: MEDICAID

## 2017-11-13 VITALS
RESPIRATION RATE: 20 BRPM | DIASTOLIC BLOOD PRESSURE: 92 MMHG | WEIGHT: 170 LBS | OXYGEN SATURATION: 100 % | BODY MASS INDEX: 31.28 KG/M2 | HEART RATE: 118 BPM | TEMPERATURE: 99 F | HEIGHT: 62 IN | SYSTOLIC BLOOD PRESSURE: 141 MMHG
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
## 2017-11-13 DIAGNOSIS — T14.8XXA TRAUMATIC MYALGIA: Primary | ICD-10-CM

## 2017-11-13 PROCEDURE — 25000003 PHARM REV CODE 250: Performed by: NURSE PRACTITIONER

## 2017-11-13 PROCEDURE — 99283 EMERGENCY DEPT VISIT LOW MDM: CPT | Mod: 25

## 2017-11-13 PROCEDURE — 63600175 PHARM REV CODE 636 W HCPCS: Performed by: NURSE PRACTITIONER

## 2017-11-13 PROCEDURE — 96372 THER/PROPH/DIAG INJ SC/IM: CPT

## 2017-11-13 PROCEDURE — 25000003 PHARM REV CODE 250

## 2017-11-13 RX ORDER — TIZANIDINE 4 MG/1
4 TABLET ORAL EVERY 8 HOURS PRN
Qty: 20 TABLET | Refills: 0 | Status: SHIPPED | OUTPATIENT
Start: 2017-11-13 | End: 2019-04-22

## 2017-11-13 RX ORDER — KETOROLAC TROMETHAMINE 30 MG/ML
INJECTION, SOLUTION INTRAMUSCULAR; INTRAVENOUS
Status: DISCONTINUED
Start: 2017-11-13 | End: 2017-11-13 | Stop reason: HOSPADM

## 2017-11-13 RX ORDER — CYCLOBENZAPRINE HCL 10 MG
TABLET ORAL
Status: COMPLETED
Start: 2017-11-13 | End: 2017-11-13

## 2017-11-13 RX ORDER — CYCLOBENZAPRINE HCL 10 MG
10 TABLET ORAL
Status: COMPLETED | OUTPATIENT
Start: 2017-11-13 | End: 2017-11-13

## 2017-11-13 RX ORDER — KETOROLAC TROMETHAMINE 30 MG/ML
15 INJECTION, SOLUTION INTRAMUSCULAR; INTRAVENOUS
Status: COMPLETED | OUTPATIENT
Start: 2017-11-13 | End: 2017-11-13

## 2017-11-13 RX ORDER — NAPROXEN 500 MG/1
500 TABLET ORAL 2 TIMES DAILY PRN
Qty: 30 TABLET | Refills: 0 | Status: SHIPPED | OUTPATIENT
Start: 2017-11-13 | End: 2019-04-22

## 2017-11-13 RX ORDER — OXYCODONE AND ACETAMINOPHEN 5; 325 MG/1; MG/1
1 TABLET ORAL
Status: COMPLETED | OUTPATIENT
Start: 2017-11-13 | End: 2017-11-13

## 2017-11-13 RX ADMIN — Medication 10 MG: at 07:11

## 2017-11-13 RX ADMIN — KETOROLAC TROMETHAMINE 15 MG: 30 INJECTION, SOLUTION INTRAMUSCULAR at 07:11

## 2017-11-13 RX ADMIN — CYCLOBENZAPRINE HYDROCHLORIDE 10 MG: 10 TABLET, FILM COATED ORAL at 07:11

## 2017-11-13 RX ADMIN — OXYCODONE AND ACETAMINOPHEN 1 TABLET: 5; 325 TABLET ORAL at 08:11
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
## 2017-11-14 NOTE — DISCHARGE INSTRUCTIONS
Take pain medications as needed and only as prescribed.    Follow-up with orthopedic surgery for evaluation of spinal abnormalities.    Return to the emergency department for any new or worsening symptoms.

## 2018-01-15 ENCOUNTER — HOSPITAL ENCOUNTER (EMERGENCY)
Facility: HOSPITAL | Age: 58
Discharge: HOME | End: 2018-01-15
Attending: EMERGENCY MEDICINE
Payer: MEDICAID

## 2018-01-15 VITALS
TEMPERATURE: 98 F | SYSTOLIC BLOOD PRESSURE: 147 MMHG | HEIGHT: 62 IN | WEIGHT: 180 LBS | DIASTOLIC BLOOD PRESSURE: 101 MMHG | OXYGEN SATURATION: 93 % | HEART RATE: 102 BPM | BODY MASS INDEX: 33.13 KG/M2 | RESPIRATION RATE: 16 BRPM

## 2018-01-15 DIAGNOSIS — R41.82 ALTERED MENTAL STATUS: ICD-10-CM

## 2018-01-15 DIAGNOSIS — R51.9 HEADACHE: ICD-10-CM

## 2018-01-15 DIAGNOSIS — R07.9 CHEST PAIN: ICD-10-CM

## 2018-01-15 DIAGNOSIS — G62.9 PERIPHERAL POLYNEUROPATHY: Primary | ICD-10-CM

## 2018-01-15 LAB
ALBUMIN SERPL BCP-MCNC: 3.9 G/DL
ALP SERPL-CCNC: 161 U/L
ALT SERPL W/O P-5'-P-CCNC: 7 U/L
ANION GAP SERPL CALC-SCNC: 15 MMOL/L
APTT BLDCRRT: 26.1 SEC
AST SERPL-CCNC: 36 U/L
BASOPHILS # BLD AUTO: 0.04 K/UL
BASOPHILS NFR BLD: 0.5 %
BILIRUB SERPL-MCNC: 1.2 MG/DL
BUN SERPL-MCNC: 15 MG/DL
CALCIUM SERPL-MCNC: 10.5 MG/DL
CHLORIDE SERPL-SCNC: 104 MMOL/L
CO2 SERPL-SCNC: 21 MMOL/L
CREAT SERPL-MCNC: 0.8 MG/DL
DIFFERENTIAL METHOD: ABNORMAL
EOSINOPHIL # BLD AUTO: 0.1 K/UL
EOSINOPHIL NFR BLD: 1.7 %
ERYTHROCYTE [DISTWIDTH] IN BLOOD BY AUTOMATED COUNT: 16.7 %
EST. GFR  (AFRICAN AMERICAN): >60 ML/MIN/1.73 M^2
EST. GFR  (NON AFRICAN AMERICAN): >60 ML/MIN/1.73 M^2
GLUCOSE SERPL-MCNC: 77 MG/DL
HCT VFR BLD AUTO: 35.7 %
HGB BLD-MCNC: 12.1 G/DL
INR PPP: 1.1
LYMPHOCYTES # BLD AUTO: 4.2 K/UL
LYMPHOCYTES NFR BLD: 55.3 %
MAGNESIUM SERPL-MCNC: 1.5 MG/DL
MCH RBC QN AUTO: 31.3 PG
MCHC RBC AUTO-ENTMCNC: 33.9 G/DL
MCV RBC AUTO: 92 FL
MONOCYTES # BLD AUTO: 0.9 K/UL
MONOCYTES NFR BLD: 12 %
NEUTROPHILS # BLD AUTO: 2.3 K/UL
NEUTROPHILS NFR BLD: 30.2 %
PLATELET # BLD AUTO: 141 K/UL
PMV BLD AUTO: 9.4 FL
POTASSIUM SERPL-SCNC: 4.7 MMOL/L
PROT SERPL-MCNC: 8.5 G/DL
PROTHROMBIN TIME: 11.5 SEC
RBC # BLD AUTO: 3.87 M/UL
SODIUM SERPL-SCNC: 140 MMOL/L
TROPONIN I SERPL DL<=0.01 NG/ML-MCNC: 0.01 NG/ML
WBC # BLD AUTO: 7.65 K/UL

## 2018-01-15 PROCEDURE — 85025 COMPLETE CBC W/AUTO DIFF WBC: CPT

## 2018-01-15 PROCEDURE — 85730 THROMBOPLASTIN TIME PARTIAL: CPT

## 2018-01-15 PROCEDURE — 96374 THER/PROPH/DIAG INJ IV PUSH: CPT

## 2018-01-15 PROCEDURE — 63600175 PHARM REV CODE 636 W HCPCS: Performed by: EMERGENCY MEDICINE

## 2018-01-15 PROCEDURE — 93010 ELECTROCARDIOGRAM REPORT: CPT | Mod: ,,, | Performed by: INTERNAL MEDICINE

## 2018-01-15 PROCEDURE — 80053 COMPREHEN METABOLIC PANEL: CPT

## 2018-01-15 PROCEDURE — 84484 ASSAY OF TROPONIN QUANT: CPT

## 2018-01-15 PROCEDURE — 85610 PROTHROMBIN TIME: CPT

## 2018-01-15 PROCEDURE — 99285 EMERGENCY DEPT VISIT HI MDM: CPT | Mod: 25

## 2018-01-15 PROCEDURE — 96375 TX/PRO/DX INJ NEW DRUG ADDON: CPT

## 2018-01-15 PROCEDURE — 83735 ASSAY OF MAGNESIUM: CPT

## 2018-01-15 PROCEDURE — 96372 THER/PROPH/DIAG INJ SC/IM: CPT

## 2018-01-15 RX ORDER — AMLODIPINE BESYLATE 5 MG/1
5 TABLET ORAL DAILY
COMMUNITY

## 2018-01-15 RX ORDER — ONDANSETRON 2 MG/ML
4 INJECTION INTRAMUSCULAR; INTRAVENOUS
Status: COMPLETED | OUTPATIENT
Start: 2018-01-15 | End: 2018-01-15

## 2018-01-15 RX ORDER — MORPHINE SULFATE 10 MG/ML
5 INJECTION INTRAMUSCULAR; INTRAVENOUS; SUBCUTANEOUS
Status: COMPLETED | OUTPATIENT
Start: 2018-01-15 | End: 2018-01-15

## 2018-01-15 RX ORDER — TIZANIDINE 4 MG/1
4 TABLET ORAL EVERY 8 HOURS PRN
Qty: 30 TABLET | Refills: 0 | Status: SHIPPED | OUTPATIENT
Start: 2018-01-15 | End: 2018-01-25

## 2018-01-15 RX ORDER — GABAPENTIN 300 MG/1
300 CAPSULE ORAL 3 TIMES DAILY
Qty: 90 CAPSULE | Refills: 11 | Status: SHIPPED | OUTPATIENT
Start: 2018-01-15 | End: 2019-01-15

## 2018-01-15 RX ORDER — ORPHENADRINE CITRATE 30 MG/ML
30 INJECTION INTRAMUSCULAR; INTRAVENOUS
Status: COMPLETED | OUTPATIENT
Start: 2018-01-15 | End: 2018-01-15

## 2018-01-15 RX ADMIN — ONDANSETRON 4 MG: 2 INJECTION INTRAMUSCULAR; INTRAVENOUS at 01:01

## 2018-01-15 RX ADMIN — ORPHENADRINE CITRATE 30 MG: 30 INJECTION INTRAMUSCULAR; INTRAVENOUS at 01:01

## 2018-01-15 RX ADMIN — MORPHINE SULFATE 5 MG: 10 INJECTION INTRAVENOUS at 01:01

## 2018-01-15 NOTE — ED PROVIDER NOTES
Encounter Date: 1/15/2018    SCRIBE #1 NOTE: I, Mel Torres, am scribing for, and in the presence of,  Amee Alcala MD. I have scribed the following portions of the note - Other sections scribed: ROS and HPI.       History     Chief Complaint   Patient presents with    Leg Pain     Pt reports ongoing leg pain      CC: Leg Pain    HPI: This 57 y.o. female with a past medical history of Diabetic Neuropathy and Hypertension, presents to the ED complaining of chronic BLE and BUE pain for 6 months. The patient's sister notes pt is unable to walk for last 6 months. She reports numbness to her feet.  She notes her pain is worse (10/10) today and that she never felt this pain before.  Patient has not taken gabapentin in over a month because she ran out of her refills. She called the pharmacy who were not able to get in touch with her PCP.  She has not made an appointment with primary care physician.  She denies any new neurological deficits.  She denies any other complaint.  No prior medical intervention today for her pain.       The history is provided by the patient. No  was used.     Review of patient's allergies indicates:  No Known Allergies  Past Medical History:   Diagnosis Date    Depression     Encounter for blood transfusion     Hypertension      Past Surgical History:   Procedure Laterality Date    HYSTERECTOMY      TOTAL HIP ARTHROPLASTY       Family History   Problem Relation Age of Onset    Hypertension Mother     Hypertension Father     Heart disease Father     Hypertension Sister     Heart disease Sister     Heart disease Maternal Grandmother     Hypertension Maternal Grandmother     Heart disease Maternal Grandfather     Hypertension Maternal Grandfather     Heart disease Paternal Grandmother     Hypertension Paternal Grandmother     Heart disease Paternal Grandfather     Hypertension Paternal Grandfather      Social History   Substance Use Topics    Smoking status:  Former Smoker    Smokeless tobacco: Never Used    Alcohol use Yes      Comment: QOD last drink on 7/21/2017     Review of Systems   Constitutional: Negative for fever.   HENT: Negative for congestion and sore throat.    Eyes: Negative.    Respiratory: Negative for shortness of breath.    Cardiovascular: Negative for chest pain.   Gastrointestinal: Negative for nausea.   Genitourinary: Negative for difficulty urinating and dysuria.   Musculoskeletal: Negative for back pain.        (+) chronic worsening BLE and BUE pain   Skin: Negative for rash.   Neurological: Positive for numbness (tingling to BUE and BLE). Negative for dizziness, syncope, weakness, light-headedness and headaches.        Bilateral chronic foot pain.   Hematological: Does not bruise/bleed easily.   Psychiatric/Behavioral: Negative for agitation and behavioral problems.   All other systems reviewed and are negative.      Physical Exam     Initial Vitals [01/15/18 0937]   BP Pulse Resp Temp SpO2   (!) 185/105 106 18 98.5 °F (36.9 °C) 98 %      MAP       131.67         Physical Exam    Nursing note and vitals reviewed.  Constitutional: She appears well-developed and well-nourished. She is not diaphoretic. No distress.   HENT:   Head: Normocephalic and atraumatic.   Eyes: Conjunctivae and EOM are normal. Pupils are equal, round, and reactive to light.   Neck: Normal range of motion. Neck supple. No thyromegaly present.   Cardiovascular: Normal rate, regular rhythm, intact distal pulses and normal pulses. Exam reveals no gallop and no friction rub.    No murmur heard.  Pulses:       Dorsalis pedis pulses are 2+ on the right side, and 2+ on the left side.   Pulmonary/Chest: Breath sounds normal. No respiratory distress. She has no wheezes. She has no rhonchi. She has no rales.   Abdominal: Soft. Bowel sounds are normal. She exhibits no distension. There is no tenderness. There is no rebound and no guarding.   Musculoskeletal: Normal range of motion. She  exhibits no edema or tenderness.   Lymphadenopathy:     She has no cervical adenopathy.   Neurological: She is alert and oriented to person, place, and time. She has normal strength and normal reflexes. No cranial nerve deficit or sensory deficit.   Skin: Skin is warm and dry. Capillary refill takes less than 2 seconds.   Psychiatric: She has a normal mood and affect. Thought content normal.         ED Course   Procedures  Labs Reviewed   CBC W/ AUTO DIFFERENTIAL - Abnormal; Notable for the following:        Result Value    RBC 3.87 (*)     Hematocrit 35.7 (*)     MCH 31.3 (*)     RDW 16.7 (*)     Platelets 141 (*)     Gran% 30.2 (*)     Lymph% 55.3 (*)     All other components within normal limits   COMPREHENSIVE METABOLIC PANEL - Abnormal; Notable for the following:     CO2 21 (*)     Total Protein 8.5 (*)     Total Bilirubin 1.2 (*)     Alkaline Phosphatase 161 (*)     ALT 7 (*)     All other components within normal limits   MAGNESIUM - Abnormal; Notable for the following:     Magnesium 1.5 (*)     All other components within normal limits   TROPONIN I   PROTIME-INR   APTT     Imaging Results          CT Head Without Contrast (Final result)  Result time 01/15/18 12:13:53    Final result by Cassandra Jones MD (01/15/18 12:13:53)                 Impression:     No evidence acute intracranial abnormality.      Electronically signed by: CASSANDRA JONES MD  Date:     01/15/18  Time:    12:13              Narrative:    CT head without contrast    History: Head ache    Comparison: None    Technique: Contiguous axial images were acquired from vertex to skull base without contrast.    Findings: There is no evidence acute intracranial abnormality.  Specifically there is no evidence acute infarct, contusion, extra-axial fluid collection or midline shift.  The ventricles are normal in size and configuration.  The calvarium is intact.  The paranasal sinuses and mastoid air cells are clear.                             X-Ray  Chest 1 View (Final result)  Result time 01/15/18 10:36:11   Procedure changed from X-Ray Chest PA And Lateral     Final result by Tomás Lucio Jr., MD (01/15/18 10:36:11)                 Impression:     No acute consolidation.      Electronically signed by: TOMÁS LUCIO MD  Date:     01/15/18  Time:    10:36              Narrative:    Chest single view no comparison.  Heart size and pulmonary vessels are normal.  The lungs are well-aerated on this single projection.  Mild degenerative changes noted.                              EKG Readings: (Independently Interpreted)   Initial Reading: No STEMI. Rhythm: Normal Sinus Rhythm. Heart Rate: 85. ST Segments: Normal ST Segments. Other Impression: Non-specific T Waves changes          Medical Decision Making:   Differential Diagnosis:   Neuropathic Pain due to Peripheral Neuropathy.  Non Compliance with Medications.            Scribe Attestation:   Scribe #1: I performed the above scribed service and the documentation accurately describes the services I performed. I attest to the accuracy of the note.    Attending Attestation:           Physician Attestation for Scribe:  Physician Attestation Statement for Scribe #1: I, Amee Alcala MD, reviewed documentation, as scribed by Mel Torres in my presence, and it is both accurate and complete.     Comments: I, Dr. Alcala, personally performed the services described in this documentation. All medical record entries made by the scribe were at my direction and in my presence.  I have reviewed the chart and agree that the record reflects my personal performance and is accurate and complete.              ED Course      Clinical Impression:   The primary encounter diagnosis was Peripheral polyneuropathy. Diagnoses of Headache, Chest pain, and Altered mental status were also pertinent to this visit.    Disposition:   Disposition: Discharged  Condition: Stable                        Amee Alcala MD  01/16/18 4847

## 2018-01-15 NOTE — ED TRIAGE NOTES
Pt to the ED via EMS with c/o BLE pain which extends to upper extremities and into back. Pt reports tingling and numbness to Bilateral hands and feet. Pt reports a fall in October in which she thinks she may have hurt her feet. Pt reports hx of HTN, which she is not complient with medication. Pt reports extreme pain to bilateral feet. Kimberly cute distress noted. Pt placed on cardiac monitor.

## 2018-01-23 ENCOUNTER — HOSPITAL ENCOUNTER (EMERGENCY)
Facility: HOSPITAL | Age: 58
Discharge: HOME | End: 2018-01-23
Attending: EMERGENCY MEDICINE
Payer: MEDICAID

## 2018-01-23 VITALS
WEIGHT: 180 LBS | OXYGEN SATURATION: 95 % | HEIGHT: 62 IN | RESPIRATION RATE: 18 BRPM | SYSTOLIC BLOOD PRESSURE: 126 MMHG | DIASTOLIC BLOOD PRESSURE: 77 MMHG | BODY MASS INDEX: 33.13 KG/M2 | TEMPERATURE: 98 F | HEART RATE: 79 BPM

## 2018-01-23 DIAGNOSIS — M79.2 PERIPHERAL NEUROPATHIC PAIN: Primary | ICD-10-CM

## 2018-01-23 DIAGNOSIS — M79.671 PAIN IN BOTH FEET: ICD-10-CM

## 2018-01-23 DIAGNOSIS — M79.672 PAIN IN BOTH FEET: ICD-10-CM

## 2018-01-23 LAB
ALBUMIN SERPL BCP-MCNC: 3.8 G/DL
ALP SERPL-CCNC: 146 U/L
ALT SERPL W/O P-5'-P-CCNC: 10 U/L
ANION GAP SERPL CALC-SCNC: 10 MMOL/L
AST SERPL-CCNC: 25 U/L
BASOPHILS # BLD AUTO: 0.02 K/UL
BASOPHILS NFR BLD: 0.5 %
BILIRUB SERPL-MCNC: 1.9 MG/DL
BUN SERPL-MCNC: 13 MG/DL
CALCIUM SERPL-MCNC: 10 MG/DL
CHLORIDE SERPL-SCNC: 103 MMOL/L
CO2 SERPL-SCNC: 25 MMOL/L
CREAT SERPL-MCNC: 0.7 MG/DL
DIFFERENTIAL METHOD: ABNORMAL
EOSINOPHIL # BLD AUTO: 0.1 K/UL
EOSINOPHIL NFR BLD: 1.6 %
ERYTHROCYTE [DISTWIDTH] IN BLOOD BY AUTOMATED COUNT: 17.9 %
EST. GFR  (AFRICAN AMERICAN): >60 ML/MIN/1.73 M^2
EST. GFR  (NON AFRICAN AMERICAN): >60 ML/MIN/1.73 M^2
GLUCOSE SERPL-MCNC: 99 MG/DL
HCT VFR BLD AUTO: 32.3 %
HGB BLD-MCNC: 10.9 G/DL
LYMPHOCYTES # BLD AUTO: 2.2 K/UL
LYMPHOCYTES NFR BLD: 48.4 %
MAGNESIUM SERPL-MCNC: 1.6 MG/DL
MCH RBC QN AUTO: 32.4 PG
MCHC RBC AUTO-ENTMCNC: 33.7 G/DL
MCV RBC AUTO: 96 FL
MONOCYTES # BLD AUTO: 0.5 K/UL
MONOCYTES NFR BLD: 10.8 %
NEUTROPHILS # BLD AUTO: 1.7 K/UL
NEUTROPHILS NFR BLD: 38.5 %
PLATELET # BLD AUTO: 119 K/UL
PMV BLD AUTO: 11.5 FL
POTASSIUM SERPL-SCNC: 4.8 MMOL/L
PROT SERPL-MCNC: 7.8 G/DL
RBC # BLD AUTO: 3.36 M/UL
SODIUM SERPL-SCNC: 138 MMOL/L
WBC # BLD AUTO: 4.44 K/UL

## 2018-01-23 PROCEDURE — 63600175 PHARM REV CODE 636 W HCPCS: Performed by: NURSE PRACTITIONER

## 2018-01-23 PROCEDURE — 80053 COMPREHEN METABOLIC PANEL: CPT

## 2018-01-23 PROCEDURE — 96374 THER/PROPH/DIAG INJ IV PUSH: CPT

## 2018-01-23 PROCEDURE — 85025 COMPLETE CBC W/AUTO DIFF WBC: CPT

## 2018-01-23 PROCEDURE — 83735 ASSAY OF MAGNESIUM: CPT

## 2018-01-23 PROCEDURE — 99283 EMERGENCY DEPT VISIT LOW MDM: CPT | Mod: 25

## 2018-01-23 RX ORDER — HYDROCODONE BITARTRATE AND ACETAMINOPHEN 5; 325 MG/1; MG/1
1 TABLET ORAL EVERY 6 HOURS PRN
Qty: 10 TABLET | Refills: 0 | Status: SHIPPED | OUTPATIENT
Start: 2018-01-23 | End: 2019-04-22

## 2018-01-23 RX ORDER — DEXAMETHASONE SODIUM PHOSPHATE 4 MG/ML
8 INJECTION, SOLUTION INTRA-ARTICULAR; INTRALESIONAL; INTRAMUSCULAR; INTRAVENOUS; SOFT TISSUE
Status: COMPLETED | OUTPATIENT
Start: 2018-01-23 | End: 2018-01-23

## 2018-01-23 RX ORDER — IBUPROFEN 600 MG/1
600 TABLET ORAL EVERY 6 HOURS PRN
Qty: 20 TABLET | Refills: 0 | Status: SHIPPED | OUTPATIENT
Start: 2018-01-23 | End: 2019-04-22

## 2018-01-23 RX ADMIN — DEXAMETHASONE SODIUM PHOSPHATE 8 MG: 4 INJECTION, SOLUTION INTRAMUSCULAR; INTRAVENOUS at 05:01

## 2018-01-23 NOTE — ED TRIAGE NOTES
Pt reports tingling and pain in both feet that goes up to legs that has been going on the past few days; pt denies n/v, diarrhea, fever, chills

## 2018-01-24 NOTE — DISCHARGE INSTRUCTIONS
You have been prescribed NORCO for pain. Please do not take this medication while working, drinking alcohol, swimming, or while driving/operating heavy machinery. This medication may cause drowsiness, impair judgment, and reduce physical capabilities.    You have been prescribed Naproxen for pain. This is an Non-Steroidal Anti-Inflammatory (NSAID) Medication. Please do not take any additional NSAIDs while you are taking this medication including (Advil, Aleve, Motrin, Ibuprofen, Mobic\meloxicam, Naprosyn, etc.). Please stop taking this medication if you experience: weakness, itching, yellow skin or eyes, joint pains, vomiting blood, blood or black stools, unusual weight gain, or swelling in your arms, legs, hands, or feet.     Please return to the Emergency Department for any new or worsening symptoms including: fever, chest pain, shortness of breath, loss of consciousness, dizziness, weakness, or any other concerns.     Please follow up with your Primary Care Provider within in the week. If you do not have one, you may contact the one listed on your discharge paperwork or you may also call the [facility] Appointment Desk at [phone] to schedule an appointment with one.     Please take all medication as prescribed.

## 2018-01-28 NOTE — ED PROVIDER NOTES
"Encounter Date: 1/23/2018       History     Chief Complaint   Patient presents with    Pain in feet     Patient states that she has had pain in her feet. Patient states that she was here on Tuesday and they told her it was neuropathy. Patient was prescribed gabapentin.      CC: Pain in feet    HPI: Patient is a 57-year-old female who presents today with bilateral lower extremity pain.  She's been experiencing the same pain for years.  He reports the pain starts in her feet and radiates up to her shoulders.  Pain is intermittent, described as sharp, rated 10 out of 10 on pain scale.  She was seen here in the ED for the same problem 1/15/2018.  She has been taking gabapentin with minimal relief in pain.  She was prescribed Zanaflex, however she is not taking this medication because it made her "see things".  She denies fever, chills, shortness of breath, chest pain, cough, abdominal pain, back pain, nausea, vomiting, diarrhea.      The history is provided by the patient and the spouse. No  was used.     Review of patient's allergies indicates:  No Known Allergies  Past Medical History:   Diagnosis Date    Depression     Encounter for blood transfusion     Hypertension      Past Surgical History:   Procedure Laterality Date    HYSTERECTOMY      TOTAL HIP ARTHROPLASTY       Family History   Problem Relation Age of Onset    Hypertension Mother     Hypertension Father     Heart disease Father     Hypertension Sister     Heart disease Sister     Heart disease Maternal Grandmother     Hypertension Maternal Grandmother     Heart disease Maternal Grandfather     Hypertension Maternal Grandfather     Heart disease Paternal Grandmother     Hypertension Paternal Grandmother     Heart disease Paternal Grandfather     Hypertension Paternal Grandfather      Social History   Substance Use Topics    Smoking status: Former Smoker    Smokeless tobacco: Never Used    Alcohol use No      Comment: " QOD last drink on 7/21/2017     Review of Systems   Constitutional: Negative for chills and fever.   HENT: Negative for sore throat.    Respiratory: Negative for apnea, cough, choking, chest tightness, shortness of breath, wheezing and stridor.    Cardiovascular: Negative for chest pain, palpitations and leg swelling.   Gastrointestinal: Negative for nausea.   Genitourinary: Negative for difficulty urinating, dysuria and pelvic pain.   Musculoskeletal: Positive for arthralgias, gait problem and myalgias. Negative for back pain, joint swelling, neck pain and neck stiffness.   Skin: Negative for rash.   Neurological: Negative for weakness.   Hematological: Does not bruise/bleed easily.       Physical Exam     Initial Vitals [01/23/18 1524]   BP Pulse Resp Temp SpO2   127/74 93 18 97.8 °F (36.6 °C) 97 %      MAP       91.67         Physical Exam    Constitutional: She appears well-developed and well-nourished. She is not diaphoretic. No distress.   HENT:   Head: Normocephalic and atraumatic.   Neck: Normal range of motion.   Cardiovascular: Normal rate, regular rhythm, normal heart sounds and intact distal pulses. Exam reveals no gallop and no friction rub.    No murmur heard.  Pulses:       Dorsalis pedis pulses are 2+ on the right side, and 2+ on the left side.        Posterior tibial pulses are 2+ on the right side, and 2+ on the left side.   Pulmonary/Chest: Breath sounds normal. No respiratory distress. She has no wheezes. She has no rhonchi. She has no rales. She exhibits no tenderness.   Abdominal: Soft. Bowel sounds are normal. She exhibits no distension and no mass. There is no hepatosplenomegaly. There is no tenderness. There is no rigidity, no rebound, no guarding, no CVA tenderness, no tenderness at McBurney's point and negative Neri's sign.   Musculoskeletal: Normal range of motion.        Right ankle: Normal. She exhibits no swelling.        Left ankle: Normal. She exhibits no swelling.        Right  lower leg: She exhibits no swelling and no edema.        Left lower leg: She exhibits no swelling and no edema.        Right foot: There is no swelling.        Left foot: There is no swelling.   Neurological: She is alert and oriented to person, place, and time.   Skin: Skin is warm and dry.   Psychiatric: She has a normal mood and affect. Her behavior is normal.         ED Course   Procedures  Labs Reviewed   CBC W/ AUTO DIFFERENTIAL - Abnormal; Notable for the following:        Result Value    RBC 3.36 (*)     Hemoglobin 10.9 (*)     Hematocrit 32.3 (*)     MCH 32.4 (*)     RDW 17.9 (*)     Platelets 119 (*)     Gran # (ANC) 1.7 (*)     Lymph% 48.4 (*)     All other components within normal limits   COMPREHENSIVE METABOLIC PANEL - Abnormal; Notable for the following:     Total Bilirubin 1.9 (*)     Alkaline Phosphatase 146 (*)     All other components within normal limits    Narrative:     Recoll. [id number] by TND at 01/23/2018 18:16, reason: Specimen   hemolyzed   MAGNESIUM    Narrative:     Recoll. [id number] by TND at 01/23/2018 18:16, reason: Specimen   hemolyzed             Medical Decision Making:   ED Management:  Physical Exam shows a non-toxic, afebrile, and well appearing female.  Examination the right leg is without obvious deformity or asymmetry when compared to the left leg.  No soft tissue swelling or edema.  No overlying erythema, warmth, discoloration.  No lesions or break in skin integrity.  Posterior lower leg are soft, supple, nontender, and no palpable cords or evidence of thrombophlebitis.  No evidence of compartment syndrome.  Medial thigh is without soft tissue swelling or tenderness to palpation.  2+ dorsalis pedis and posterior tibial pulses.    Vital Signs Are Reassuring. If available, previous records reviewed.   RESULTS:  The chemistry was negative for hypo-or hyper natremia, kalemia, chloridemia, or other electrolyte abnormalities; BUN and creatinine were within normal limits  indicating normal kidney function, ALT and AST were within normal limits indicating normal liver function, there was no transaminitis.  Magnesium within normal limits.  CBC with mild anemia (Hg 10.9 and HCT 32.3), consistent with values on file.    My overall impression is peripheral neuropathic pain. I considered, but at this time, do not suspect fracture, dislocation, neurovascular compromise, DVT, electrolyte imbalance.    Given steroid injection with good relief in pain.  This is a chronic problem.  She was instructed to call and schedule an appointment with her PCP.    ED Course: Dexamethasone injection. D/C Meds: Motrin, Norco.The diagnosis, treatment plan, instructions for follow-up and reevaluation with PCP as well as ED return precautions were discussed and understanding was verbalized. All questions or concerns have been addressed.     This case was discussed with Dr. Holden who is in agreement with my assessment and plan.                    ED Course      Clinical Impression:   The primary encounter diagnosis was Peripheral neuropathic pain. A diagnosis of Pain in both feet was also pertinent to this visit.    Disposition:   Disposition: Discharged  Condition: Stable                        Mis Lorenz NP  01/28/18 1776

## 2019-04-22 ENCOUNTER — OFFICE VISIT (OUTPATIENT)
Dept: NEUROLOGY | Facility: CLINIC | Age: 59
End: 2019-04-22
Payer: MEDICAID

## 2019-04-22 VITALS
HEIGHT: 62 IN | WEIGHT: 179.88 LBS | BODY MASS INDEX: 33.1 KG/M2 | DIASTOLIC BLOOD PRESSURE: 85 MMHG | SYSTOLIC BLOOD PRESSURE: 133 MMHG | HEART RATE: 70 BPM

## 2019-04-22 DIAGNOSIS — M79.605 PAIN IN BOTH LOWER EXTREMITIES: Primary | ICD-10-CM

## 2019-04-22 DIAGNOSIS — M79.604 PAIN IN BOTH LOWER EXTREMITIES: Primary | ICD-10-CM

## 2019-04-22 DIAGNOSIS — R26.81 GAIT INSTABILITY: ICD-10-CM

## 2019-04-22 PROCEDURE — 99999 PR PBB SHADOW E&M-EST. PATIENT-LVL III: CPT | Mod: PBBFAC,,, | Performed by: NEUROLOGICAL SURGERY

## 2019-04-22 PROCEDURE — 99215 PR OFFICE/OUTPT VISIT, EST, LEVL V, 40-54 MIN: ICD-10-PCS | Mod: S$PBB,,, | Performed by: NEUROLOGICAL SURGERY

## 2019-04-22 PROCEDURE — 99999 PR PBB SHADOW E&M-EST. PATIENT-LVL III: ICD-10-PCS | Mod: PBBFAC,,, | Performed by: NEUROLOGICAL SURGERY

## 2019-04-22 PROCEDURE — 99213 OFFICE O/P EST LOW 20 MIN: CPT | Mod: PBBFAC,PO | Performed by: NEUROLOGICAL SURGERY

## 2019-04-22 PROCEDURE — 99215 OFFICE O/P EST HI 40 MIN: CPT | Mod: S$PBB,,, | Performed by: NEUROLOGICAL SURGERY

## 2019-04-22 RX ORDER — PREGABALIN 75 MG/1
75 CAPSULE ORAL 2 TIMES DAILY
Qty: 60 CAPSULE | Refills: 2 | Status: SHIPPED | OUTPATIENT
Start: 2019-04-22 | End: 2019-07-19 | Stop reason: SDUPTHER

## 2019-05-08 NOTE — PROGRESS NOTES
"  [facility]  Physical Therapy Initial Evaluation    Name: Kylie Aleman  Clinic Number: [id number]    Therapy Diagnosis:   Encounter Diagnoses   Name Primary?    Bilateral chronic knee pain     Bilateral foot pain     Unsteady gait     Weakness of lower extremity, unspecified laterality      Physician: Mannie Hutchison MD    Physician Orders: PT Eval and Treat   Medical Diagnosis from Referral: pain in both lower extremeties; Gait instability;  Evaluation Date: 5/9/2019  Authorization Period Expiration: 4/22/2019  Plan of Care Expiration: 8/9/2019  Visit # / Visits authorized: 1/ 1    Time In: 0910  Time Out: 0950  Total Billable Time: 40 minutes    Precautions: Standard    Subjective   Date of onset: 7 years ago pain in knees and feet started, been using w/c for about 2 years.   History of current condition - Kylie reports: she has pain "everywhere" has arthritis in her knees and has neuropathy and heel spurs in her feet. Reports she has a disc "out" in her back which she thinks that is causing.  Reports her foot pain is on the bottom of her feet, both feet are equally as painful. Reports her R knee is more painful than the left. Pt reports she has been using her w/c all the time, states she has been using it for 2 years now. Reports she never walks anymore.      Medical History:   Past Medical History:   Diagnosis Date    Depression     Encounter for blood transfusion     Hypertension        Surgical History:   Kylie Aleman  has a past surgical history that includes Hysterectomy and Total hip arthroplasty.    Medications:   Kylie has a current medication list which includes the following prescription(s): amlodipine, escitalopram oxalate, gabapentin, hydrochlorothiazide, ibuprofen, and pregabalin.    Allergies:   Review of patient's allergies indicates:  No Known Allergies       Prior Therapy: Pt had PT about a year ago for her knees and legs, reports she was " noncompliant.  Social History: Pt  lives with their son  Occupation: No longer working, worked at [employer].  Prior Level of Function: Patient was ambulating with use of FWW or with cart when she was at work. Patient was able to stand and walk without pain.   Current Level of Function: Patient currently using a w/c constantly, only performs pop over transfers into her.     Pain:  Current 7/10, worst 10/10, best 0/10   Location: bilateral knee   Description: Aching and Grabbing  Aggravating Factors: Standing and Walking  Easing Factors: massage and pain medication    Pts goals: pt would like to get herself walking again.    Objective     Observation: Pt arrived in w/c able to self-propel    Posture Alignment: pt demonstrates poor seated posture, rounded back, PPT, rounded shoulders and forward head    Sensation: Light touch: Neuropathy in plantar surface of B feet    DTR: WNL    GAIT DEVIATIONS: Pt only able to ambulate x5' with FWW with CGA (Gait belt required)   [place] displays: forward lean, shortened step length, unsteadiness, decreased heel strike, decrease toe off, antalgic gait bilaterally.    Sit to stand: CGA with Gait belt donned, required cues for proper hand placement and sequencing      Knee Range of Motion:   Left Passive Right Passive   Flexion WNL WNL   Extension 20 25       Lower Extremity Strength  Right LE  Left LE    Knee extension: 4/5 Knee extension: 3+/5   Knee flexion: 4-/5 Knee flexion: 3+/5   Hip flexion: 4-/5 Hip flexion: 4/5   Hip Internal Rotation:  4/5    Hip Internal Rotation: 4/5      Hip External Rotation: 4/5    Hip External Rotation: 4/5      Hip extension:  4/5 Hip extension: 4/5   Hip abduction: 3+/5 Hip abduction: 3+/5   Hip adduction: 5/5 Hip adduction: 5/5   Ankle dorsiflexion: 5/5 Ankle dorsiflexion: 5/5   Ankle plantarflexion: 4/5 Ankle plantarflexion: 4/5         TREATMENT   Treatment Time In: 0940  Treatment Time Out: 0950  Total Treatment time separate from Evaluation: 10  "minutes    Kylie received therapeutic exercises to develop strength, ROM and flexibility for 10 minutes including:  B HS stretch with strap 3x30"  B LAQ 3x20x2" holds  B Marching 2x20  Ankle pumps 2x20      Home Exercises and Patient Education Provided    Education provided:   - Pt educated on proper form and technique with exercises    Written Home Exercises Provided: yes.  Exercises were reviewed and Kylie was able to demonstrate them prior to the end of the session.  Kylie demonstrated good  understanding of the education provided.     See EMR under Patient Instructions for exercises provided 5/9/2019.    Assessment   Kylie is a 59 y.o. female referred to outpatient Physical Therapy with a medical diagnosis of pain in both lower extremities and gait instability. Pt presents with c/o pain t/o entire body. Reports her knee and foot pain are what cause most of her pain and have prevented her from walking. Pt has been using a w/c for mobility for 2 years, reports she is unable to walk or stand due to her pain. Patient demonstrates significant B knee ext ROM deficits which likely contribute to pain when pt is standing or walking. Also demonstrates lower extremity weakness bilaterally. Patient required use of gait belt/CGA during sit<>stand transfers and was only able to tolerate ambulating 5' d/t pain. Overall pt demonstrating decreased strength, endurance, balance, mobility, and ROM.     Pt prognosis is Good.   Pt will benefit from skilled outpatient Physical Therapy to address the deficits stated above and in the chart below, provide pt/family education, and to maximize pt's level of independence.     Plan of care discussed with patient: Yes  Pt's spiritual, cultural and educational needs considered and patient is agreeable to the plan of care and goals as stated below:     Anticipated Barriers for therapy: None    Medical Necessity is demonstrated by the following  History  Co-morbidities and personal " factors that may impact the plan of care Co-morbidities:   HTN    Personal Factors:   no deficits     low   Examination  Body Structures and Functions, activity limitations and participation restrictions that may impact the plan of care Body Regions:   lower extremities    Body Systems:    ROM  strength  gross coordinated movement  balance  gait  transfers    Participation Restrictions:   Work and recreation    Activity limitations:   Learning and applying knowledge  no deficits    General Tasks and Commands  no deficits    Communication  no deficits    Mobility  lifting and carrying objects  walking  using transportation (bus, train, plane, car)  driving (bike, car, motorcycle)    Self care  washing oneself (bathing, drying, washing hands)  toileting  dressing    Domestic Life  shopping  doing house work (cleaning house, washing dishes, laundry)    Interactions/Relationships  no deficits    Life Areas  no deficits    Community and Social Life  no deficits         Complexity: low  See FOTO outcome assessment    Clinical Presentation stable and uncomplicated low   Decision Making/ Complexity Score: low     GOALS: Short Term Goals:  2-3 weeks  1.Report decreased in pain at worse less than <   / =  4  /10  to increase tolerance for functional activities.  2. Pt to improve B knee range of motion by 8 degrees to allow for improved functional mobility to allow for improvement in IADLs.   3. Increased B knee  MMT 1/2  to increase tolerance for ADL and work activities.  4. Pt to report an ability to perform stand pivot transfer into her sofa.  5. Pt to tolerate HEP to improve ROM and independence with ADL's    Long Term Goals:  6-8 weeks  1. Report decreased in pain at worse less than  <   / =  2  /10  to increase tolerance for functional mobility  2. Pt to improve B knee range of motion by 8 degrees to allow for improved functional mobility to allow for improvement in IADLs.    3. Increased B knee MMT 1 grade  to  increase tolerance for ADL and work activities.  4. Pt will scores report at CJ level (20-40% impaired) on LEFS  to demonstrate increase in LE function with every day tasks.   5. Pt to be Independent with HEP to improve ROM and independence with ADL's    Plan   Plan of care Certification: 5/9/2019 to 8/9/2019.    Outpatient Physical Therapy 2 times weekly for 10 weeks to include the following interventions: Gait Training, Moist Heat/ Ice, Neuromuscular Re-ed, Patient Education, Therapeutic Activites and Therapeutic Exercise.         Jorge Cramer, PT

## 2019-05-09 ENCOUNTER — CLINICAL SUPPORT (OUTPATIENT)
Dept: REHABILITATION | Facility: HOSPITAL | Age: 59
End: 2019-05-09
Attending: NEUROLOGICAL SURGERY
Payer: MEDICAID

## 2019-05-09 DIAGNOSIS — M79.671 BILATERAL FOOT PAIN: ICD-10-CM

## 2019-05-09 DIAGNOSIS — G89.29 BILATERAL CHRONIC KNEE PAIN: ICD-10-CM

## 2019-05-09 DIAGNOSIS — M25.562 BILATERAL CHRONIC KNEE PAIN: ICD-10-CM

## 2019-05-09 DIAGNOSIS — R26.81 UNSTEADY GAIT: ICD-10-CM

## 2019-05-09 DIAGNOSIS — R29.898 WEAKNESS OF LOWER EXTREMITY, UNSPECIFIED LATERALITY: ICD-10-CM

## 2019-05-09 DIAGNOSIS — M79.672 BILATERAL FOOT PAIN: ICD-10-CM

## 2019-05-09 DIAGNOSIS — M25.561 BILATERAL CHRONIC KNEE PAIN: ICD-10-CM

## 2019-05-09 PROCEDURE — 97161 PT EVAL LOW COMPLEX 20 MIN: CPT | Mod: PN

## 2019-05-09 PROCEDURE — 97110 THERAPEUTIC EXERCISES: CPT | Mod: PN

## 2019-05-09 NOTE — PLAN OF CARE
"  [facility]  Physical Therapy Initial Evaluation    Name: Kylie Aleman  Clinic Number: [id number]    Therapy Diagnosis:   Encounter Diagnoses   Name Primary?    Bilateral chronic knee pain     Bilateral foot pain     Unsteady gait     Weakness of lower extremity, unspecified laterality      Physician: Mannie Hutchison MD    Physician Orders: PT Eval and Treat   Medical Diagnosis from Referral: pain in both lower extremeties; Gait instability;  Evaluation Date: 5/9/2019  Authorization Period Expiration: 4/22/2019  Plan of Care Expiration: 8/9/2019  Visit # / Visits authorized: 1/ 1    Time In: 0910  Time Out: 0950  Total Billable Time: 40 minutes    Precautions: Standard    Subjective   Date of onset: 7 years ago pain in knees and feet started, been using w/c for about 2 years.   History of current condition - Kylie reports: she has pain "everywhere" has arthritis in her knees and has neuropathy and heel spurs in her feet. Reports she has a disc "out" in her back which she thinks that is causing.  Reports her foot pain is on the bottom of her feet, both feet are equally as painful. Reports her R knee is more painful than the left. Pt reports she has been using her w/c all the time, states she has been using it for 2 years now. Reports she never walks anymore.      Medical History:   Past Medical History:   Diagnosis Date    Depression     Encounter for blood transfusion     Hypertension        Surgical History:   Kylie Aleman  has a past surgical history that includes Hysterectomy and Total hip arthroplasty.    Medications:   Kylie has a current medication list which includes the following prescription(s): amlodipine, escitalopram oxalate, gabapentin, hydrochlorothiazide, ibuprofen, and pregabalin.    Allergies:   Review of patient's allergies indicates:  No Known Allergies       Prior Therapy: Pt had PT about a year ago for her knees and legs, reports she was " noncompliant.  Social History: Pt  lives with their son  Occupation: No longer working, worked at [employer].  Prior Level of Function: Patient was ambulating with use of FWW or with cart when she was at work. Patient was able to stand and walk without pain.   Current Level of Function: Patient currently using a w/c constantly, only performs pop over transfers into her.     Pain:  Current 7/10, worst 10/10, best 0/10   Location: bilateral knee   Description: Aching and Grabbing  Aggravating Factors: Standing and Walking  Easing Factors: massage and pain medication    Pts goals: pt would like to get herself walking again.    Objective     Observation: Pt arrived in w/c able to self-propel    Posture Alignment: pt demonstrates poor seated posture, rounded back, PPT, rounded shoulders and forward head    Sensation: Light touch: Neuropathy in plantar surface of B feet    DTR: WNL    GAIT DEVIATIONS: Pt only able to ambulate x5' with FWW with CGA (Gait belt required)   [place] displays: forward lean, shortened step length, unsteadiness, decreased heel strike, decrease toe off, antalgic gait bilaterally.    Sit to stand: CGA with Gait belt donned, required cues for proper hand placement and sequencing      Knee Range of Motion:   Left Passive Right Passive   Flexion WNL WNL   Extension 20 25       Lower Extremity Strength  Right LE  Left LE    Knee extension: 4/5 Knee extension: 3+/5   Knee flexion: 4-/5 Knee flexion: 3+/5   Hip flexion: 4-/5 Hip flexion: 4/5   Hip Internal Rotation:  4/5    Hip Internal Rotation: 4/5      Hip External Rotation: 4/5    Hip External Rotation: 4/5      Hip extension:  4/5 Hip extension: 4/5   Hip abduction: 3+/5 Hip abduction: 3+/5   Hip adduction: 5/5 Hip adduction: 5/5   Ankle dorsiflexion: 5/5 Ankle dorsiflexion: 5/5   Ankle plantarflexion: 4/5 Ankle plantarflexion: 4/5         TREATMENT   Treatment Time In: 0940  Treatment Time Out: 0950  Total Treatment time separate from Evaluation: 10  "minutes    Kylie received therapeutic exercises to develop strength, ROM and flexibility for 10 minutes including:  B HS stretch with strap 3x30"  B LAQ 3x20x2" holds  B Marching 2x20  Ankle pumps 2x20      Home Exercises and Patient Education Provided    Education provided:   - Pt educated on proper form and technique with exercises    Written Home Exercises Provided: yes.  Exercises were reviewed and Kylie was able to demonstrate them prior to the end of the session.  Kylie demonstrated good  understanding of the education provided.     See EMR under Patient Instructions for exercises provided 5/9/2019.    Assessment   Kylie is a 59 y.o. female referred to outpatient Physical Therapy with a medical diagnosis of pain in both lower extremities and gait instability. Pt presents with c/o pain t/o entire body. Reports her knee and foot pain are what cause most of her pain and have prevented her from walking. Pt has been using a w/c for mobility for 2 years, reports she is unable to walk or stand due to her pain. Patient demonstrates significant B knee ext ROM deficits which likely contribute to pain when pt is standing or walking. Also demonstrates lower extremity weakness bilaterally. Patient required use of gait belt/CGA during sit<>stand transfers and was only able to tolerate ambulating 5' d/t pain. Overall pt demonstrating decreased strength, endurance, balance, mobility, and ROM.     Pt prognosis is Good.   Pt will benefit from skilled outpatient Physical Therapy to address the deficits stated above and in the chart below, provide pt/family education, and to maximize pt's level of independence.     Plan of care discussed with patient: Yes  Pt's spiritual, cultural and educational needs considered and patient is agreeable to the plan of care and goals as stated below:     Anticipated Barriers for therapy: None    Medical Necessity is demonstrated by the following  History  Co-morbidities and personal " factors that may impact the plan of care Co-morbidities:   HTN    Personal Factors:   no deficits     low   Examination  Body Structures and Functions, activity limitations and participation restrictions that may impact the plan of care Body Regions:   lower extremities    Body Systems:    ROM  strength  gross coordinated movement  balance  gait  transfers    Participation Restrictions:   Work and recreation    Activity limitations:   Learning and applying knowledge  no deficits    General Tasks and Commands  no deficits    Communication  no deficits    Mobility  lifting and carrying objects  walking  using transportation (bus, train, plane, car)  driving (bike, car, motorcycle)    Self care  washing oneself (bathing, drying, washing hands)  toileting  dressing    Domestic Life  shopping  doing house work (cleaning house, washing dishes, laundry)    Interactions/Relationships  no deficits    Life Areas  no deficits    Community and Social Life  no deficits         Complexity: low  See FOTO outcome assessment    Clinical Presentation stable and uncomplicated low   Decision Making/ Complexity Score: low     GOALS: Short Term Goals:  2-3 weeks  1.Report decreased in pain at worse less than <   / =  4  /10  to increase tolerance for functional activities.  2. Pt to improve B knee range of motion by 8 degrees to allow for improved functional mobility to allow for improvement in IADLs.   3. Increased B knee  MMT 1/2  to increase tolerance for ADL and work activities.  4. Pt to report an ability to perform stand pivot transfer into her sofa.  5. Pt to tolerate HEP to improve ROM and independence with ADL's    Long Term Goals:  6-8 weeks  1. Report decreased in pain at worse less than  <   / =  2  /10  to increase tolerance for functional mobility  2. Pt to improve B knee range of motion by 8 degrees to allow for improved functional mobility to allow for improvement in IADLs.    3. Increased B knee MMT 1 grade  to  increase tolerance for ADL and work activities.  4. Pt will scores report at CJ level (20-40% impaired) on LEFS  to demonstrate increase in LE function with every day tasks.   5. Pt to be Independent with HEP to improve ROM and independence with ADL's    Plan   Plan of care Certification: 5/9/2019 to 8/9/2019.    Outpatient Physical Therapy 2 times weekly for 10 weeks to include the following interventions: Gait Training, Moist Heat/ Ice, Neuromuscular Re-ed, Patient Education, Therapeutic Activites and Therapeutic Exercise.         Jorge Cramer, PT

## 2019-05-10 NOTE — PROGRESS NOTES
"  Physical Therapy Daily Treatment Note     Name: Kylie Aleman  Clinic Number: [id number]    Therapy Diagnosis:   Encounter Diagnoses   Name Primary?    Bilateral chronic knee pain     Bilateral foot pain     Unsteady gait     Weakness of lower extremity, unspecified laterality      Physician: Mannie Hutchison MD    Visit Date: 5/13/2019    Physician Orders: PT Eval and Treat  Medical Diagnosis: pain in both lower extremities, gait instability  Evaluation Date: 5/9/19  Authorization Period Expiration: 4/22/2019  Plan of Care Certification Period: 8/9/2019  Visit #/Visits authorized: 2/ 3     Time In: 1000  Time Out: 1055  Total Billable Time: 30 minutes    Precautions: Standard    Subjective     Pt reports: Has been compliant with HEP. Feels that things are about the same    She was compliant with home exercise program.  Response to previous treatment: Muscle fatigue   Functional change: No change     Pain: 5/10  Location: bilateral feet  and knee joint     Objective     Kylie received therapeutic exercises to develop strength and endurance for 55 minutes including:    B HS stretch with strap 3x30"  B LAQ 3x20x2" holds  B Marching 2x20  Ankle pumps 2x20  +SAQ 2 x 20   +TKE x 20   +Bridge 2 x 10   +HL hip add x 30   +Hl hip abd RTB x 30   +SLR 2 x 10         Kylie received the following manual therapy techniques: Myofacial release were applied for 0 minutes, including:      Kylie participated in neuromuscular re-education activities to improve: Balance for 0 minutes. The following activities were included:      Kylie participated in dynamic functional therapeutic activities to improve functional performance for 0  minutes, including:    +Sit<>Stand from high/low mat - initiate next session    Kylie participated in gait training to improve functional mobility and safety for 0  minutes, including:  CGA -FWW      [place] received hot pack for 0 minutes to .    Kylie received cold pack for 10 minutes " to bilateral knees.      Home Exercises Provided and Patient Education Provided     Education provided:   - Continue with HEP as tolerated    Written Home Exercises Provided: Patient instructed to cont prior HEP.  Exercises were reviewed and Kylie was able to demonstrate them prior to the end of the session.  Kylie demonstrated good  understanding of the education provided.     See EMR under Patient Instructions for exercises provided prior visit.    Assessment     Patient tolerated today's treatment session well. Minor cues required for proper technique with exercises. Staff progressed strengthening activities today with appropriate level of muscle fatigue achieved. CGA with sit<>stand and pivot transfer. Patient encouraged to continue with HEP outside of sessions for maximum benefit.   Kylie is progressing well towards her goals.   Pt prognosis is Fair.     Pt will continue to benefit from skilled outpatient physical therapy to address the deficits listed in the problem list box on initial evaluation, provide pt/family education and to maximize pt's level of independence in the home and community environment.     Pt's spiritual, cultural and educational needs considered and pt agreeable to plan of care and goals.     Anticipated barriers to physical therapy: LE weakness    Goals:     Short Term Goals:  2-3 weeks  1.Report decreased in pain at worse less than <   / =  4  /10  to increase tolerance for functional activities.  2. Pt to improve B knee range of motion by 8 degrees to allow for improved functional mobility to allow for improvement in IADLs.   3. Increased B knee  MMT 1/2  to increase tolerance for ADL and work activities.  4. Pt to report an ability to perform stand pivot transfer into her sofa.  5. Pt to tolerate HEP to improve ROM and independence with ADL's     Long Term Goals:  6-8 weeks  1. Report decreased in pain at worse less than  <   / =  2  /10  to increase tolerance for functional  mobility  2. Pt to improve B knee range of motion by 8 degrees to allow for improved functional mobility to allow for improvement in IADLs.    3. Increased B knee MMT 1 grade  to increase tolerance for ADL and work activities.  4. Pt will scores report at CJ level (20-40% impaired) on LEFS  to demonstrate increase in LE function with every day tasks.   5. Pt to be Independent with HEP to improve ROM and independence with ADL's        Plan     Plan of care Certification: 5/9/2019 to 8/9/2019.     Outpatient Physical Therapy 2 times weekly for 10 weeks to include the following interventions: Gait Training, Moist Heat/ Ice, Neuromuscular Re-ed, Patient Education, Therapeutic Activites and Therapeutic Exercise.        Cy Broussard, PTA

## 2019-05-13 ENCOUNTER — CLINICAL SUPPORT (OUTPATIENT)
Dept: REHABILITATION | Facility: HOSPITAL | Age: 59
End: 2019-05-13
Attending: NEUROLOGICAL SURGERY
Payer: MEDICAID

## 2019-05-13 DIAGNOSIS — M25.561 BILATERAL CHRONIC KNEE PAIN: ICD-10-CM

## 2019-05-13 DIAGNOSIS — R29.898 WEAKNESS OF LOWER EXTREMITY, UNSPECIFIED LATERALITY: ICD-10-CM

## 2019-05-13 DIAGNOSIS — R26.81 UNSTEADY GAIT: ICD-10-CM

## 2019-05-13 DIAGNOSIS — M79.672 BILATERAL FOOT PAIN: ICD-10-CM

## 2019-05-13 DIAGNOSIS — G89.29 BILATERAL CHRONIC KNEE PAIN: ICD-10-CM

## 2019-05-13 DIAGNOSIS — M79.671 BILATERAL FOOT PAIN: ICD-10-CM

## 2019-05-13 DIAGNOSIS — M25.562 BILATERAL CHRONIC KNEE PAIN: ICD-10-CM

## 2019-05-13 PROCEDURE — 97110 THERAPEUTIC EXERCISES: CPT | Mod: PN

## 2019-05-15 ENCOUNTER — CLINICAL SUPPORT (OUTPATIENT)
Dept: REHABILITATION | Facility: HOSPITAL | Age: 59
End: 2019-05-15
Attending: NEUROLOGICAL SURGERY
Payer: MEDICAID

## 2019-05-15 DIAGNOSIS — M25.561 BILATERAL CHRONIC KNEE PAIN: ICD-10-CM

## 2019-05-15 DIAGNOSIS — R26.81 UNSTEADY GAIT: ICD-10-CM

## 2019-05-15 DIAGNOSIS — M79.671 BILATERAL FOOT PAIN: ICD-10-CM

## 2019-05-15 DIAGNOSIS — M79.672 BILATERAL FOOT PAIN: ICD-10-CM

## 2019-05-15 DIAGNOSIS — G89.29 BILATERAL CHRONIC KNEE PAIN: ICD-10-CM

## 2019-05-15 DIAGNOSIS — M25.562 BILATERAL CHRONIC KNEE PAIN: ICD-10-CM

## 2019-05-15 DIAGNOSIS — R29.898 WEAKNESS OF LOWER EXTREMITY, UNSPECIFIED LATERALITY: ICD-10-CM

## 2019-05-15 PROCEDURE — 97110 THERAPEUTIC EXERCISES: CPT | Mod: PN

## 2019-05-15 NOTE — PROGRESS NOTES
"  Physical Therapy Daily Treatment Note     Name: Kylie Aleman  Clinic Number: [id number]    Therapy Diagnosis:   Encounter Diagnoses   Name Primary?    Bilateral chronic knee pain     Bilateral foot pain     Unsteady gait     Weakness of lower extremity, unspecified laterality      Physician: Mannie Hutchison MD    Visit Date: 5/15/2019    Physician Orders: PT Eval and Treat  Medical Diagnosis: pain in both lower extremities, gait instability  Evaluation Date: 5/9/19  Authorization Period Expiration: 4/22/2019  Plan of Care Certification Period: 8/9/2019  Visit #/Visits authorized: 3/ 3     Time In: 1355  Time Out: 1502  Total Billable Time: 60 minutes    Precautions: Standard    Subjective     Pt reports: Has been compliant with HEP. Feels that things are about the same    She was compliant with home exercise program.  Response to previous treatment: Muscle fatigue   Functional change: No change     Pain: 6/10  Location: bilateral feet  and knee joint     Objective   Knee Range of Motion:    Left Passive Right Passive   Flexion WNL WNL   Extension 19 23         Lower Extremity Strength  Right LE   Left LE     Knee extension: 4+/5 Knee extension: 4/5   Knee flexion: 4-/5 Knee flexion: 4-/5   Hip flexion: 4-/5 Hip flexion: 4/5   Hip Internal Rotation:  4/5    Hip Internal Rotation: 4/5      Hip External Rotation: 4/5    Hip External Rotation: 4/5      Hip extension:  4/5 Hip extension: 4/5   Hip abduction: 4-/5 Hip abduction: 4-/5   Hip adduction: 5/5 Hip adduction: 5/5   Ankle dorsiflexion: 5/5 Ankle dorsiflexion: 5/5   Ankle plantarflexion: 4/5 Ankle plantarflexion: 4/5      Pt cont to demonstrate pain with knee flex/ext exercises    Kylie received therapeutic exercises to develop strength and endurance for 55 minutes including:    -B HS stretch with strap 3x30"  -Quad sets BLE 20x5" holds with heel propped  -B LAQ 3x20x2" holds 2# weights  -B Marching 2x20 2# weights  Ankle pumps 2x20   -SAQ 2 x 20  2# " "weights  -Bridge x20  -HL hip add with ball 30 x 3" holds   -Hl hip abd RTB 30 x 3" holds  -SLR 2 x 10     Ambulating in // bars with CGA and BUE support with w/c follow 3x10'  Standing without UE support in // bars with CGA 3x10'        Kylie received hot pack for 0 minutes to .    Kylie received cold pack for 10 minutes to bilateral knees.      Home Exercises Provided and Patient Education Provided     Education provided:   - Continue with HEP as tolerated    Written Home Exercises Provided: Patient instructed to cont prior HEP.  Exercises were reviewed and Kylie was able to demonstrate them prior to the end of the session.  Kylie demonstrated good  understanding of the education provided.     See EMR under Patient Instructions for exercises provided prior visit.    Assessment   Pt was reassessed today d/t limited visits authorized by insurance. Patient improvements have been limited as she has only been seen for 3 visits thus far including her evaluation. Patient demonstrating improved gait toelrance. Patient demonstrating minor improvements in strength and ROM in BLE. Patient tolerated today's treatment session well. Minor cues required for proper technique with exercises. Staff progressed strengthening activities today with appropriate level of muscle fatigue achieved. CGA with sit<>stand and pivot transfer. Patient encouraged to continue with HEP outside of sessions for maximum benefit.   Kylie is progressing well towards her goals.   Pt prognosis is Fair.     Pt will continue to benefit from skilled outpatient physical therapy to address the deficits listed in the problem list box on initial evaluation, provide pt/family education and to maximize pt's level of independence in the home and community environment.     Pt's spiritual, cultural and educational needs considered and pt agreeable to plan of care and goals.     Anticipated barriers to physical therapy: LE weakness    Goals:     Short Term " Goals:  2-3 weeks  1.Report decreased in pain at worse less than <   / =  4  /10  to increase tolerance for functional activities.  2. Pt to improve B knee range of motion by 8 degrees to allow for improved functional mobility to allow for improvement in IADLs.   3. Increased B knee  MMT 1/2  to increase tolerance for ADL and work activities.  4. Pt to report an ability to perform stand pivot transfer into her sofa.  5. Pt to tolerate HEP to improve ROM and independence with ADL's     Long Term Goals:  6-8 weeks  1. Report decreased in pain at worse less than  <   / =  2  /10  to increase tolerance for functional mobility  2. Pt to improve B knee range of motion by 8 degrees to allow for improved functional mobility to allow for improvement in IADLs.    3. Increased B knee MMT 1 grade  to increase tolerance for ADL and work activities.  4. Pt will scores report at CJ level (20-40% impaired) on LEFS  to demonstrate increase in LE function with every day tasks.   5. Pt to be Independent with HEP to improve ROM and independence with ADL's        Plan     Plan of care Certification: 5/9/2019 to 8/9/2019.     Outpatient Physical Therapy 2 times weekly for 10 weeks to include the following interventions: Gait Training, Moist Heat/ Ice, Neuromuscular Re-ed, Patient Education, Therapeutic Activites and Therapeutic Exercise.        Jorge Cramer, PT

## 2019-05-24 ENCOUNTER — PROCEDURE VISIT (OUTPATIENT)
Dept: NEUROLOGY | Facility: CLINIC | Age: 59
End: 2019-05-24
Payer: MEDICAID

## 2019-05-24 VITALS — BODY MASS INDEX: 33.1 KG/M2 | HEIGHT: 62 IN | WEIGHT: 179.88 LBS

## 2019-05-24 DIAGNOSIS — M79.605 PAIN IN BOTH LOWER EXTREMITIES: ICD-10-CM

## 2019-05-24 DIAGNOSIS — M79.604 PAIN IN BOTH LOWER EXTREMITIES: ICD-10-CM

## 2019-05-24 DIAGNOSIS — R26.81 GAIT INSTABILITY: ICD-10-CM

## 2019-05-24 PROCEDURE — 95886 PR EMG COMPLETE, W/ NERVE CONDUCTION STUDIES, 5+ MUSCLES: ICD-10-PCS | Mod: 26,S$PBB,, | Performed by: NEUROLOGICAL SURGERY

## 2019-05-24 PROCEDURE — 95909 PR NERVE CONDUCTION STUDY; 5-6 STUDIES: ICD-10-PCS | Mod: 26,S$PBB,, | Performed by: NEUROLOGICAL SURGERY

## 2019-05-24 PROCEDURE — 95909 NRV CNDJ TST 5-6 STUDIES: CPT | Mod: PBBFAC,LT | Performed by: NEUROLOGICAL SURGERY

## 2019-05-24 PROCEDURE — 95886 MUSC TEST DONE W/N TEST COMP: CPT | Mod: PBBFAC | Performed by: NEUROLOGICAL SURGERY

## 2019-05-24 PROCEDURE — 95909 NRV CNDJ TST 5-6 STUDIES: CPT | Mod: 26,S$PBB,, | Performed by: NEUROLOGICAL SURGERY

## 2019-05-24 PROCEDURE — 95886 MUSC TEST DONE W/N TEST COMP: CPT | Mod: 26,S$PBB,, | Performed by: NEUROLOGICAL SURGERY

## 2019-05-26 NOTE — PROGRESS NOTES
Chief Complaint   Patient presents with    Peripheral Neuropathy        Kylie Aleman is a 59 y.o. female with a history of multiple medical diagnoses as listed below that presents for evaluation of a suspected peripheral neuropathy.  The patient has been having progressive difficulty with her walking, balance, and leg strength for the last several weeks.  Her symptoms seemed to get progressively worse with time without any specific warning or any provocation.  Symptoms are present almost daily and has not had any improvement nor any worsening with position, movement or level of activity.    PAST MEDICAL HISTORY:  Past Medical History:   Diagnosis Date    Depression     Encounter for blood transfusion     Hypertension        PAST SURGICAL HISTORY:  Past Surgical History:   Procedure Laterality Date    HYSTERECTOMY      TOTAL HIP ARTHROPLASTY         SOCIAL HISTORY:  Social History     Socioeconomic History    Marital status:      Spouse name: Not on file    Number of children: Not on file    Years of education: Not on file    Highest education level: Not on file   Occupational History    Not on file   Social Needs    Financial resource strain: Not on file    Food insecurity:     Worry: Not on file     Inability: Not on file    Transportation needs:     Medical: Not on file     Non-medical: Not on file   Tobacco Use    Smoking status: Former Smoker    Smokeless tobacco: Never Used   Substance and Sexual Activity    Alcohol use: No     Comment: QOD last drink on 7/21/2017    Drug use: No    Sexual activity: Never   Lifestyle    Physical activity:     Days per week: Not on file     Minutes per session: Not on file    Stress: Not on file   Relationships    Social connections:     Talks on phone: Not on file     Gets together: Not on file     Attends [employer] service: Not on file     Active member of club or organization: Not on file     Attends meetings of clubs or organizations: Not on  file     Relationship status: Not on file   Other Topics Concern    Not on file   Social History Narrative    Not on file       FAMILY HISTORY:  Family History   Problem Relation Age of Onset    Hypertension Mother     Hypertension Father     Heart disease Father     Hypertension Sister     Heart disease Sister     Heart disease Maternal Grandmother     Hypertension Maternal Grandmother     Heart disease Maternal Grandfather     Hypertension Maternal Grandfather     Heart disease Paternal Grandmother     Hypertension Paternal Grandmother     Heart disease Paternal Grandfather     Hypertension Paternal Grandfather        ALLERGIES AND MEDICATIONS: updated and reviewed.  Review of patient's allergies indicates:  No Known Allergies  Current Outpatient Medications   Medication Sig Dispense Refill    amLODIPine (NORVASC) 5 MG tablet Take 5 mg by mouth once daily.      escitalopram oxalate (LEXAPRO) 20 MG tablet Take 20 mg by mouth once daily.      gabapentin (NEURONTIN) 300 MG capsule Take 1 capsule (300 mg total) by mouth 3 (three) times daily. 90 capsule 11    hydrochlorothiazide (HYDRODIURIL) 25 MG tablet Take 1 tablet (25 mg total) by mouth once daily. 30 tablet 0    ibuprofen (ADVIL,MOTRIN) 800 MG tablet TAKE 1 TABLET BY MOUTH THREE TIMES DAILY 60 tablet 1    pregabalin (LYRICA) 75 MG capsule Take 1 capsule (75 mg total) by mouth 2 (two) times daily. 60 capsule 2     No current facility-administered medications for this visit.        Review of Systems   Constitutional: Negative for activity change, appetite change, fever and unexpected weight change.   HENT: Negative for trouble swallowing and voice change.    Eyes: Negative for photophobia and visual disturbance.   Respiratory: Negative for apnea and shortness of breath.    Cardiovascular: Negative for chest pain and leg swelling.   Gastrointestinal: Negative for constipation and nausea.   Genitourinary: Negative for difficulty urinating.    Musculoskeletal: Positive for back pain and gait problem. Negative for neck pain.   Skin: Negative for color change and pallor.   Neurological: Positive for weakness. Negative for dizziness, seizures, syncope and numbness.   Hematological: Negative for adenopathy.   Psychiatric/Behavioral: Negative for agitation, confusion and decreased concentration.       Neurologic Exam     Mental Status   Oriented to person, place, and time.   Registration: recalls 3 of 3 objects.   Attention: normal. Concentration: normal.   Speech: speech is normal   Level of consciousness: alert  Knowledge: good.     Cranial Nerves     CN II   Visual fields full to confrontation.   Right visual field deficit: none  Left visual field deficit: none     CN III, IV, VI   Pupils are equal, round, and reactive to light.  Extraocular motions are normal.   Right pupil: Size: 3 mm. Shape: regular. Accommodation: intact.   Left pupil: Size: 3 mm. Shape: regular. Accommodation: intact.   CN III: no CN III palsy  CN VI: no CN VI palsy  Nystagmus: none   Diplopia: none  Ophthalmoparesis: none  Upgaze: normal  Downgaze: normal  Conjugate gaze: present    CN V   Facial sensation intact.   Right facial sensation deficit: none  Left facial sensation deficit: none    CN VII   Facial expression full, symmetric.   Right facial weakness: none  Left facial weakness: none    CN VIII   CN VIII normal.     CN IX, X   CN IX normal.   CN X normal.   Palate: symmetric    CN XI   CN XI normal.   Right sternocleidomastoid strength: normal  Left sternocleidomastoid strength: normal  Right trapezius strength: normal  Left trapezius strength: normal    CN XII   CN XII normal.   Tongue deviation: none    Motor Exam   Muscle bulk: normal  Overall muscle tone: normal  Right arm tone: normal  Left arm tone: normal  Right leg tone: normal  Left leg tone: normal    Strength   Strength 5/5 throughout.     Sensory Exam   Right arm light touch: normal  Left arm light touch:  normal  Right leg light touch: decreased from knee  Left leg light touch: decreased from knee  Right arm vibration: normal  Left arm vibration: normal  Right leg vibration: decreased from toes  Left leg vibration: decreased from toes  Right arm proprioception: normal  Left arm proprioception: normal  Right leg proprioception: normal  Left leg proprioception: normal  Right arm pinprick: normal  Left arm pinprick: normal  Right leg pinprick: decreased from knee  Left leg pinprick: decreased from knee    Gait, Coordination, and Reflexes     Coordination   Finger to nose coordination: normal  Heel to shin coordination: abnormal    Tremor   Resting tremor: absent    Reflexes   Right brachioradialis: 1+  Left brachioradialis: 1+  Right biceps: 1+  Left biceps: 1+  Right triceps: 1+  Left triceps: 1+  Right patellar: 0  Left patellar: 0  Right achilles: 0  Left achilles: 0  Right plantar: equivocal  Left plantar: equivocal      Physical Exam   Constitutional: She is oriented to person, place, and time. She appears well-developed and well-nourished.   HENT:   Head: Normocephalic and atraumatic.   Eyes: Pupils are equal, round, and reactive to light. EOM are normal.   Neck: Normal range of motion.   Cardiovascular: Normal rate and intact distal pulses.   Pulmonary/Chest: Effort normal. No apnea. No respiratory distress.   Musculoskeletal: Normal range of motion.   Neurological: She is alert and oriented to person, place, and time. She has normal strength. She has an abnormal Heel to Shin Test. She has a normal Finger-Nose-Finger Test.   Reflex Scores:       Tricep reflexes are 1+ on the right side and 1+ on the left side.       Bicep reflexes are 1+ on the right side and 1+ on the left side.       Brachioradialis reflexes are 1+ on the right side and 1+ on the left side.       Patellar reflexes are 0 on the right side and 0 on the left side.       Achilles reflexes are 0 on the right side and 0 on the left side.  Skin: Skin  "is warm and dry.   Psychiatric: She has a normal mood and affect. Her speech is normal and behavior is normal. Thought content normal.   Vitals reviewed.      Vitals:    04/22/19 0849   BP: 133/85   BP Location: Left arm   Patient Position: Sitting   BP Method: Large (Automatic)   Pulse: 70   Weight: 81.6 kg (179 lb 14.3 oz)   Height: 5' 2" (1.575 m)       Assessment & Plan:    Problem List Items Addressed This Visit     None      Visit Diagnoses     Pain in both lower extremities    -  Primary    Relevant Medications    pregabalin (LYRICA) 75 MG capsule    Other Relevant Orders    EMG W/ ULTRASOUND AND NERVE CONDUCTION TEST 2 Extremities    Ambulatory Referral to Physical/Occupational Therapy    Gait instability        Relevant Orders    EMG W/ ULTRASOUND AND NERVE CONDUCTION TEST 2 Extremities    Ambulatory Referral to Physical/Occupational Therapy          Follow-up: Follow up for EMG/NCS.   More than 50% of this 45 minute encounter was spent in counseling and coordinating care of leg weakness      "

## 2019-06-05 ENCOUNTER — CLINICAL SUPPORT (OUTPATIENT)
Dept: REHABILITATION | Facility: HOSPITAL | Age: 59
End: 2019-06-05
Attending: NEUROLOGICAL SURGERY
Payer: MEDICARE

## 2019-06-05 DIAGNOSIS — M79.672 BILATERAL FOOT PAIN: ICD-10-CM

## 2019-06-05 DIAGNOSIS — R29.898 WEAKNESS OF LOWER EXTREMITY, UNSPECIFIED LATERALITY: ICD-10-CM

## 2019-06-05 DIAGNOSIS — M25.562 BILATERAL CHRONIC KNEE PAIN: ICD-10-CM

## 2019-06-05 DIAGNOSIS — M79.671 BILATERAL FOOT PAIN: ICD-10-CM

## 2019-06-05 DIAGNOSIS — G89.29 BILATERAL CHRONIC KNEE PAIN: ICD-10-CM

## 2019-06-05 DIAGNOSIS — R26.81 UNSTEADY GAIT: ICD-10-CM

## 2019-06-05 DIAGNOSIS — M25.561 BILATERAL CHRONIC KNEE PAIN: ICD-10-CM

## 2019-06-05 PROCEDURE — 97110 THERAPEUTIC EXERCISES: CPT | Mod: PN

## 2019-06-05 PROCEDURE — 97116 GAIT TRAINING THERAPY: CPT | Mod: PN

## 2019-06-05 NOTE — PROGRESS NOTES
"  Physical Therapy Daily Treatment Note     Name: Kylie Aleman  Clinic Number: [id number]    Therapy Diagnosis:   Encounter Diagnoses   Name Primary?    Bilateral chronic knee pain     Bilateral foot pain     Unsteady gait     Weakness of lower extremity, unspecified laterality      Physician: Mannie Hutchison MD    Visit Date: 6/5/2019    Physician Orders: PT Eval and Treat  Medical Diagnosis: pain in both lower extremities, gait instability  Evaluation Date: 5/9/19  Authorization Period Expiration: 4/22/2019  Plan of Care Certification Period: 8/9/2019  Visit #/Visits authorized: 1/ 4 (New Referral)   Total Visits: 4    Time In: 1250  Time Out: 1348  Total Billable Time: 40 minutes    Precautions: Standard    Subjective     Pt reports: Has been able to be more mobile at home, feels things are improving.     She was compliant with home exercise program.  Response to previous treatment: Muscle fatigue   Functional change: Has been able to complete laundry on her own at home    Pain: 6/10  Location: bilateral feet  and knee joint     Objective     Pt cont to demonstrate pain with knee flex/ext exercises    Kylie received therapeutic exercises to develop strength and endurance for 28 minutes including:    -B HS stretch with strap 3x30"  -Quad sets BLE 20x5" holds with heel propped  -B LAQ 3x20x2" holds 2# weights  -B Marching 2x20 2# weights  Ankle pumps 2x20   -SAQ 2 x 20  2# weights  -Bridge x20  -HL hip add with ball 30 x 3" holds   -Hl hip abd RTB 30 x 3" holds  -SLR 2 x 10      +Mini squats //bars (limited range) 2 x 10   +Calf raise x 10       Kylie participated in gait training for 30 minutes including:   Ambulating in // bars with CGA and BUE support with w/c follow 5 x 10'   *Cues for hand positioning when transferring    Standing with 1 UE support in // bars with CGA 3 x 10'        Kylie received hot pack for 0 minutes to .    Kylie received cold pack for 10 minutes to bilateral " knees.      Home Exercises Provided and Patient Education Provided     Education provided:   - Continue with HEP as tolerated    Written Home Exercises Provided: Patient instructed to cont prior HEP.  Exercises were reviewed and Kylie was able to demonstrate them prior to the end of the session.  Kylie demonstrated good  understanding of the education provided.     See EMR under Patient Instructions for exercises provided prior visit.    Assessment     Patient tolerated ambulating more distance in parallel bars today. Next session would benefit from trial of ambulation with turns in parallel bars and trial standing activities with FWW for support. Continues to progress in therapies and benefit from skilled sessions for progression of transfers and ambulation.     Kylie is progressing well towards her goals.   Pt prognosis is Fair.     Pt will continue to benefit from skilled outpatient physical therapy to address the deficits listed in the problem list box on initial evaluation, provide pt/family education and to maximize pt's level of independence in the home and community environment.     Pt's spiritual, cultural and educational needs considered and pt agreeable to plan of care and goals.     Anticipated barriers to physical therapy: LE weakness    Goals:     Short Term Goals:  2-3 weeks  1.Report decreased in pain at worse less than <   / =  4  /10  to increase tolerance for functional activities.  2. Pt to improve B knee range of motion by 8 degrees to allow for improved functional mobility to allow for improvement in IADLs.   3. Increased B knee  MMT 1/2  to increase tolerance for ADL and work activities.  4. Pt to report an ability to perform stand pivot transfer into her sofa.  5. Pt to tolerate HEP to improve ROM and independence with ADL's     Long Term Goals:  6-8 weeks  1. Report decreased in pain at worse less than  <   / =  2  /10  to increase tolerance for functional mobility  2. Pt to improve B  knee range of motion by 8 degrees to allow for improved functional mobility to allow for improvement in IADLs.    3. Increased B knee MMT 1 grade  to increase tolerance for ADL and work activities.  4. Pt will scores report at CJ level (20-40% impaired) on LEFS  to demonstrate increase in LE function with every day tasks.   5. Pt to be Independent with HEP to improve ROM and independence with ADL's        Plan     Plan of care Certification: 5/9/2019 to 8/9/2019.     Outpatient Physical Therapy 2 times weekly for 10 weeks to include the following interventions: Gait Training, Moist Heat/ Ice, Neuromuscular Re-ed, Patient Education, Therapeutic Activites and Therapeutic Exercise.        Cy Broussard, PTA

## 2019-06-07 ENCOUNTER — CLINICAL SUPPORT (OUTPATIENT)
Dept: REHABILITATION | Facility: HOSPITAL | Age: 59
End: 2019-06-07
Attending: NEUROLOGICAL SURGERY
Payer: MEDICARE

## 2019-06-07 DIAGNOSIS — R26.81 UNSTEADY GAIT: ICD-10-CM

## 2019-06-07 DIAGNOSIS — M25.561 BILATERAL CHRONIC KNEE PAIN: ICD-10-CM

## 2019-06-07 DIAGNOSIS — M79.671 BILATERAL FOOT PAIN: ICD-10-CM

## 2019-06-07 DIAGNOSIS — R29.898 WEAKNESS OF LOWER EXTREMITY, UNSPECIFIED LATERALITY: ICD-10-CM

## 2019-06-07 DIAGNOSIS — M25.562 BILATERAL CHRONIC KNEE PAIN: ICD-10-CM

## 2019-06-07 DIAGNOSIS — M79.672 BILATERAL FOOT PAIN: ICD-10-CM

## 2019-06-07 DIAGNOSIS — G89.29 BILATERAL CHRONIC KNEE PAIN: ICD-10-CM

## 2019-06-07 PROCEDURE — 97110 THERAPEUTIC EXERCISES: CPT | Mod: PN

## 2019-06-07 PROCEDURE — 97116 GAIT TRAINING THERAPY: CPT | Mod: PN

## 2019-06-07 NOTE — PROGRESS NOTES
"  Physical Therapy Daily Treatment Note     Name: Kylie Aleman  Clinic Number: [id number]    Therapy Diagnosis:   Encounter Diagnoses   Name Primary?    Bilateral chronic knee pain     Bilateral foot pain     Unsteady gait     Weakness of lower extremity, unspecified laterality      Physician: Mannie Hutchison MD    Visit Date: 6/7/2019    Physician Orders: PT Eval and Treat  Medical Diagnosis: pain in both lower extremities, gait instability  Evaluation Date: 5/9/19  Authorization Period Expiration: 4/22/2019  Plan of Care Certification Period: 8/9/2019  Visit #/Visits authorized: 2/ 4 (New Referral)   Total Visits: 5    Time In: 1500  Time Out: 1600  Total Billable Time: 60 minutes    Precautions: Standard    Subjective     Pt reports: Had some pain after last session but this resolved within one day. Feels that pain has slowly been getting better.      She was compliant with home exercise program.  Response to previous treatment: Muscle fatigue   Functional change: Has been able to complete laundry on her own at home    Pain: 6/10  Location: bilateral feet  and knee joint     Objective     Pt cont to demonstrate pain with knee flex/ext exercises    Kylie received therapeutic exercises to develop strength and endurance for 35 minutes including:    +Nu Step x 8 mins     -B HS stretch with strap 3x30"  -Quad sets BLE 20x5" holds with heel propped  -B LAQ 3x20x2" holds 2# weights  -B Marching 2x20 2# weights  Ankle pumps 2x20   -SAQ 2 x 20  2# weights  -Bridge 3 x 10  -HL hip add with ball 30 x 3" holds   -Hl hip abd RTB 30 x 3" holds  -SLR 3 x 10    +Clamshell x 30     Mini squats //bars (limited range) 2 x 10   Calf raise x 10       Kylie participated in gait training for 25 minutes including:     Ambulating in // bars with CGA and BUE support with w/c follow 5 x 10'   *Cues for hand positioning when transferring    Ambulating with FWW, WC to follow for rest breaks as needed.   Sit<>stand with CGA. " Ambulated a distance of 12 ft, 20 ft, and 18 ft  Gait belt used for assist as needed.     Standing with 1 UE support in // bars with CGA 3 x 10'        Kylie received hot pack for 0 minutes to .    Kylie received cold pack for 10 minutes to bilateral knees.      Home Exercises Provided and Patient Education Provided     Education provided:   - Continue with HEP as tolerated    Written Home Exercises Provided: Patient instructed to cont prior HEP.  Exercises were reviewed and Kylie was able to demonstrate them prior to the end of the session.  Kylie demonstrated good  understanding of the education provided.     See EMR under Patient Instructions for exercises provided prior visit.    Assessment     Progressed to use of FWW for ambulation today. Patient demonstrated good carry over of sequencing for safe sit<>stand transfers. Minor cues for upright posture when ambulating to reduce pressure through UEs. Patient continues to progress in therapies and benefit from skilled therapies for progression of LE strength and improved gait.     Kylie is progressing well towards her goals.   Pt prognosis is Fair.     Pt will continue to benefit from skilled outpatient physical therapy to address the deficits listed in the problem list box on initial evaluation, provide pt/family education and to maximize pt's level of independence in the home and community environment.     Pt's spiritual, cultural and educational needs considered and pt agreeable to plan of care and goals.     Anticipated barriers to physical therapy: LE weakness    Goals:     Short Term Goals:  2-3 weeks  1.Report decreased in pain at worse less than <   / =  4  /10  to increase tolerance for functional activities.  2. Pt to improve B knee range of motion by 8 degrees to allow for improved functional mobility to allow for improvement in IADLs.   3. Increased B knee  MMT 1/2  to increase tolerance for ADL and work activities.  4. Pt to report an  ability to perform stand pivot transfer into her sofa.  5. Pt to tolerate HEP to improve ROM and independence with ADL's     Long Term Goals:  6-8 weeks  1. Report decreased in pain at worse less than  <   / =  2  /10  to increase tolerance for functional mobility  2. Pt to improve B knee range of motion by 8 degrees to allow for improved functional mobility to allow for improvement in IADLs.    3. Increased B knee MMT 1 grade  to increase tolerance for ADL and work activities.  4. Pt will scores report at CJ level (20-40% impaired) on LEFS  to demonstrate increase in LE function with every day tasks.   5. Pt to be Independent with HEP to improve ROM and independence with ADL's        Plan     Plan of care Certification: 5/9/2019 to 8/9/2019.     Outpatient Physical Therapy 2 times weekly for 10 weeks to include the following interventions: Gait Training, Moist Heat/ Ice, Neuromuscular Re-ed, Patient Education, Therapeutic Activites and Therapeutic Exercise.        Cy Broussard, PTA

## 2019-06-11 NOTE — PROGRESS NOTES
"  Physical Therapy Daily Treatment Note     Name: Kylie Aleman  Clinic Number: [id number]    Therapy Diagnosis:   Encounter Diagnoses   Name Primary?    Bilateral chronic knee pain     Bilateral foot pain     Unsteady gait     Weakness of lower extremity, unspecified laterality      Physician: Mannie Hutchison MD    Visit Date: 6/12/2019    Physician Orders: PT Eval and Treat  Medical Diagnosis: pain in both lower extremities, gait instability  Evaluation Date: 5/9/19  Authorization Period Expiration: 4/22/2019  Plan of Care Certification Period: 8/9/2019  Visit #/Visits authorized: 3/4 (New Referral)   Total Visits: 6    Time In: 0842  Time Out: 0952  Total Billable Time: 60 minutes    Precautions: Standard    Subjective     Pt reports: Had some soreness after last session and feels it was due to the delay between appointments. She reports it lasted a couple days.     She was compliant with home exercise program.  Response to previous treatment: Muscle fatigue   Functional change: Has been able to complete laundry on her own at home    Pain: 7/10  Location: bilateral feet  and knee joint     Objective     Pt cont to demonstrate pain with knee flex/ext exercises    Knee Range of Motion:    Left Passive Right Passive   Flexion WNL WNL   Extension -14 -17         Lower Extremity Strength          Right LE   Left LE     Knee extension: 4+/5 Knee extension: 4+/5   Knee flexion: 4/5 Knee flexion: 4/5   Hip flexion: 4/5 Hip flexion: 4/5   Hip Internal Rotation:  4/5    Hip Internal Rotation: 4/5      Hip External Rotation: 4/5    Hip External Rotation: 4/5      Hip extension:  4/5 Hip extension: 4/5   Hip abduction: 4/5 Hip abduction: 4/5                            Kylie received therapeutic exercises to develop strength and endurance for 25 minutes including:    Nu Step x 8 mins     -B HS stretch with strap 3x30"  -Quad sets BLE 20x5" holds with heel propped  -B LAQ 2x20x3" holds 3# weights  -B Marching 2x20 3# " "weights  Ankle pumps 2x20   -SAQ 2 x 20  2# weights  +Seated LAQ  With 2# weights 2 x 20   -Bridge 3 x 10  -HL hip add with ball 30 x 3" holds   -Hl hip abd RTB 30 x 3" holds  -SLR 3 x 10  NP d/t time  -Clamshell x 30 NP d/t time    Mini squats //bars (limited range) 2 x 10   Calf raise x 10       Kylie participated in gait training for 35 minutes including:     Ambulating in // bars with CGA and BUE support with w/c follow 5 x 10'   *Cues for hand positioning when transferring    Ambulating with FWW, WC to follow for rest breaks as needed.   Sit<>stand with CGA. Ambulated a distance of 26 ft, 30 ft, and 60 ft  Gait belt used for assist as needed.     Standing with 1' without UE support with CGA 3 x 1'      Kylie received hot pack for 0 minutes to .    Kylie received cold pack for 10 minutes to bilateral knees.      Home Exercises Provided and Patient Education Provided     Education provided:   - Continue with HEP as tolerated    Written Home Exercises Provided: Patient instructed to cont prior HEP.  Exercises were reviewed and Kylie was able to demonstrate them prior to the end of the session.  Kylie demonstrated good  understanding of the education provided.     See EMR under Patient Instructions for exercises provided prior visit.    Assessment     Pt was seen for reassessment again today d/t limited visits authorized by insurance. Patient has been seen for 5 visits now. She has met 3 STGs. She demonstrates excellent improvement for ambulation and standing tolerance today.  She has improved B knee MMT and ROM but is still limited.  Patient tolerated today's treatment session very well. Min VC for proper exercises performance with good carryover. Gait training with min VC for upright posture and step length with brief rest breaks due to fatigue and stiffness. Improved standing tolerance achieved with ability to perform 1' with CGA and without UE support in a single trial, but required intermittent // " "bar use for balance int the other trials. Continued CGA with sit<>stand and pivot transfers. Appropriate fatigue achieved at end of session. She receives CP to B knees at end of session reporting "feeling better" at exit. Patient will benefit from continued skilled therapy to meet remaining goals.       Kylie is progressing well towards her goals.   Pt prognosis is Fair.     Pt will continue to benefit from skilled outpatient physical therapy to address the deficits listed in the problem list box on initial evaluation, provide pt/family education and to maximize pt's level of independence in the home and community environment.     Pt's spiritual, cultural and educational needs considered and pt agreeable to plan of care and goals.     Anticipated barriers to physical therapy: LE weakness    Goals:     Short Term Goals:  2-3 weeks; updated 06/12/19  1.Report decreased in pain at worst less than <   / =  4  /10  to increase tolerance for functional activities. Ongoing reports 7/10 at worst  2. Pt to improve B knee range of motion by 8 degrees to allow for improved functional mobility to allow for improvement in IADLs. PROGRESSING  3. Increased B knee  MMT 1/2  to increase tolerance for ADL and work activities. MET  4. Pt to report an ability to perform stand pivot transfer into her sofa. MET   5. Pt to tolerate HEP to improve ROM and independence with ADL's. MET     Long Term Goals:  6-8 weeks  1. Report decreased in pain at worse less than  <   / =  2  /10  to increase tolerance for functional mobility  2. Pt to improve B knee range of motion by 8 degrees to allow for improved functional mobility to allow for improvement in IADLs.    3. Increased B knee MMT 1 grade  to increase tolerance for ADL and work activities.  4. Pt will scores report at CJ level (20-40% impaired) on LEFS  to demonstrate increase in LE function with every day tasks.   5. Pt to be Independent with HEP to improve ROM and independence with " ADL's        Plan     Plan of care Certification: 5/9/2019 to 8/9/2019.     Outpatient Physical Therapy 2 times weekly for 10 weeks to include the following interventions: Gait Training, Moist Heat/ Ice, Neuromuscular Re-ed, Patient Education, Therapeutic Activites and Therapeutic Exercise.        Thomas Lund, PT

## 2019-06-12 ENCOUNTER — CLINICAL SUPPORT (OUTPATIENT)
Dept: REHABILITATION | Facility: HOSPITAL | Age: 59
End: 2019-06-12
Attending: NEUROLOGICAL SURGERY
Payer: MEDICARE

## 2019-06-12 DIAGNOSIS — M79.671 BILATERAL FOOT PAIN: ICD-10-CM

## 2019-06-12 DIAGNOSIS — M25.562 BILATERAL CHRONIC KNEE PAIN: ICD-10-CM

## 2019-06-12 DIAGNOSIS — R29.898 WEAKNESS OF LOWER EXTREMITY, UNSPECIFIED LATERALITY: ICD-10-CM

## 2019-06-12 DIAGNOSIS — R26.81 UNSTEADY GAIT: ICD-10-CM

## 2019-06-12 DIAGNOSIS — M79.672 BILATERAL FOOT PAIN: ICD-10-CM

## 2019-06-12 DIAGNOSIS — G89.29 BILATERAL CHRONIC KNEE PAIN: ICD-10-CM

## 2019-06-12 DIAGNOSIS — M25.561 BILATERAL CHRONIC KNEE PAIN: ICD-10-CM

## 2019-06-12 PROCEDURE — 97110 THERAPEUTIC EXERCISES: CPT | Mod: PN

## 2019-06-12 PROCEDURE — 97116 GAIT TRAINING THERAPY: CPT | Mod: PN

## 2019-06-24 ENCOUNTER — DOCUMENTATION ONLY (OUTPATIENT)
Dept: REHABILITATION | Facility: HOSPITAL | Age: 59
End: 2019-06-24

## 2019-06-24 NOTE — PROGRESS NOTES
Physical Therapy: No show/Cancellation of Visit  Date: 06/24/2019    Patient was a no show to today's PT appointment.  spoke with pt who stated she is in pain in her foot today. Patient's next scheduled appointment is 6/26/2019.     Cancel: 0  No show: 1    Therapist: Jose R Baker, PT

## 2019-06-25 NOTE — PROGRESS NOTES
"  Physical Therapy Daily Treatment Note     Name: Kylie Aleman  Clinic Number: [id number]    Therapy Diagnosis:   Encounter Diagnoses   Name Primary?    Bilateral chronic knee pain     Bilateral foot pain     Unsteady gait     Weakness of lower extremity, unspecified laterality      Physician: Mannie Hutchison MD    Visit Date: 6/26/2019    Physician Orders: PT Eval and Treat  Medical Diagnosis: pain in both lower extremities, gait instability  Evaluation Date: 5/9/19  Authorization Period Expiration: 4/22/2019  Plan of Care Certification Period: 8/9/2019  Visit #/Visits authorized: 4/4 (New Referral)   Total Visits: 7    Time In: 858  Time Out: 943  Total Billable Time: 45 minutes    Precautions: Standard    Subjective     Pt reports: Felt good after session she is doing well today. She wants to keep coming because she feels like she is close to being able to walk.    She was compliant with home exercise program.  Response to previous treatment: Muscle fatigue   Functional change: Has been able to complete laundry on her own at home    Pain: 2/10  Location: bilateral feet      Objective     BOLD = performed today  + = new exercise or exercise progression    Kylie received therapeutic exercises to develop strength and endurance for 25 minutes including:    Nu Step x 8 mins   +UBE for 6 mins (3 fwd, 3 back) for increased CV endurance, lv 1.5    B HS stretch with strap 3x30"  Quad sets BLE 20x5" holds with heel propped  B Marching 2x20 3# weights  Ankle pumps 2x20   SAQ 2 x 20  2# weights  Seated LAQ  With 2# weights 2 x 20   Bridge 3 x 10  HL hip add with ball 30 x 3" holds   Hl hip abd RTB 30 x 3" holds  SLR x30 ea with 2# weight donned   Clamshell x 30 NP d/t time    Mini squats //bars (limited range) 2 x 10   Calf raise x 10       Kylie participated in gait training for 20 minutes including:     Ambulating in // bars with CGA and BUE support with w/c follow 5 x 10'   *Cues for hand positioning when " transferring    Ambulating with FWW, WC to follow for rest breaks as needed.   Sit<>stand with supervision. Ambulated a distance of 36 ft, 38 ft. Close supervision for ambulation, pt demonstrates short reciprocal steps and relies heavily on UEs for support  Gait belt donned for safety.     Standing for 1:30 minute with UE support on RW      Kylie received hot pack for 0 minutes to .    Kylie received cold pack for 10 minutes to bilateral knees.      Home Exercises Provided and Patient Education Provided     Education provided:   - Continue with HEP as tolerated    Written Home Exercises Provided: Patient instructed to cont prior HEP.  Exercises were reviewed and Kylie was able to demonstrate them prior to the end of the session.  Kylie demonstrated good  understanding of the education provided.     See EMR under Patient Instructions for exercises provided prior visit.    Assessment     Pt continues to do well with skilled PT services. She again able to increase standing tolerance time. Pt requires supervision only for ambulation but is limited due to her heavily reliance on UEs. Pt continues to demonstrate good potential to improve with ambulation and standing endurance. She tolerates exercise well and remains appropriate for skilled PT services.      Kylie is progressing well towards her goals.   Pt prognosis is Fair.     Pt will continue to benefit from skilled outpatient physical therapy to address the deficits listed in the problem list box on initial evaluation, provide pt/family education and to maximize pt's level of independence in the home and community environment.     Pt's spiritual, cultural and educational needs considered and pt agreeable to plan of care and goals.     Anticipated barriers to physical therapy: LE weakness    Goals:     Short Term Goals:  2-3 weeks; updated 06/12/19  1.Report decreased in pain at worst less than <   / =  4  /10  to increase tolerance for functional activities.  Ongoing reports 7/10 at worst  2. Pt to improve B knee range of motion by 8 degrees to allow for improved functional mobility to allow for improvement in IADLs. PROGRESSING  3. Increased B knee  MMT 1/2  to increase tolerance for ADL and work activities. MET  4. Pt to report an ability to perform stand pivot transfer into her sofa. MET   5. Pt to tolerate HEP to improve ROM and independence with ADL's. MET     Long Term Goals:  6-8 weeks  1. Report decreased in pain at worse less than  <   / =  2  /10  to increase tolerance for functional mobility  2. Pt to improve B knee range of motion by 8 degrees to allow for improved functional mobility to allow for improvement in IADLs.    3. Increased B knee MMT 1 grade  to increase tolerance for ADL and work activities.  4. Pt will scores report at CJ level (20-40% impaired) on LEFS  to demonstrate increase in LE function with every day tasks.   5. Pt to be Independent with HEP to improve ROM and independence with ADL's        Plan     Plan of care Certification: 5/9/2019 to 8/9/2019.     Outpatient Physical Therapy 2 times weekly for 10 weeks to include the following interventions: Gait Training, Moist Heat/ Ice, Neuromuscular Re-ed, Patient Education, Therapeutic Activites and Therapeutic Exercise.        Jose R Baker, PT

## 2019-06-26 ENCOUNTER — CLINICAL SUPPORT (OUTPATIENT)
Dept: REHABILITATION | Facility: HOSPITAL | Age: 59
End: 2019-06-26
Attending: NEUROLOGICAL SURGERY
Payer: MEDICARE

## 2019-06-26 DIAGNOSIS — M25.561 BILATERAL CHRONIC KNEE PAIN: ICD-10-CM

## 2019-06-26 DIAGNOSIS — R26.81 UNSTEADY GAIT: ICD-10-CM

## 2019-06-26 DIAGNOSIS — G89.29 BILATERAL CHRONIC KNEE PAIN: ICD-10-CM

## 2019-06-26 DIAGNOSIS — M25.562 BILATERAL CHRONIC KNEE PAIN: ICD-10-CM

## 2019-06-26 DIAGNOSIS — M79.671 BILATERAL FOOT PAIN: ICD-10-CM

## 2019-06-26 DIAGNOSIS — M79.672 BILATERAL FOOT PAIN: ICD-10-CM

## 2019-06-26 DIAGNOSIS — R29.898 WEAKNESS OF LOWER EXTREMITY, UNSPECIFIED LATERALITY: ICD-10-CM

## 2019-06-26 PROCEDURE — 97110 THERAPEUTIC EXERCISES: CPT | Mod: PN

## 2019-07-01 ENCOUNTER — DOCUMENTATION ONLY (OUTPATIENT)
Dept: REHABILITATION | Facility: HOSPITAL | Age: 59
End: 2019-07-01

## 2019-07-01 NOTE — PROGRESS NOTES
Physical Therapy: No show/Cancellation of Visit  Date: 07/01/2019    Patient cancelled today's PT appointment. Reason for cancellation: insurance authorization. Patient's next scheduled appointment is 7/3/2019.     Cancel: 1  No show: 1    Therapist: Jose R Baker, PT

## 2019-07-03 ENCOUNTER — DOCUMENTATION ONLY (OUTPATIENT)
Dept: REHABILITATION | Facility: HOSPITAL | Age: 59
End: 2019-07-03

## 2019-07-03 NOTE — PROGRESS NOTES
Physical Therapy: No show/Cancellation of Visit  Date: 07/03/2019    Patient was a no show to today's PT appointment. Patient's next scheduled appointment is 7/8/2019.       Cancel: 1  No show: 2    Therapist: Jose R Baker, PT

## 2019-07-08 ENCOUNTER — DOCUMENTATION ONLY (OUTPATIENT)
Dept: REHABILITATION | Facility: HOSPITAL | Age: 59
End: 2019-07-08

## 2019-07-08 NOTE — PROGRESS NOTES
Physical Therapy: No show/Cancellation of Visit  Date: 07/08/2019    Patient cancelled today's PT appointment. Reason for cancellation: authorization pending. Patient's next scheduled appointment is 7/10/2019.     Cancel: 2  No show: 2    Therapist: Jose R Baker, PT

## 2019-07-10 ENCOUNTER — DOCUMENTATION ONLY (OUTPATIENT)
Dept: REHABILITATION | Facility: HOSPITAL | Age: 59
End: 2019-07-10

## 2019-07-10 NOTE — PROGRESS NOTES
Physical Therapy: No show/Cancellation of Visit  Date: 07/10/2019    Patient cancelled today's PT appointment. Reason for cancellation: weather. Patient's next scheduled appointment is 7/17/2019.     Cancel: 3  No show: 2    Therapist: Jose R Baker, PT

## 2019-07-17 ENCOUNTER — CLINICAL SUPPORT (OUTPATIENT)
Dept: REHABILITATION | Facility: HOSPITAL | Age: 59
End: 2019-07-17
Attending: NEUROLOGICAL SURGERY
Payer: MEDICARE

## 2019-07-17 DIAGNOSIS — M25.561 BILATERAL CHRONIC KNEE PAIN: ICD-10-CM

## 2019-07-17 DIAGNOSIS — M79.672 BILATERAL FOOT PAIN: Primary | ICD-10-CM

## 2019-07-17 DIAGNOSIS — M25.562 BILATERAL CHRONIC KNEE PAIN: ICD-10-CM

## 2019-07-17 DIAGNOSIS — R26.81 UNSTEADY GAIT: ICD-10-CM

## 2019-07-17 DIAGNOSIS — M79.671 BILATERAL FOOT PAIN: Primary | ICD-10-CM

## 2019-07-17 DIAGNOSIS — R29.898 WEAKNESS OF LOWER EXTREMITY, UNSPECIFIED LATERALITY: ICD-10-CM

## 2019-07-17 DIAGNOSIS — G89.29 BILATERAL CHRONIC KNEE PAIN: ICD-10-CM

## 2019-07-17 PROCEDURE — G8978 MOBILITY CURRENT STATUS: HCPCS | Mod: CL,PN

## 2019-07-17 PROCEDURE — 97110 THERAPEUTIC EXERCISES: CPT | Mod: PN

## 2019-07-17 PROCEDURE — 97116 GAIT TRAINING THERAPY: CPT | Mod: PN

## 2019-07-17 PROCEDURE — G8979 MOBILITY GOAL STATUS: HCPCS | Mod: CK,PN

## 2019-07-17 NOTE — PLAN OF CARE
"  Physical Therapy Daily Treatment Note/ Progress Note     Name: Kylie Aleman  Clinic Number: [id number]    Therapy Diagnosis:   Encounter Diagnoses   Name Primary?    Bilateral chronic knee pain     Bilateral foot pain Yes    Unsteady gait     Weakness of lower extremity, unspecified laterality      Physician: Mannie Hutchison MD    Visit Date: 7/17/2019    Physician Orders: PT Eval and Treat  Medical Diagnosis: pain in both lower extremities, gait instability  Evaluation Date: 5/9/19  Last PN: 7/17/2019  Authorization Period Expiration: 4/22/2019  New Plan of Care Certification Period: 7/17/2019 - 10/9/2019  Visit #/Visits authorized: 1/12 (New Referral)   Total Visits: 8    Time In: 730  Time Out: 815  Total Billable Time: 45 minutes    Precautions: Standard    Subjective     Pt reports: She has not been able to make it in due to insurance issues. She has walked some at home.     She was compliant with home exercise program.  Response to previous treatment: Muscle fatigue   Functional change: Has been able to complete laundry on her own at home    Pain: 7/10  Location: bilateral feet      Objective     BOLD = performed today  + = new exercise or exercise progression    Kylie received therapeutic exercises to develop strength and endurance for 30 minutes including:    Nu Step x 8 mins, lv 1.0   UBE for 6 mins (3 fwd, 3 back) for increased CV endurance, lv 1.5    B HS stretch with strap 3x30"  Quad sets BLE 20x5" holds with heel propped  B Marching 2x20 3# weights  Ankle pumps 2x20   SAQ 2 x 20  2# weights  Seated LAQ  With 2# weights 2 x 20   Bridge 3 x 10  HL hip add with ball 30 x 3" holds   Hl hip abd RTB 30 x 3" holds  SLR x30 ea with 2# weight donned   Clamshell x 30  x10 sit to stand from EOM with PT education on using one hand on surface she is sitting on instead of B on walker    Mini squats //bars (limited range) 2 x 10   Calf raise x 10       Knee Range of Motion:    Left Passive Right Passive "   Flexion WNL WNL   Extension -8 -13         Lower Extremity Strength                Right LE   Left LE     Knee extension: 4+/5 Knee extension: 4+/5   Knee flexion: 4+/5 Knee flexion: 4+/5   Hip flexion: 4+/5 Hip flexion: 4+/5   Hip Internal Rotation:  4/5    Hip Internal Rotation: 4/5      Hip External Rotation: 4/5    Hip External Rotation: 4/5      Hip extension:  4/5 Hip extension: 4-/5   Hip abduction: 4/5 Hip abduction: 4-/5                                     Kylie participated in gait training for 15 minutes including:   Ambulating with FWW  Sit<>stand with supervision. Ambulated a distance of 20 ft (for TUG), and ~40 ft. Close supervision for ambulation, pt demonstrates short reciprocal steps and relies heavily on UEs for support  Gait belt donned for safety.       TU:57 minutes      CMS Impairment/Limitation/Restriction for FOTO Status Survey    Therapist reviewed FOTO scores for Kylie Aleman on 2019.   FOTO documents entered into [employer] - see Media section.    Limitation Score: 65%  Category: Mobility    Current : CL = least 60% but < 80% impaired, limited or restricted  Goal: CK = at least 40% but < 60% impaired, limited or restricted             Kylie received hot pack for 0 minutes to .    Kylie received cold pack for 10 minutes to bilateral knees.      Home Exercises Provided and Patient Education Provided     Education provided:   - Continue with HEP as tolerated    Written Home Exercises Provided: Patient instructed to cont prior HEP.  Exercises were reviewed and Kylei was able to demonstrate them prior to the end of the session.  Kylie demonstrated good  understanding of the education provided.     See EMR under Patient Instructions for exercises provided prior visit.    Assessment     Assessment period 2019-2019: Despite inconsistent attendance to PT sessions, Ms. Espinal is making slow but steady progress in skilled PT services. She demonstrates improvement in knee  ROM and reports ability to ambulate more between sessions. Pt requires frequent rest breaks due to her decreased endurance and presents as a significant fall risk due to her score on TUG. Pt's strength is improving. She remains appropriate for skilled PT services and PT to extend POC until 10/9/2019 in order to increase her overall mobility and decrease her dependence on wc for mobility. Pt's goals have been addressed and updated; her remaining goals remain appropriate.       Kylie is progressing well towards her goals.   Pt prognosis is Fair.     Pt will continue to benefit from skilled outpatient physical therapy to address the deficits listed in the problem list box on initial evaluation, provide pt/family education and to maximize pt's level of independence in the home and community environment.     Pt's spiritual, cultural and educational needs considered and pt agreeable to plan of care and goals.     Anticipated barriers to physical therapy: LE weakness    Goals:     Short Term Goals:  2-3 weeks; updated 06/12/19  1.Report decreased in pain at worst less than <   / =  4  /10  to increase tolerance for functional activities. Ongoing reports 7/10 at worst  2. Pt to improve B knee range of motion by 8 degrees to allow for improved functional mobility to allow for improvement in IADLs. MET  3. Increased B knee  MMT 1/2  to increase tolerance for ADL and work activities. MET  4. Pt to report an ability to perform stand pivot transfer into her sofa. MET   5. Pt to tolerate HEP to improve ROM and independence with ADL's. MET     Long Term Goals:  6-8 weeks  1. Report decreased in pain at worse less than  <   / =  2  /10  to increase tolerance for functional mobility. ongoing  2. Pt to improve B knee range of motion by 8 degrees to allow for improved functional mobility to allow for improvement in IADLs.  MET  3. Increased B knee MMT 1 grade  to increase tolerance for ADL and work activities. progressing  4. Pt  will scores report at CJ level (20-40% impaired) on LEFS  to demonstrate increase in LE function with every day tasks. progressing  5. Pt to be Independent with HEP to improve ROM and independence with ADL's. MET        Plan     New Plan of care Certification: 5/9/2019 to 10/9/2019.     Continue with Outpatient Physical Therapy 2 times weekly for an additional 8 weeks to include the following interventions: Gait Training, Moist Heat/ Ice, Neuromuscular Re-ed, Patient Education, Therapeutic Activites and Therapeutic Exercise. Focus on functional mobility and ambulation.        Jose R Baker, PT

## 2019-07-19 DIAGNOSIS — M79.604 PAIN IN BOTH LOWER EXTREMITIES: ICD-10-CM

## 2019-07-19 DIAGNOSIS — M79.605 PAIN IN BOTH LOWER EXTREMITIES: ICD-10-CM

## 2019-07-24 RX ORDER — PREGABALIN 75 MG/1
75 CAPSULE ORAL 2 TIMES DAILY
Qty: 60 CAPSULE | Refills: 2 | Status: SHIPPED | OUTPATIENT
Start: 2019-07-24 | End: 2019-07-25 | Stop reason: SDUPTHER

## 2019-07-25 DIAGNOSIS — M79.604 PAIN IN BOTH LOWER EXTREMITIES: ICD-10-CM

## 2019-07-25 DIAGNOSIS — M79.605 PAIN IN BOTH LOWER EXTREMITIES: ICD-10-CM

## 2019-07-25 RX ORDER — PREGABALIN 75 MG/1
75 CAPSULE ORAL 2 TIMES DAILY
Qty: 180 CAPSULE | Refills: 0 | Status: SHIPPED | OUTPATIENT
Start: 2019-07-25 | End: 2019-12-05 | Stop reason: SDUPTHER

## 2019-08-02 ENCOUNTER — CLINICAL SUPPORT (OUTPATIENT)
Dept: REHABILITATION | Facility: HOSPITAL | Age: 59
End: 2019-08-02
Attending: NEUROLOGICAL SURGERY
Payer: MEDICARE

## 2019-08-02 DIAGNOSIS — M79.672 BILATERAL FOOT PAIN: ICD-10-CM

## 2019-08-02 DIAGNOSIS — M25.561 BILATERAL CHRONIC KNEE PAIN: ICD-10-CM

## 2019-08-02 DIAGNOSIS — M79.671 BILATERAL FOOT PAIN: ICD-10-CM

## 2019-08-02 DIAGNOSIS — M25.562 BILATERAL CHRONIC KNEE PAIN: ICD-10-CM

## 2019-08-02 DIAGNOSIS — R29.898 WEAKNESS OF BOTH LOWER EXTREMITIES: ICD-10-CM

## 2019-08-02 DIAGNOSIS — G89.29 BILATERAL CHRONIC KNEE PAIN: ICD-10-CM

## 2019-08-02 DIAGNOSIS — R26.81 UNSTEADY GAIT: ICD-10-CM

## 2019-08-02 PROCEDURE — 97116 GAIT TRAINING THERAPY: CPT | Mod: PN | Performed by: PHYSICAL THERAPIST

## 2019-08-02 PROCEDURE — 97110 THERAPEUTIC EXERCISES: CPT | Mod: PN | Performed by: PHYSICAL THERAPIST

## 2019-08-02 NOTE — PROGRESS NOTES
"Physical Therapy Daily Treatment Note/ Progress Note      Name: Kylie Aleman  Clinic Number: [id number]     Therapy Diagnosis:        Encounter Diagnoses   Name Primary?    Bilateral chronic knee pain      Bilateral foot pain Yes    Unsteady gait      Weakness of lower extremity, unspecified laterality        Physician: Mannie Hutchison MD     Visit Date: 8/2/2019      Physician Orders: PT Eval and Treat  Medical Diagnosis: pain in both lower extremities, gait instability  Evaluation Date: 5/9/19  Last PN: 7/17/2019  Authorization Period Expiration: 06/30/2020  New Plan of Care Certification Period: 7/17/2019 - 10/9/2019  Visit #/Visits authorized: 2/12 (New Referral)   Total Visits: 9     Time In: 0904  Time Out: 1008  Total Billable Time: 53 minutes     Precautions: Standard     Subjective      Pt reports: no new complaints, walking every other day (limited by pain)     She was compliant with home exercise program.  Response to previous treatment: Muscle fatigue   Functional change: Has been able to complete laundry on her own at home     Pain: 6/10, denied pain at conclusion of session  Location: bilateral feet, ECTOR knees     Objective      BOLD = new exercise or exercise progression     Kylie received therapeutic exercises to develop strength and endurance for 43 minutes including:     Nu Step x 8 mins, lv 1.0      Sitting:   B HS stretch with strap 3x30"  B Marching 3x20 3# weights    Supine:   Quad sets BLE 20x5" holds with heel propped  SAQ 2 x 20  2# weights  Seated LAQ  With 3# weights 2 x 20   Bridge 3 x 10  HL hip add with ball 30 x 3" holds   Hl hip abd Green thera band  30 x 3" holds  SLR x30 ea with 2# weight donned   Clamshell Red thera band x 30  x10 sit to stand from EOM to w/c with minimal verbal cues on using one hand on surface she is sitting on             Kylie participated in gait training for 10 minutes including:   Ambulating with rolling walker   Sit<>stand with supervision. " Ambulated a distance of 30 ft and 46 ft. Close supervision for ambulation, pt demonstrates short reciprocal steps and relies heavily on UEs for support  Gait belt donned for safety.       Kylie received cold pack for 10 minutes to bilateral knees.        Home Exercises Provided and Patient Education Provided      Education provided:   - Continue with HEP as tolerated     Written Home Exercises Provided: Patient instructed to cont prior HEP.  Exercises were reviewed and Kylie was able to demonstrate them prior to the end of the session.  Kylie demonstrated good  understanding of the education provided.      See EMR under Patient Instructions for exercises provided prior visit.     Assessment   Kylie tolerated treatment well. Pt able to tolerate increased resistance for supine hip abduction, clamshells, and long arc quads. Pt also tolerated increased sets for marching in sitting with weights. Pt also ambulated further than in past PT sessions. Pt continues to be motivated to walk more and is a good candidate to continue with PT services.        Kylie is progressing well towards her goals.   Pt prognosis is Fair.      Pt will continue to benefit from skilled outpatient physical therapy to address the deficits listed in the problem list box on initial evaluation, provide pt/family education and to maximize pt's level of independence in the home and community environment.      Pt's spiritual, cultural and educational needs considered and pt agreeable to plan of care and goals.     Anticipated barriers to physical therapy: LE weakness     Goals:      Short Term Goals:  2-3 weeks; updated 06/12/19  1.Report decreased in pain at worst less than <   / =  4  /10  to increase tolerance for functional activities. Ongoing reports 7/10 at worst  2. Pt to improve B knee range of motion by 8 degrees to allow for improved functional mobility to allow for improvement in IADLs. MET  3. Increased B knee  MMT 1/2  to increase  tolerance for ADL and work activities. MET  4. Pt to report an ability to perform stand pivot transfer into her sofa. MET   5. Pt to tolerate HEP to improve ROM and independence with ADL's. MET     Long Term Goals:  6-8 weeks  1. Report decreased in pain at worse less than  <   / =  2  /10  to increase tolerance for functional mobility. ongoing  2. Pt to improve B knee range of motion by 8 degrees to allow for improved functional mobility to allow for improvement in IADLs.  MET  3. Increased B knee MMT 1 grade  to increase tolerance for ADL and work activities. progressing  4. Pt will scores report at CJ level (20-40% impaired) on LEFS  to demonstrate increase in LE function with every day tasks. progressing  5. Pt to be Independent with HEP to improve ROM and independence with ADL's. MET           Plan      Continue with strengthening and gait training as tolerated         Yelena Ferrer,XANDER  8/2/2019

## 2019-08-07 ENCOUNTER — CLINICAL SUPPORT (OUTPATIENT)
Dept: REHABILITATION | Facility: HOSPITAL | Age: 59
End: 2019-08-07
Attending: NEUROLOGICAL SURGERY
Payer: MEDICARE

## 2019-08-07 DIAGNOSIS — M25.562 BILATERAL CHRONIC KNEE PAIN: ICD-10-CM

## 2019-08-07 DIAGNOSIS — M25.561 BILATERAL CHRONIC KNEE PAIN: ICD-10-CM

## 2019-08-07 DIAGNOSIS — M79.672 BILATERAL FOOT PAIN: ICD-10-CM

## 2019-08-07 DIAGNOSIS — R26.81 UNSTEADY GAIT: ICD-10-CM

## 2019-08-07 DIAGNOSIS — G89.29 BILATERAL CHRONIC KNEE PAIN: ICD-10-CM

## 2019-08-07 DIAGNOSIS — R29.898 WEAKNESS OF BOTH LOWER EXTREMITIES: ICD-10-CM

## 2019-08-07 DIAGNOSIS — M79.671 BILATERAL FOOT PAIN: ICD-10-CM

## 2019-08-07 PROCEDURE — 97110 THERAPEUTIC EXERCISES: CPT | Mod: PN

## 2019-08-07 PROCEDURE — 97116 GAIT TRAINING THERAPY: CPT | Mod: PN

## 2019-08-07 NOTE — PROGRESS NOTES
"Physical Therapy Daily Treatment Note      Name: Kylie Aleman  Clinic Number: [id number]     Therapy Diagnosis:        Encounter Diagnoses   Name Primary?    Bilateral chronic knee pain      Bilateral foot pain Yes    Unsteady gait      Weakness of lower extremity, unspecified laterality        Physician: Mannie Hutchison MD     Visit Date: 8/7/2019      Physician Orders: PT Eval and Treat  Medical Diagnosis: pain in both lower extremities, gait instability  Evaluation Date: 5/9/19  Last PN: 7/17/2019  Authorization Period Expiration: 06/30/2020  New Plan of Care Certification Period: 7/17/2019 - 10/9/2019  Visit #/Visits authorized: 3/12 (New Referral)   Total Visits: 10     Time In: 815  Time Out: 912  Total Billable Time: 47 minutes     Precautions: Standard     Subjective      Pt reports: no new complaints, she continues to walk every other day. She forgot to get peas to ice her knees at home.     She was compliant with home exercise program.  Response to previous treatment: Muscle fatigue   Functional change: Has been able to complete laundry on her own at home     Pain: 6/10, denied pain at conclusion of session  Location: bilateral feet, ECTOR knees     Objective      BOLD = new exercise or exercise progression     Kylie received therapeutic exercises to develop strength and endurance for 30 minutes including:     Nu Step x 10 mins, lv 1.0      Sitting:   B HS stretch with strap 3x30"  B Marching 3x20 3# weights  LAQ  With 3# weights 2 x 20    Supine:   Bridge 3 x 10  Hl hip abd Green thera band  30 x 3" holds        Kylie participated in gait training for 15 minutes including:   Ambulating with rolling walker   Sit<>stand with supervision. Ambulated a distance of 70 ft then an additional 70 ft after a seated rest break. Close supervision for ambulation, pt demonstrates short reciprocal steps and relies heavily on UEs for support  Gait belt donned for safety.       Kylie received cold pack for 10 " minutes to bilateral knees.        Home Exercises Provided and Patient Education Provided      Education provided:   - Continue with HEP as tolerated     Written Home Exercises Provided: Patient instructed to cont prior HEP.  Exercises were reviewed and Kylie was able to demonstrate them prior to the end of the session.  Kylie demonstrated good  understanding of the education provided.      See EMR under Patient Instructions for exercises provided prior visit.     Assessment   Kylie tolerated treatment well. She was again able to tolerated increased distance for ambulation. PT encourages pt to ambulate more outside of PT sessions. She tolerates increased time on [employer] well today. Pt continues to be motivated to walk more and is a good candidate to continue with PT services.        Kylie is progressing well towards her goals.   Pt prognosis is Fair.      Pt will continue to benefit from skilled outpatient physical therapy to address the deficits listed in the problem list box on initial evaluation, provide pt/family education and to maximize pt's level of independence in the home and community environment.      Pt's spiritual, cultural and educational needs considered and pt agreeable to plan of care and goals.     Anticipated barriers to physical therapy: LE weakness     Goals:      Short Term Goals:  2-3 weeks; updated 06/12/19  1.Report decreased in pain at worst less than <   / =  4  /10  to increase tolerance for functional activities. Ongoing reports 7/10 at worst  2. Pt to improve B knee range of motion by 8 degrees to allow for improved functional mobility to allow for improvement in IADLs. MET  3. Increased B knee  MMT 1/2  to increase tolerance for ADL and work activities. MET  4. Pt to report an ability to perform stand pivot transfer into her sofa. MET   5. Pt to tolerate HEP to improve ROM and independence with ADL's. MET     Long Term Goals:  6-8 weeks  1. Report decreased in pain at worse  less than  <   / =  2  /10  to increase tolerance for functional mobility. ongoing  2. Pt to improve B knee range of motion by 8 degrees to allow for improved functional mobility to allow for improvement in IADLs.  MET  3. Increased B knee MMT 1 grade  to increase tolerance for ADL and work activities. progressing  4. Pt will scores report at CJ level (20-40% impaired) on LEFS  to demonstrate increase in LE function with every day tasks. progressing  5. Pt to be Independent with HEP to improve ROM and independence with ADL's. MET           Plan      Continue with strengthening and gait training as tolerated         Jose R Baker,DPT  8/7/2019

## 2019-08-09 ENCOUNTER — CLINICAL SUPPORT (OUTPATIENT)
Dept: REHABILITATION | Facility: HOSPITAL | Age: 59
End: 2019-08-09
Attending: NEUROLOGICAL SURGERY
Payer: MEDICARE

## 2019-08-09 DIAGNOSIS — R29.898 WEAKNESS OF LOWER EXTREMITY, UNSPECIFIED LATERALITY: ICD-10-CM

## 2019-08-09 DIAGNOSIS — M79.671 BILATERAL FOOT PAIN: ICD-10-CM

## 2019-08-09 DIAGNOSIS — G89.29 BILATERAL CHRONIC KNEE PAIN: ICD-10-CM

## 2019-08-09 DIAGNOSIS — M25.562 BILATERAL CHRONIC KNEE PAIN: ICD-10-CM

## 2019-08-09 DIAGNOSIS — R26.81 UNSTEADY GAIT: ICD-10-CM

## 2019-08-09 DIAGNOSIS — M25.561 BILATERAL CHRONIC KNEE PAIN: ICD-10-CM

## 2019-08-09 DIAGNOSIS — M79.672 BILATERAL FOOT PAIN: ICD-10-CM

## 2019-08-09 PROCEDURE — 97116 GAIT TRAINING THERAPY: CPT | Mod: PN | Performed by: PHYSICAL THERAPIST

## 2019-08-09 PROCEDURE — G8979 MOBILITY GOAL STATUS: HCPCS | Mod: CJ,PN | Performed by: PHYSICAL THERAPIST

## 2019-08-09 PROCEDURE — 97110 THERAPEUTIC EXERCISES: CPT | Mod: PN | Performed by: PHYSICAL THERAPIST

## 2019-08-09 PROCEDURE — 97010 HOT OR COLD PACKS THERAPY: CPT | Mod: PN | Performed by: PHYSICAL THERAPIST

## 2019-08-09 PROCEDURE — G8978 MOBILITY CURRENT STATUS: HCPCS | Mod: CL,PN | Performed by: PHYSICAL THERAPIST

## 2019-08-09 NOTE — PROGRESS NOTES
"Physical Therapy Daily Treatment Note      Name: Kylie Aleman  Clinic Number: [id number]     Therapy Diagnosis:        Encounter Diagnoses   Name Primary?    Bilateral chronic knee pain      Bilateral foot pain Yes    Unsteady gait      Weakness of lower extremity, unspecified laterality        Physician: Mannie Hutchison MD     Visit Date: 8/9/2019      Physician Orders: PT Eval and Treat  Medical Diagnosis: pain in both lower extremities, gait instability  Evaluation Date: 5/9/19  Last PN: 7/17/2019  Authorization Period Expiration: 06/30/2020  New Plan of Care Certification Period: 7/17/2019 - 10/9/2019  Visit #/Visits authorized: 3/12 (New Referral)   Total Visits: 11     Time In: 0802  Time Out: 0900  Total Billable Time: 46 minutes     Precautions: Standard     Subjective      Pt reports: I did too much yesterday, I swept, mopped, did laundry, etc. I'm late because my niece thought I had to be here at 8:30     She was compliant with home exercise program.  Response to previous treatment: felt good  Functional change: progressing towards goals.      Pain: 7/10, denied pain at conclusion of session  Location: bilateral feet, ECTOR knees     Objective      BOLD = new exercise or exercise progression     Kylie received therapeutic exercises to develop strength and endurance for 36 minutes including:     Nu Step x 10 mins, lv 1.0      Sitting:   B Marching 3x20 3# weights  LAQ  With 3# weights 2 x 20    Supine:   Supine:   Quad sets BLE 20x5" holds with heel propped  SAQ 3 x 20 3# weights  Seated LAQ  With 3# weights 2 x 20   Bridge 3 x 10  HL hip add with ball 30 x 3" holds   Hl hip abd Green thera band  30 x 3" holds  SLR 3 x 10 ea with 3# weight donned   Clamshell Red thera band x 30  x10 sit to stand from EOM to w/c with no verbal cues on using one hand on surface she is sitting on      Kylie participated in gait training for 10 minutes including:   Ambulating with rolling walker   Sit<>stand with " supervision. Ambulated a distance of 80 ft. Close supervision for ambulation, pt demonstrates short reciprocal steps and relies heavily on UEs for support  Gait belt donned for safety.       Kylie received cold pack for 10 minutes to bilateral knees.        Home Exercises Provided and Patient Education Provided      Education provided:   - Continue with HEP as tolerated     Written Home Exercises Provided: Patient instructed to cont prior HEP.  Exercises were reviewed and Kylie was able to demonstrate them prior to the end of the session.  Kylie demonstrated good  understanding of the education provided.      See EMR under Patient Instructions for exercises provided prior visit.     Assessment   Kylie tolerated treatment well. Pt tolerated increased duration on stepper for cardiovascular tolerance to activity and increased ambulation distance without resting. Pt demonstrated a good tolerance to increased resistance for straight leg raise. Pt did not require cues with sit<>stand today for proper technique. Pt continues to be very motivated to improve fitness and mobility.      Kylie is progressing well towards her goals.   Pt prognosis is Fair.      Pt will continue to benefit from skilled outpatient physical therapy to address the deficits listed in the problem list box on initial evaluation, provide pt/family education and to maximize pt's level of independence in the home and community environment.      Pt's spiritual, cultural and educational needs considered and pt agreeable to plan of care and goals.     Anticipated barriers to physical therapy: LE weakness     Goals:      Short Term Goals:  2-3 weeks; updated 06/12/19  1.Report decreased in pain at worst less than <   / =  4  /10  to increase tolerance for functional activities. Ongoing reports 7/10 at worst  2. Pt to improve B knee range of motion by 8 degrees to allow for improved functional mobility to allow for improvement in IADLs. MET  3.  Increased B knee  MMT 1/2  to increase tolerance for ADL and work activities. MET  4. Pt to report an ability to perform stand pivot transfer into her sofa. MET   5. Pt to tolerate HEP to improve ROM and independence with ADL's. MET     Long Term Goals:  6-8 weeks  1. Report decreased in pain at worse less than  <   / =  2  /10  to increase tolerance for functional mobility. ongoing  2. Pt to improve B knee range of motion by 8 degrees to allow for improved functional mobility to allow for improvement in IADLs.  MET  3. Increased B knee MMT 1 grade  to increase tolerance for ADL and work activities. progressing  4. Pt will scores report at CJ level (20-40% impaired) on LEFS  to demonstrate increase in LE function with every day tasks. progressing  5. Pt to be Independent with HEP to improve ROM and independence with ADL's. MET           Plan      Increase standing as tolerated         Yelena Ferrer DPT  8/9/2019

## 2019-08-12 ENCOUNTER — CLINICAL SUPPORT (OUTPATIENT)
Dept: REHABILITATION | Facility: HOSPITAL | Age: 59
End: 2019-08-12
Attending: NEUROLOGICAL SURGERY
Payer: MEDICARE

## 2019-08-12 DIAGNOSIS — R26.81 UNSTEADY GAIT: ICD-10-CM

## 2019-08-12 DIAGNOSIS — M79.671 BILATERAL FOOT PAIN: ICD-10-CM

## 2019-08-12 DIAGNOSIS — M25.562 BILATERAL CHRONIC KNEE PAIN: ICD-10-CM

## 2019-08-12 DIAGNOSIS — M25.561 BILATERAL CHRONIC KNEE PAIN: ICD-10-CM

## 2019-08-12 DIAGNOSIS — G89.29 BILATERAL CHRONIC KNEE PAIN: ICD-10-CM

## 2019-08-12 DIAGNOSIS — R29.898 WEAKNESS OF LOWER EXTREMITY, UNSPECIFIED LATERALITY: ICD-10-CM

## 2019-08-12 DIAGNOSIS — M79.672 BILATERAL FOOT PAIN: ICD-10-CM

## 2019-08-12 PROCEDURE — 97110 THERAPEUTIC EXERCISES: CPT | Mod: PN

## 2019-08-12 PROCEDURE — 97116 GAIT TRAINING THERAPY: CPT | Mod: PN

## 2019-08-12 NOTE — PROGRESS NOTES
"Physical Therapy Daily Treatment Note      Name: Kylie Aleman  Clinic Number: [id number]     Therapy Diagnosis:        Encounter Diagnoses   Name Primary?    Bilateral chronic knee pain      Bilateral foot pain Yes    Unsteady gait      Weakness of lower extremity, unspecified laterality        Physician: Mannie Hutchison MD     Visit Date: 8/12/2019      Physician Orders: PT Eval and Treat  Medical Diagnosis: pain in both lower extremities, gait instability  Evaluation Date: 5/9/19  Last PN: 7/17/2019  Authorization Period Expiration: 06/30/2020  New Plan of Care Certification Period: 7/17/2019 - 10/9/2019  Visit #/Visits authorized: 4/12 (New Referral)   Total Visits: 12     Time In: 900  Time Out: 954  Total Billable Time: 44 minutes     Precautions: Standard     Subjective      Pt reports: I had a bad weekend. Yesterday I couldn't do anything because my feet were hurting. I didn't do too much, I think its because of the weather     She was compliant with home exercise program.  Response to previous treatment: felt good  Functional change: progressing towards goals.      Pain: 5/10, denied pain at conclusion of session  Location: bilateral feet, ECTOR knees     Objective      BOLD = new exercise or exercise progression     Kylie received therapeutic exercises to develop strength and endurance for 34 minutes including:     Nu Step x 10 mins, lv 1.0     Sitting:   B Marching 3x20 3# weights  LAQ  With 3# weights 2 x 20  Heel raise with 3# weight donned, 2x20    Supine:   SAQ 3 x 20 3# weights  Bridge 3 x 12  HL hip add with ball 30 x 3" holds   SLR 3 x 10 ea with 3# weight donned   Clamshell Red thera band x 30 ea  x10 sit to stand from EOM to RW with no verbal cues on using one hand on surface she is sitting on      Kylie participated in gait training for 10 minutes including:   Ambulating with rolling walker   Sit<>stand with supervision. Ambulated a distance of 88 ft. Close supervision for ambulation, " pt demonstrates short reciprocal steps and relies heavily on UEs for support  Gait belt donned for safety.       Kylie received cold pack for 10 minutes to bilateral knees.        Home Exercises Provided and Patient Education Provided      Education provided:   - Continue with HEP as tolerated     Written Home Exercises Provided: Patient instructed to cont prior HEP.  Exercises were reviewed and Kylie was able to demonstrate them prior to the end of the session.  Kylie demonstrated good  understanding of the education provided.      See EMR under Patient Instructions for exercises provided prior visit.     Assessment   Kylie tolerated treatment well. Pt continues to increase ambulation distance nearly each session without taking rest break, though she only ambulated one trial today. Pt may benefit from implementation of further exercises in standing to promote increased weight bearing for extended periods. Pt continues to be very motivated to improve fitness and mobility.      Kylie is progressing well towards her goals.   Pt prognosis is Fair.      Pt will continue to benefit from skilled outpatient physical therapy to address the deficits listed in the problem list box on initial evaluation, provide pt/family education and to maximize pt's level of independence in the home and community environment.      Pt's spiritual, cultural and educational needs considered and pt agreeable to plan of care and goals.     Anticipated barriers to physical therapy: LE weakness     Goals:      Short Term Goals:  2-3 weeks; updated 06/12/19  1.Report decreased in pain at worst less than <   / =  4  /10  to increase tolerance for functional activities. Ongoing reports 7/10 at worst  2. Pt to improve B knee range of motion by 8 degrees to allow for improved functional mobility to allow for improvement in IADLs. MET  3. Increased B knee  MMT 1/2  to increase tolerance for ADL and work activities. MET  4. Pt to report an  ability to perform stand pivot transfer into her sofa. MET   5. Pt to tolerate HEP to improve ROM and independence with ADL's. MET     Long Term Goals:  6-8 weeks  1. Report decreased in pain at worse less than  <   / =  2  /10  to increase tolerance for functional mobility. ongoing  2. Pt to improve B knee range of motion by 8 degrees to allow for improved functional mobility to allow for improvement in IADLs.  MET  3. Increased B knee MMT 1 grade  to increase tolerance for ADL and work activities. progressing  4. Pt will scores report at CJ level (20-40% impaired) on LEFS  to demonstrate increase in LE function with every day tasks. progressing  5. Pt to be Independent with HEP to improve ROM and independence with ADL's. MET           Plan      Increase standing as tolerated         Jose R Baker,SHAHIDT  8/12/2019

## 2019-08-14 ENCOUNTER — CLINICAL SUPPORT (OUTPATIENT)
Dept: REHABILITATION | Facility: HOSPITAL | Age: 59
End: 2019-08-14
Attending: NEUROLOGICAL SURGERY
Payer: MEDICARE

## 2019-08-14 DIAGNOSIS — M25.561 BILATERAL CHRONIC KNEE PAIN: ICD-10-CM

## 2019-08-14 DIAGNOSIS — R29.898 WEAKNESS OF LOWER EXTREMITY, UNSPECIFIED LATERALITY: ICD-10-CM

## 2019-08-14 DIAGNOSIS — M79.671 BILATERAL FOOT PAIN: ICD-10-CM

## 2019-08-14 DIAGNOSIS — M25.562 BILATERAL CHRONIC KNEE PAIN: ICD-10-CM

## 2019-08-14 DIAGNOSIS — M79.672 BILATERAL FOOT PAIN: ICD-10-CM

## 2019-08-14 DIAGNOSIS — R26.81 UNSTEADY GAIT: ICD-10-CM

## 2019-08-14 DIAGNOSIS — G89.29 BILATERAL CHRONIC KNEE PAIN: ICD-10-CM

## 2019-08-14 PROCEDURE — 97116 GAIT TRAINING THERAPY: CPT | Mod: PN

## 2019-08-14 PROCEDURE — 97110 THERAPEUTIC EXERCISES: CPT | Mod: PN

## 2019-08-14 NOTE — PLAN OF CARE
Physical Therapy Daily Treatment Note      Name: Kylie Aleman  Clinic Number: [id number]     Therapy Diagnosis:        Encounter Diagnoses   Name Primary?    Bilateral chronic knee pain      Bilateral foot pain Yes    Unsteady gait      Weakness of lower extremity, unspecified laterality        Physician: Mannie Hutchison MD     Visit Date: 8/14/2019      Physician Orders: PT Eval and Treat  Medical Diagnosis: pain in both lower extremities, gait instability  Evaluation Date: 5/9/19  Last PN: 7/17/2019  Authorization Period Expiration: 06/30/2020  New Plan of Care Certification Period: 7/17/2019 - 10/9/2019  Visit #/Visits authorized: 5/12 (New Referral)   Total Visits: 13     Time In: 815  Time Out: 915  Total Billable Time: 50 minutes     Precautions: Standard     Subjective      Pt reports: I felt so good after last session. I was able to walk back and forth from the kitchen, den, and bedroom all afternoon! And I walked again yesterday!      She was compliant with home exercise program.  Response to previous treatment: felt great, able to walk more  Functional change: able to walk more      Pain: 4/10, denied pain at conclusion of session  Location: bilateral feet, ECTOR knees     Objective      BOLD = new exercise or exercise progression     Kylie received therapeutic exercises to develop strength and endurance for 40 minutes including:     Nu Step x 10 mins, lv 1.5     Knee Range of Motion:    Left Passive 8/13/19 Right Passive 8/13/19   Flexion WNL WNL WNL WNL   Extension -8 -5 -13 -8         Lower Extremity Strength                  Right LE   8/14/19 Left LE   8/14/19   Knee extension: 4+/5 5/5 Knee extension: 4+/5 5/5   Knee flexion: 4+/5 4+/5 Knee flexion: 4+/5 4+/5   Hip flexion: 4+/5 5/5 Hip flexion: 4+/5 5/5   Hip Internal Rotation:  4/5    NT Hip Internal Rotation: 4/5    NT   Hip External Rotation: 4/5    NT Hip External Rotation: 4/5    NT   Hip extension:  (tested in sidelying) 4/5 4+/5 Hip  "extension:  (tested in sidelying) 4-/5 4/5   Hip abduction: 4/5 4-/5 Hip abduction: 4-/5 4-/5       Supine:   SAQ 3 x 20 3# weights  Bridge 3 x 12  HL hip abd with green theraband 3x15 with 3" holds   SLR 3 x 10 ea with 3# weight donned       Kylie participated in gait training for 10 minutes including:   Ambulating with rolling walker     TU seconds (previous: 1:57 minutes)  Sit<>stand with supervision. Ambulated a distance of 80 ft. Close supervision for ambulation, pt demonstrates short reciprocal steps and relies heavily on UEs for support      Kylie received cold pack for 10 minutes to bilateral feet.          CMS Impairment/Limitation/Restriction for FOTO Status Survey    Therapist reviewed FOTO scores for Kylie Aleman on 2019.   FOTO documents entered into [employer] - see Media section.    Limitation Score: 66%  Category: Mobility    Current : CL = least 60% but < 80% impaired, limited or restricted  Goal: CK = at least 40% but < 60% impaired, limited or restricted         Home Exercises Provided and Patient Education Provided      Education provided:   - Continue with HEP as tolerated     Written Home Exercises Provided: Patient instructed to cont prior HEP.  Exercises were reviewed and Kylie was able to demonstrate them prior to the end of the session.  Kylie demonstrated good  understanding of the education provided.      See EMR under Patient Instructions for exercises provided prior visit.     Assessment   Assessment period 2019-2019: Kylie tolerated reassessment well today. She is demonstrating steady improvement in function as demonstrated by significantly decreased time for completion of TUG and overall improving muscular strength. Pt is highly motivated to move better and walk more and is compliant with her HEP. Despite these demonstrations of functional change, pt continues to report similar functioning at home on FOTO survey. She will continue to benefit from skilled PT " Services in order to increase her endurance, strength in B LE, and continue to increase her ambulation tolerance. She continually reports reduced pain in B LE and tolerance to activity at home. Her goals have been addressed and she remains appropriate for skilled PT services.      Kylie is progressing well towards her goals.   Pt prognosis is Fair.      Pt will continue to benefit from skilled outpatient physical therapy to address the deficits listed in the problem list box on initial evaluation, provide pt/family education and to maximize pt's level of independence in the home and community environment.      Pt's spiritual, cultural and educational needs considered and pt agreeable to plan of care and goals.     Anticipated barriers to physical therapy: endurance and chronicity of condition     Goals:      Short Term Goals:  2-3 weeks; updated 08/14/19  1.Report decreased in pain at worst less than <   / =  4  /10  to increase tolerance for functional activities. Ongoing reports 7/10 at worst  2. Pt to improve B knee range of motion by 8 degrees to allow for improved functional mobility to allow for improvement in IADLs. MET  3. Increased B knee  MMT 1/2  to increase tolerance for ADL and work activities. MET  4. Pt to report an ability to perform stand pivot transfer into her sofa. MET   5. Pt to tolerate HEP to improve ROM and independence with ADL's. MET     Long Term Goals:  6-8 weeks  1. Report decreased in pain at worse less than  <   / =  2  /10  to increase tolerance for functional mobility. ongoing  2. Pt to improve B knee range of motion by 8 degrees to allow for improved functional mobility to allow for improvement in IADLs.  MET  3. Increased B knee MMT 1 grade  to increase tolerance for ADL and work activities. ongoing  4. Pt will scores report at CJ level (20-40% impaired) on LEFS  to demonstrate increase in LE function with every day tasks. progressing  5. Pt to be Independent with HEP to  improve ROM and independence with ADL's. MET  6. New goal 8/14/2019: Pt will decrease score on TUG to </= 30 seconds in order to make her ambulation more functional. progressing           Plan      Continue to work on endurance training, strengthening, and increased B LE ROM.        Jose R Baker, PT, DPT  8/14/2019

## 2019-08-19 ENCOUNTER — CLINICAL SUPPORT (OUTPATIENT)
Dept: REHABILITATION | Facility: HOSPITAL | Age: 59
End: 2019-08-19
Attending: NEUROLOGICAL SURGERY
Payer: MEDICARE

## 2019-08-19 DIAGNOSIS — M79.671 BILATERAL FOOT PAIN: ICD-10-CM

## 2019-08-19 DIAGNOSIS — M79.672 BILATERAL FOOT PAIN: ICD-10-CM

## 2019-08-19 DIAGNOSIS — G89.29 BILATERAL CHRONIC KNEE PAIN: ICD-10-CM

## 2019-08-19 DIAGNOSIS — M25.562 BILATERAL CHRONIC KNEE PAIN: ICD-10-CM

## 2019-08-19 DIAGNOSIS — R26.81 UNSTEADY GAIT: ICD-10-CM

## 2019-08-19 DIAGNOSIS — R29.898 WEAKNESS OF LOWER EXTREMITY, UNSPECIFIED LATERALITY: ICD-10-CM

## 2019-08-19 DIAGNOSIS — M25.561 BILATERAL CHRONIC KNEE PAIN: ICD-10-CM

## 2019-08-19 PROCEDURE — 97110 THERAPEUTIC EXERCISES: CPT | Mod: PN

## 2019-08-19 PROCEDURE — 97116 GAIT TRAINING THERAPY: CPT | Mod: PN

## 2019-08-19 NOTE — PROGRESS NOTES
Physical Therapy Daily Treatment Note      Name: Kylie Aleman  Clinic Number: [id number]     Therapy Diagnosis:        Encounter Diagnoses   Name Primary?    Bilateral chronic knee pain      Bilateral foot pain Yes    Unsteady gait      Weakness of lower extremity, unspecified laterality        Physician: Mannie Hutchison MD     Visit Date: 8/19/2019      Physician Orders: PT Eval and Treat  Medical Diagnosis: pain in both lower extremities, gait instability  Evaluation Date: 5/9/19  Last PN: 7/17/2019  Authorization Period Expiration: 06/30/2020  New Plan of Care Certification Period: 7/17/2019 - 10/9/2019  Visit #/Visits authorized: 6/12 (New Referral)   Total Visits: 15     Time In: 815  Time Out: 911  Total Billable Time: 46 minutes     Precautions: Standard     Subjective      Pt reports: She had another bad weekend. She did not do much because her knees and feet were hurting her.      She was compliant with home exercise program.  Response to previous treatment: felt good  Functional change: progressing towards goals.      Pain: 6/10, denied pain at conclusion of session  Location: bilateral feet, ECTOR knees     Objective      BOLD = new exercise or exercise progression     Kylie received therapeutic exercises to develop strength and endurance for 31 minutes including:     Nu Step x 10 mins, lv 1.5     Sitting:   B Marching 3x20 3# weights  LAQ  With 3# weights 2 x 20  Heel raise with 3# weight donned, 2x20    Supine:   SAQ 3 x 20 3# weights  Bridge 3 x 12   SLR 3 x 15 ea with 3# weight donned       Kylie participated in gait training for 15 minutes including:   Ambulating with rolling walker:   Sit<>stand with supervision. Ambulated a distance of 76 ft then 88 ft. She takes a seated rest break between trials. Close supervision for ambulation, pt demonstrates short reciprocal steps and relies heavily on UEs for support  Pt ambulates an addition 18 feet to therapy mat during session.      Kylie  received cold pack for 10 minutes to bilateral feet.        Home Exercises Provided and Patient Education Provided      Education provided:   - Continue with HEP as tolerated     Written Home Exercises Provided: Patient instructed to cont prior HEP.  Exercises were reviewed and Kylie was able to demonstrate them prior to the end of the session.  Kylie demonstrated good  understanding of the education provided.      See EMR under Patient Instructions for exercises provided prior visit.     Assessment   Kylie tolerated treatment well. She continues to do well with therex for strengthening and endurance. Pt tolerates three trials of ambulation today, with seated rest between all trials. She continues to report decreased pain with use of ice during sessions and is encouraged to ice her feet/knees outside of therapy sessions in order to consistently have reduced pain.      Kylie is progressing well towards her goals.   Pt prognosis is Fair.      Pt will continue to benefit from skilled outpatient physical therapy to address the deficits listed in the problem list box on initial evaluation, provide pt/family education and to maximize pt's level of independence in the home and community environment.      Pt's spiritual, cultural and educational needs considered and pt agreeable to plan of care and goals.     Anticipated barriers to physical therapy: LE weakness     Goals:      Short Term Goals:  2-3 weeks; updated 08/14/19  1.Report decreased in pain at worst less than <   / =  4  /10  to increase tolerance for functional activities. Ongoing reports 7/10 at worst  2. Pt to improve B knee range of motion by 8 degrees to allow for improved functional mobility to allow for improvement in IADLs. MET  3. Increased B knee  MMT 1/2  to increase tolerance for ADL and work activities. MET  4. Pt to report an ability to perform stand pivot transfer into her sofa. MET   5. Pt to tolerate HEP to improve ROM and independence  with ADL's. MET     Long Term Goals:  6-8 weeks  1. Report decreased in pain at worse less than  <   / =  2  /10  to increase tolerance for functional mobility. ongoing  2. Pt to improve B knee range of motion by 8 degrees to allow for improved functional mobility to allow for improvement in IADLs.  MET  3. Increased B knee MMT 1 grade  to increase tolerance for ADL and work activities. ongoing  4. Pt will scores report at CJ level (20-40% impaired) on LEFS  to demonstrate increase in LE function with every day tasks. progressing  5. Pt to be Independent with HEP to improve ROM and independence with ADL's. MET  6. New goal 8/14/2019: Pt will decrease score on TUG to </= 30 seconds in order to make her ambulation more functional. progressing         Plan      Continue to work on endurance training, strengthening, and increased B LE ROM.        Jose R Baker,DPT  8/19/2019

## 2019-08-21 ENCOUNTER — CLINICAL SUPPORT (OUTPATIENT)
Dept: REHABILITATION | Facility: HOSPITAL | Age: 59
End: 2019-08-21
Attending: NEUROLOGICAL SURGERY
Payer: MEDICARE

## 2019-08-21 DIAGNOSIS — M25.561 BILATERAL CHRONIC KNEE PAIN: ICD-10-CM

## 2019-08-21 DIAGNOSIS — M25.562 BILATERAL CHRONIC KNEE PAIN: ICD-10-CM

## 2019-08-21 DIAGNOSIS — R29.898 WEAKNESS OF LOWER EXTREMITY, UNSPECIFIED LATERALITY: ICD-10-CM

## 2019-08-21 DIAGNOSIS — G89.29 BILATERAL CHRONIC KNEE PAIN: ICD-10-CM

## 2019-08-21 DIAGNOSIS — M79.672 BILATERAL FOOT PAIN: ICD-10-CM

## 2019-08-21 DIAGNOSIS — M79.671 BILATERAL FOOT PAIN: ICD-10-CM

## 2019-08-21 DIAGNOSIS — R26.81 UNSTEADY GAIT: Primary | ICD-10-CM

## 2019-08-21 PROCEDURE — 97110 THERAPEUTIC EXERCISES: CPT | Mod: PN

## 2019-08-21 PROCEDURE — 97116 GAIT TRAINING THERAPY: CPT | Mod: PN

## 2019-08-21 NOTE — PROGRESS NOTES
Physical Therapy Daily Treatment Note      Name: Kylie Aleman  Clinic Number: [id number]     Therapy Diagnosis:        Encounter Diagnoses   Name Primary?    Bilateral chronic knee pain      Bilateral foot pain Yes    Unsteady gait      Weakness of lower extremity, unspecified laterality        Physician: Mannie Hutchison MD     Visit Date: 8/21/2019      Physician Orders: PT Eval and Treat  Medical Diagnosis: pain in both lower extremities, gait instability  Evaluation Date: 5/9/19  Last PN: 8/14/2019  Authorization Period Expiration: 06/30/2020  New Plan of Care Certification Period: 7/17/2019 - 10/9/2019  Visit #/Visits authorized: 7/12 (New Referral)   Total Visits: 16     Time In: 815  Time Out: 915  Total Billable Time: 45 minutes     Precautions: Standard     Subjective      Pt reports: She is doing well today. Her knees are acting up this morning but not too bad.      She was compliant with home exercise program.  Response to previous treatment: felt good  Functional change: progressing towards goals.      Pain: 5/10, denied pain at conclusion of session  Location: bilateral feet, ECTOR knees     Objective      BOLD = new exercise or exercise progression     Kylie received therapeutic exercises to develop strength and endurance for 30 minutes including:     Nu Step x 10 mins, lv 1.5     Sitting:   B Marching 3x20 3# weights  LAQ  With 3# weights 2 x 20  Heel raise with 3# weight donned, 3x20    Supine:   Bridge 3 x 15   SLR 3 x 15 ea with 3# weight donned       Kylie participated in gait training for 15 minutes including:   Ambulating with rolling walker:   Sit<>stand with supervision. Ambulated a distance of 100 ft then 100 ft. She takes a seated rest break between trials. Close supervision for ambulation, pt demonstrates short reciprocal steps and relies heavily on UEs for support. She reports at termination of trials that both UE and LEs are fatigued is the reason she needs rest.       Kylie  received cold pack for 10 minutes to bilateral feet. No skin breakdown noted prior to or following application.        Home Exercises Provided and Patient Education Provided      Education provided:   - Continue with HEP as tolerated     Written Home Exercises Provided: Patient instructed to cont prior HEP.  Exercises were reviewed and Kylie was able to demonstrate them prior to the end of the session.  Kylie demonstrated good  understanding of the education provided.      See EMR under Patient Instructions for exercises provided prior visit.     Assessment   Kylei tolerated treatment well. She was able to increase ambulation distance today, and PT encourages pt to achieve this distance twice. Pt continues to do well with seated therex and may benefit from standing therex soon. She reports decreased pain with the use of ice on B feet. She remains appropriate for skilled PT services.       Kylie is progressing well towards her goals.   Pt prognosis is Fair.      Pt will continue to benefit from skilled outpatient physical therapy to address the deficits listed in the problem list box on initial evaluation, provide pt/family education and to maximize pt's level of independence in the home and community environment.      Pt's spiritual, cultural and educational needs considered and pt agreeable to plan of care and goals.     Anticipated barriers to physical therapy: LE weakness     Goals:      Short Term Goals:  2-3 weeks; updated 08/14/19  1.Report decreased in pain at worst less than <   / =  4  /10  to increase tolerance for functional activities. Ongoing reports 7/10 at worst  2. Pt to improve B knee range of motion by 8 degrees to allow for improved functional mobility to allow for improvement in IADLs. MET  3. Increased B knee  MMT 1/2  to increase tolerance for ADL and work activities. MET  4. Pt to report an ability to perform stand pivot transfer into her sofa. MET   5. Pt to tolerate HEP to improve  ROM and independence with ADL's. MET     Long Term Goals:  6-8 weeks  1. Report decreased in pain at worse less than  <   / =  2  /10  to increase tolerance for functional mobility. ongoing  2. Pt to improve B knee range of motion by 8 degrees to allow for improved functional mobility to allow for improvement in IADLs.  MET  3. Increased B knee MMT 1 grade  to increase tolerance for ADL and work activities. ongoing  4. Pt will scores report at CJ level (20-40% impaired) on LEFS  to demonstrate increase in LE function with every day tasks. progressing  5. Pt to be Independent with HEP to improve ROM and independence with ADL's. MET  6. New goal 8/14/2019: Pt will decrease score on TUG to </= 30 seconds in order to make her ambulation more functional. progressing         Plan      Continue to work on endurance training, strengthening, and increased B LE ROM. Achieve this by incorporating standing therex soon.        Jose R Baker,DPT  8/21/2019

## 2019-08-27 NOTE — PROGRESS NOTES
Physical Therapy Daily Treatment Note      Name: Kylie Aleman  Clinic Number: [id number]     Therapy Diagnosis:        Encounter Diagnoses   Name Primary?    Bilateral chronic knee pain      Bilateral foot pain Yes    Unsteady gait      Weakness of lower extremity, unspecified laterality        Physician: Mannie Hutchison MD     Visit Date: 8/28/2019      Physician Orders: PT Eval and Treat  Medical Diagnosis: pain in both lower extremities, gait instability  Evaluation Date: 5/9/19  Last PN: 8/14/2019  Authorization Period Expiration: 06/30/2020  New Plan of Care Certification Period: 7/17/2019 - 10/9/2019  Visit #/Visits authorized: 8/12 (New Referral)   Total Visits: 17     Time In: 953 (pt arrives late to session, unable to accomodate)  Time Out: 1045  Total Billable Time: 40 minutes     Precautions: Standard     Subjective      Pt reports: She is doing well today. She may have over done it some yesterday because she walked a lot and cleaned her house.     She was compliant with home exercise program.  Response to previous treatment: felt good  Functional change: progressing towards goals.      Pain: 4/10, denied pain at conclusion of session  Location: L knee     Objective      BOLD = new exercise or exercise progression     Pt arrives in manual wc.     Kylie received therapeutic exercises to develop strength and endurance for 20 minutes including:     Sitting:   B Marching 3x20 3# weights  LAQ  With 3# weights 3 x 20  Heel raise with 3# weight donned, 3x20    Supine:   Bridge 3 x 15   SLR 3 x 15 ea with 3# weight donned   3 x 30 hamstring stretch      Kylie participated in gait training for 20 minutes including:   Ambulating with rolling walker:   Sit<>stand with supervision. Ambulated a distance of 100 ft then 110 ft. She takes a seated rest break between trials. Close supervision for ambulation, pt demonstrates short reciprocal steps and relies heavily on UEs for support. She reports at  termination of trials that both UE and LEs are fatigued is the reason she needs rest.       Pt exits session using RW for 50' of additional ambulation using the same pattern of gait as described above.    Kylie received cold pack for 10 minutes to bilateral feet. No skin breakdown noted prior to or following application.        Home Exercises Provided and Patient Education Provided      Education provided:   - Continue with HEP as tolerated     Written Home Exercises Provided: Patient instructed to cont prior HEP.  Exercises were reviewed and Kylie was able to demonstrate them prior to the end of the session.  Kylie demonstrated good  understanding of the education provided.      See EMR under Patient Instructions for exercises provided prior visit.     Assessment   Kylie tolerated treatment well. She was able to increase ambulation distance today, as well as ambulate out of session today. Pt continues to do well with seated/supine therex. She reports decreased pain with the use of ice on B feet and increased ability to perform household activities outside of therapy. She remains appropriate for skilled PT services.       Kylie is progressing well towards her goals.   Pt prognosis is Fair.      Pt will continue to benefit from skilled outpatient physical therapy to address the deficits listed in the problem list box on initial evaluation, provide pt/family education and to maximize pt's level of independence in the home and community environment.      Pt's spiritual, cultural and educational needs considered and pt agreeable to plan of care and goals.     Anticipated barriers to physical therapy: LE weakness     Goals:      Short Term Goals:  2-3 weeks; updated 08/14/19  1.Report decreased in pain at worst less than <   / =  4  /10  to increase tolerance for functional activities. Ongoing reports 7/10 at worst  2. Pt to improve B knee range of motion by 8 degrees to allow for improved functional mobility  to allow for improvement in IADLs. MET  3. Increased B knee  MMT 1/2  to increase tolerance for ADL and work activities. MET  4. Pt to report an ability to perform stand pivot transfer into her sofa. MET   5. Pt to tolerate HEP to improve ROM and independence with ADL's. MET     Long Term Goals:  6-8 weeks  1. Report decreased in pain at worse less than  <   / =  2  /10  to increase tolerance for functional mobility. ongoing  2. Pt to improve B knee range of motion by 8 degrees to allow for improved functional mobility to allow for improvement in IADLs.  MET  3. Increased B knee MMT 1 grade  to increase tolerance for ADL and work activities. ongoing  4. Pt will scores report at CJ level (20-40% impaired) on LEFS  to demonstrate increase in LE function with every day tasks. progressing  5. Pt to be Independent with HEP to improve ROM and independence with ADL's. MET  6. New goal 8/14/2019: Pt will decrease score on TUG to </= 30 seconds in order to make her ambulation more functional. progressing         Plan      Continue to work on endurance training, strengthening, and increased B LE ROM. Achieve this by incorporating standing therex soon.        Jose R Baker,DPT  8/28/2019

## 2019-08-28 ENCOUNTER — CLINICAL SUPPORT (OUTPATIENT)
Dept: REHABILITATION | Facility: HOSPITAL | Age: 59
End: 2019-08-28
Attending: NEUROLOGICAL SURGERY
Payer: MEDICARE

## 2019-08-28 DIAGNOSIS — M25.562 BILATERAL CHRONIC KNEE PAIN: ICD-10-CM

## 2019-08-28 DIAGNOSIS — M79.671 BILATERAL FOOT PAIN: ICD-10-CM

## 2019-08-28 DIAGNOSIS — R26.81 UNSTEADY GAIT: ICD-10-CM

## 2019-08-28 DIAGNOSIS — G89.29 BILATERAL CHRONIC KNEE PAIN: ICD-10-CM

## 2019-08-28 DIAGNOSIS — M79.672 BILATERAL FOOT PAIN: ICD-10-CM

## 2019-08-28 DIAGNOSIS — R29.898 WEAKNESS OF LOWER EXTREMITY, UNSPECIFIED LATERALITY: ICD-10-CM

## 2019-08-28 DIAGNOSIS — M25.561 BILATERAL CHRONIC KNEE PAIN: ICD-10-CM

## 2019-08-28 PROCEDURE — 97110 THERAPEUTIC EXERCISES: CPT | Mod: PN

## 2019-08-28 PROCEDURE — 97116 GAIT TRAINING THERAPY: CPT | Mod: PN

## 2019-09-03 NOTE — PROGRESS NOTES
Physical Therapy Daily Treatment Note      Name: Kylie Aleman  Clinic Number: [id number]     Therapy Diagnosis:        Encounter Diagnoses   Name Primary?    Bilateral chronic knee pain      Bilateral foot pain Yes    Unsteady gait      Weakness of lower extremity, unspecified laterality        Physician: Mannie Hutchison MD     Visit Date: 9/4/2019      Physician Orders: PT Eval and Treat  Medical Diagnosis: pain in both lower extremities, gait instability  Evaluation Date: 5/9/19  Last PN: 8/14/2019  Authorization Period Expiration: 06/30/2020  New Plan of Care Certification Period: 7/17/2019 - 10/9/2019  Visit #/Visits authorized: 9/12 (New Referral)   Total Visits: 18      Time In: 907 (pt arrives late to session, unable to accommodate)   Time Out: 1000  Total Billable Time: 43 minutes     Precautions: Standard     Subjective      Pt reports: She is doing well today. She kept busy this weekend with various things and felt overall pretty good, her pain only got to 6/10.      She was compliant with home exercise program.  Response to previous treatment: felt good  Functional change: progressing towards goals.      Pain: 4/10, denied pain at conclusion of session  Location: L knee     Objective      BOLD = new exercise or exercise progression     Pt arrives in manual wc.     Kylie received therapeutic exercises to develop strength and endurance for 20 minutes including:     SciFit StepOne, level 2.5 for 10 minutes to increase cardiovascular endurance    x10 sit<>stand from therapy mat (blue mat) with cues for proper hand placement    Standing at RW:   HS curls 2 x 10  Mini squats 2x10    Supine:   3 x 30 hamstring stretch ea      Kylie participated in gait training for 23 minutes including:   Ambulating with rolling walker:   Sit<>stand with supervision. Ambulated a distance of 100 ft then 100 ft. She takes a seated rest break between trials. Close supervision for ambulation, pt demonstrates short  reciprocal steps and relies heavily on UEs for support. She reports at termination of trials that both UEs are fatigued is the reason she needs rest.       Pt exits session using RW for 50' of additional ambulation using the same pattern of gait as described above.    Kylie received cold pack for 10 minutes to bilateral feet. No skin breakdown noted prior to or following application.        Home Exercises Provided and Patient Education Provided      Education provided:   - Continue with HEP as tolerated     Written Home Exercises Provided: Patient instructed to cont prior HEP.  Exercises were reviewed and Kylie was able to demonstrate them prior to the end of the session.  Kylie demonstrated good  understanding of the education provided.      See EMR under Patient Instructions for exercises provided prior visit.     Assessment   Kylie tolerated treatment well. She reports decreased pain between sessions and tolerated standing therex well today. She continues to require frequent rest breaks due to heavy reliance on UEs for support during ambulation. Pt would benefit from standing trials without UE support. She remains appropriate for skilled PT services.        Kylie is progressing well towards her goals.   Pt prognosis is Fair.      Pt will continue to benefit from skilled outpatient physical therapy to address the deficits listed in the problem list box on initial evaluation, provide pt/family education and to maximize pt's level of independence in the home and community environment.      Pt's spiritual, cultural and educational needs considered and pt agreeable to plan of care and goals.     Anticipated barriers to physical therapy: LE weakness     Goals:      Short Term Goals:  2-3 weeks; updated 08/14/19  1.Report decreased in pain at worst less than <   / =  4  /10  to increase tolerance for functional activities. Ongoing reports 7/10 at worst  2. Pt to improve B knee range of motion by 8 degrees to  allow for improved functional mobility to allow for improvement in IADLs. MET  3. Increased B knee  MMT 1/2  to increase tolerance for ADL and work activities. MET  4. Pt to report an ability to perform stand pivot transfer into her sofa. MET   5. Pt to tolerate HEP to improve ROM and independence with ADL's. MET     Long Term Goals:  6-8 weeks  1. Report decreased in pain at worse less than  <   / =  2  /10  to increase tolerance for functional mobility. ongoing  2. Pt to improve B knee range of motion by 8 degrees to allow for improved functional mobility to allow for improvement in IADLs.  MET  3. Increased B knee MMT 1 grade  to increase tolerance for ADL and work activities. ongoing  4. Pt will scores report at CJ level (20-40% impaired) on LEFS  to demonstrate increase in LE function with every day tasks. progressing  5. Pt to be Independent with HEP to improve ROM and independence with ADL's. MET  6. New goal 8/14/2019: Pt will decrease score on TUG to </= 30 seconds in order to make her ambulation more functional. progressing         Plan      Continue to work on endurance training, strengthening, and increased B LE ROM. Achieve this by incorporating standing therex soon.        Jose R Baker,DPT  9/4/2019

## 2019-09-04 ENCOUNTER — CLINICAL SUPPORT (OUTPATIENT)
Dept: REHABILITATION | Facility: HOSPITAL | Age: 59
End: 2019-09-04
Attending: NEUROLOGICAL SURGERY
Payer: MEDICARE

## 2019-09-04 DIAGNOSIS — G89.29 BILATERAL CHRONIC KNEE PAIN: ICD-10-CM

## 2019-09-04 DIAGNOSIS — M79.672 BILATERAL FOOT PAIN: ICD-10-CM

## 2019-09-04 DIAGNOSIS — M25.562 BILATERAL CHRONIC KNEE PAIN: ICD-10-CM

## 2019-09-04 DIAGNOSIS — M25.561 BILATERAL CHRONIC KNEE PAIN: ICD-10-CM

## 2019-09-04 DIAGNOSIS — M79.671 BILATERAL FOOT PAIN: ICD-10-CM

## 2019-09-04 DIAGNOSIS — R26.81 UNSTEADY GAIT: ICD-10-CM

## 2019-09-04 DIAGNOSIS — R29.898 WEAKNESS OF LOWER EXTREMITY, UNSPECIFIED LATERALITY: ICD-10-CM

## 2019-09-04 PROCEDURE — 97110 THERAPEUTIC EXERCISES: CPT | Mod: PN

## 2019-09-04 PROCEDURE — 97116 GAIT TRAINING THERAPY: CPT | Mod: PN

## 2019-09-06 NOTE — PROGRESS NOTES
Physical Therapy Daily Treatment Note      Name: Kylie Aleman  Clinic Number: [id number]     Therapy Diagnosis:        Encounter Diagnoses   Name Primary?    Bilateral chronic knee pain      Bilateral foot pain Yes    Unsteady gait      Weakness of lower extremity, unspecified laterality        Physician: Mannie Hutchison MD     Visit Date: 9/9/2019      Physician Orders: PT Eval and Treat  Medical Diagnosis: pain in both lower extremities, gait instability  Evaluation Date: 5/9/19  Last PN: 8/14/2019  Authorization Period Expiration: 06/30/2020  New Plan of Care Certification Period: 7/17/2019 - 10/9/2019  Visit #/Visits authorized: 10/12 (New Referral)   Total Visits: 19      Time In: 901  Time Out: 956  Total Billable Time: 45 minutes     Precautions: Standard     Subjective      Pt reports: She is doing well today. She kept busy this weekend with various things and felt overall pretty good. Her L knee has been giving her trouble, feels like it wants to cramp.      She was compliant with home exercise program.  Response to previous treatment: felt good  Functional change: progressing towards goals.      Pain: 4/10, denied pain at conclusion of session  Location: L knee     Objective      BOLD = new exercise or exercise progression     Pt arrives in manual wc.     Kylie received therapeutic exercises to develop strength and endurance for 30 minutes including:     SciFit StepOne, level 2.5 for 10 minutes to increase cardiovascular endurance    Supine:   3 x 30 hamstring stretch ea  3 x 10 reps SLR with 3# weight donned  3x 15 reps bridges  3 x15 reps of hooklying clams with green theraband  3 x 30 seconds hamstring stretch    Kylie participated in gait training for 15 minutes including:   Ambulating with rolling walker:   Sit<>stand with supervision. Ambulated a distance of 115 ft then 80 ft. Today, pt requires standing rest breaks during ambulation trials. She takes a seated rest break between trials.  Close supervision for ambulation, pt demonstrates short reciprocal steps and relies heavily on UEs for support. She reports at termination of trials that both UEs are fatigued is the reason she needs rest.       Pt exits session using RW for 50' of additional ambulation using the same pattern of gait as described above.    Kylie received cold pack for 10 minutes to bilateral knees. No skin breakdown noted prior to or following application.        Home Exercises Provided and Patient Education Provided      Education provided:   - Continue with HEP as tolerated     Written Home Exercises Provided: Patient instructed to cont prior HEP.  Exercises were reviewed and Kylie was able to demonstrate them prior to the end of the session.  Kylie demonstrated good  understanding of the education provided.      See EMR under Patient Instructions for exercises provided prior visit.     Assessment   Kylie tolerated treatment fairly. She was more fatigued than normal today and required standing rest during ambulation. PT adds gastroc stretch in order to decrease tightness in L knee pt reports experiencing outside of therapy sessions. She was unable to perform standing therex today due to increased fatigue but tolerated supine therex well. She remains appropriate for skilled PT Services.     Kylie is progressing well towards her goals.   Pt prognosis is Fair.      Pt will continue to benefit from skilled outpatient physical therapy to address the deficits listed in the problem list box on initial evaluation, provide pt/family education and to maximize pt's level of independence in the home and community environment.      Pt's spiritual, cultural and educational needs considered and pt agreeable to plan of care and goals.     Anticipated barriers to physical therapy: LE weakness     Goals:      Short Term Goals:  2-3 weeks; updated 08/14/19  1.Report decreased in pain at worst less than <   / =  4  /10  to increase  tolerance for functional activities. Ongoing reports 7/10 at worst  2. Pt to improve B knee range of motion by 8 degrees to allow for improved functional mobility to allow for improvement in IADLs. MET  3. Increased B knee  MMT 1/2  to increase tolerance for ADL and work activities. MET  4. Pt to report an ability to perform stand pivot transfer into her sofa. MET   5. Pt to tolerate HEP to improve ROM and independence with ADL's. MET     Long Term Goals:  6-8 weeks  1. Report decreased in pain at worse less than  <   / =  2  /10  to increase tolerance for functional mobility. ongoing  2. Pt to improve B knee range of motion by 8 degrees to allow for improved functional mobility to allow for improvement in IADLs.  MET  3. Increased B knee MMT 1 grade  to increase tolerance for ADL and work activities. ongoing  4. Pt will scores report at CJ level (20-40% impaired) on LEFS  to demonstrate increase in LE function with every day tasks. progressing  5. Pt to be Independent with HEP to improve ROM and independence with ADL's. MET  6. New goal 8/14/2019: Pt will decrease score on TUG to </= 30 seconds in order to make her ambulation more functional. progressing         Plan      Continue to work on endurance training, strengthening, and increased B LE ROM. Achieve this by incorporating standing therex soon.        Jose R Baker,DPT  9/9/2019

## 2019-09-09 ENCOUNTER — CLINICAL SUPPORT (OUTPATIENT)
Dept: REHABILITATION | Facility: HOSPITAL | Age: 59
End: 2019-09-09
Attending: NEUROLOGICAL SURGERY
Payer: MEDICARE

## 2019-09-09 DIAGNOSIS — M79.672 BILATERAL FOOT PAIN: ICD-10-CM

## 2019-09-09 DIAGNOSIS — M79.671 BILATERAL FOOT PAIN: ICD-10-CM

## 2019-09-09 DIAGNOSIS — R26.81 UNSTEADY GAIT: ICD-10-CM

## 2019-09-09 DIAGNOSIS — R29.898 WEAKNESS OF LOWER EXTREMITY, UNSPECIFIED LATERALITY: ICD-10-CM

## 2019-09-09 DIAGNOSIS — G89.29 BILATERAL CHRONIC KNEE PAIN: ICD-10-CM

## 2019-09-09 DIAGNOSIS — M25.561 BILATERAL CHRONIC KNEE PAIN: ICD-10-CM

## 2019-09-09 DIAGNOSIS — M25.562 BILATERAL CHRONIC KNEE PAIN: ICD-10-CM

## 2019-09-09 PROCEDURE — 97116 GAIT TRAINING THERAPY: CPT | Mod: PN

## 2019-09-09 PROCEDURE — 97110 THERAPEUTIC EXERCISES: CPT | Mod: PN

## 2019-09-11 ENCOUNTER — CLINICAL SUPPORT (OUTPATIENT)
Dept: REHABILITATION | Facility: HOSPITAL | Age: 59
End: 2019-09-11
Attending: NEUROLOGICAL SURGERY
Payer: MEDICARE

## 2019-09-11 DIAGNOSIS — M79.672 BILATERAL FOOT PAIN: ICD-10-CM

## 2019-09-11 DIAGNOSIS — G89.29 BILATERAL CHRONIC KNEE PAIN: ICD-10-CM

## 2019-09-11 DIAGNOSIS — R29.898 WEAKNESS OF LOWER EXTREMITY, UNSPECIFIED LATERALITY: ICD-10-CM

## 2019-09-11 DIAGNOSIS — M25.561 BILATERAL CHRONIC KNEE PAIN: ICD-10-CM

## 2019-09-11 DIAGNOSIS — M79.671 BILATERAL FOOT PAIN: ICD-10-CM

## 2019-09-11 DIAGNOSIS — R26.81 UNSTEADY GAIT: Primary | ICD-10-CM

## 2019-09-11 DIAGNOSIS — M25.562 BILATERAL CHRONIC KNEE PAIN: ICD-10-CM

## 2019-09-11 PROCEDURE — 97116 GAIT TRAINING THERAPY: CPT | Mod: PN

## 2019-09-11 PROCEDURE — 97110 THERAPEUTIC EXERCISES: CPT | Mod: PN

## 2019-09-11 NOTE — PLAN OF CARE
Physical Therapy Daily Treatment Note      Name: Kylie Aleman  Clinic Number: [id number]     Therapy Diagnosis:        Encounter Diagnoses   Name Primary?    Bilateral chronic knee pain      Bilateral foot pain Yes    Unsteady gait      Weakness of lower extremity, unspecified laterality        Physician: Mannie Hutchison MD     Visit Date: 9/11/2019      Physician Orders: PT Eval and Treat  Medical Diagnosis: pain in both lower extremities, gait instability  Evaluation Date: 5/9/19  Last PN: 8/14/2019  Authorization Period Expiration: 06/30/2020  New Plan of Care Certification Period: 7/17/2019 - 10/9/2019  Visit #/Visits authorized: 11/12 (New Referral)   Total Visits: 19      Time In: 911 (pt arrives late, unable to accommodate)  Time Out: 955  Total Billable Time: 34 minutes     Precautions: Standard     Subjective      Pt reports: She is doing well today. She overslept, that is why she is late today. Her knees have been bothering her more than her feet lately.      She was compliant with home exercise program.  Response to previous treatment: felt good  Functional change: progressing towards goals.      Pain: 4/10, denied pain at conclusion of session  Location: L knee     Objective      BOLD = new exercise or exercise progression     Pt arrives in manual wc.     Kylie received therapeutic exercises to develop strength and endurance for 24 minutes including:     SciFit StepOne, level 2.5 for 10 minutes to increase cardiovascular endurance    Knee Range of Motion:    Left Passive 8/13/19 9/11 Right Passive 8/13/19 9/11   Flexion WNL WNL WNL WNL WNL WNL   Extension -8 -5 0 -13 -8 -4         Lower Extremity Strength                                   Right LE eval  8/14/19 9/11 Left LE eval  8/14/19 9/11   Knee extension: 4+/5 5/5 5/5 Knee extension: 4+/5 5/5 5/5   Knee flexion: 4+/5 4+/5 5/5 Knee flexion: 4+/5 4+/5 5/5   Hip flexion: 4+/5 5/5 5/5 Hip flexion: 4+/5 5/5 5/5   Hip Internal Rotation:  4/5     NT NT Hip Internal Rotation: 4/5    NT NT   Hip External Rotation: 4/5    NT NT Hip External Rotation: 4/5    NT NT   Hip extension:  (tested in sidelying) 4/5 4+/5 4+/5 Hip extension:  (tested in sidelying) 4-/5 4/5 4/5   Hip abduction: 4/5 4-/5 4/5 Hip abduction: 4-/5 4-/5 4-/5        Supine:   3 x 30 hamstring stretch ea  3x 15 reps bridges    Kylie participated in gait training for 10 minutes including:   Ambulating with rolling walker:   TU.5 seconds (previous: 54 seconds)  30 seconds sit to stand: 8 with B UE use, PT holds chair to ensure it does not tip    Pt exits session ambulating with RW, she relies heavily on UEs for support and uses short reciprocal steps. ~50'    Kylie received cold pack for 10 minutes to bilateral knees. No skin breakdown noted prior to or following application.        Home Exercises Provided and Patient Education Provided      Education provided:   - Continue with HEP as tolerated     Written Home Exercises Provided: Patient instructed to cont prior HEP.  Exercises were reviewed and Kylie was able to demonstrate them prior to the end of the session.  Kylie demonstrated good  understanding of the education provided.      See EMR under Patient Instructions for exercises provided prior visit.     Assessment   Kylie tolerated treatment well today. Assessment period 2019-2019. She continues to make steady progress in skilled PT services. She demonstrates improvement in TUG score and set a baseline for 30 second chair rise today. Pt continues to increase B LE strength but continues to demonstrate weakness in hip extension and abduction. She continues to demonstrate endurance defects and PT encourages pt to not rely on wc, to be up and moving more often in order to increase her endurance. She remains appropriate for skilled PT services in order to decrease her pain, increase her tolerance to activity, and decrease her risk for fall. Pts goals have been addressed and  remain appropriate for the next month of skilled PT services.     Kylie is progressing well towards her goals.   Pt prognosis is Fair.      Pt will continue to benefit from skilled outpatient physical therapy to address the deficits listed in the problem list box on initial evaluation, provide pt/family education and to maximize pt's level of independence in the home and community environment.      Pt's spiritual, cultural and educational needs considered and pt agreeable to plan of care and goals.     Anticipated barriers to physical therapy: LE weakness     Goals:      Short Term Goals:  2-3 weeks; updated 09/11/19  1.Report decreased in pain at worst less than <   / =  4  /10  to increase tolerance for functional activities. MET  2. Pt to improve B knee range of motion by 8 degrees to allow for improved functional mobility to allow for improvement in IADLs. MET  3. Increased B knee  MMT 1/2  to increase tolerance for ADL and work activities. MET  4. Pt to report an ability to perform stand pivot transfer into her sofa. MET   5. Pt to tolerate HEP to improve ROM and independence with ADL's. MET     Long Term Goals:  6-8 weeks  1. Report decreased in pain at worse less than  <   / =  2  /10  to increase tolerance for functional mobility. ongoing  2. Pt to improve B knee range of motion by 8 degrees to allow for improved functional mobility to allow for improvement in IADLs.  MET  3. Increased B knee MMT 1 grade  to increase tolerance for ADL and work activities. MET  4. Pt will scores report at CJ level (20-40% impaired) on LEFS  to demonstrate increase in LE function with every day tasks. progressing  5. Pt to be Independent with HEP to improve ROM and independence with ADL's. MET  6. New goal 8/14/2019: Pt will decrease score on TUG to </= 30 seconds in order to make her ambulation more functional. progressing         Plan      Focus on standing endurance by increasing amount of ambulation and incorporate  more standing therex into sessions.         Jose R Baker,DPT  9/11/2019

## 2019-09-17 ENCOUNTER — CLINICAL SUPPORT (OUTPATIENT)
Dept: REHABILITATION | Facility: HOSPITAL | Age: 59
End: 2019-09-17
Attending: NEUROLOGICAL SURGERY
Payer: MEDICARE

## 2019-09-17 DIAGNOSIS — M79.671 BILATERAL FOOT PAIN: ICD-10-CM

## 2019-09-17 DIAGNOSIS — M79.672 BILATERAL FOOT PAIN: ICD-10-CM

## 2019-09-17 DIAGNOSIS — M25.561 BILATERAL CHRONIC KNEE PAIN: ICD-10-CM

## 2019-09-17 DIAGNOSIS — M25.562 BILATERAL CHRONIC KNEE PAIN: ICD-10-CM

## 2019-09-17 DIAGNOSIS — G89.29 BILATERAL CHRONIC KNEE PAIN: ICD-10-CM

## 2019-09-17 DIAGNOSIS — R29.898 WEAKNESS OF BOTH LOWER EXTREMITIES: ICD-10-CM

## 2019-09-17 DIAGNOSIS — R26.81 UNSTEADY GAIT: ICD-10-CM

## 2019-09-17 PROCEDURE — 97116 GAIT TRAINING THERAPY: CPT | Mod: KX,PN | Performed by: PHYSICAL THERAPIST

## 2019-09-17 PROCEDURE — 97110 THERAPEUTIC EXERCISES: CPT | Mod: KX,PN | Performed by: PHYSICAL THERAPIST

## 2019-09-17 NOTE — PROGRESS NOTES
Physical Therapy Daily Treatment Note      Name: Kylie Aleman  Clinic Number: [id number]     Therapy Diagnosis:           Encounter Diagnoses   Name Primary?    Bilateral chronic knee pain      Bilateral foot pain Yes    Unsteady gait      Weakness of lower extremity, unspecified laterality        Physician: Mannie Hutchison MD     Visit Date: 9/17/2019      Physician Orders: PT Eval and Treat  Medical Diagnosis: pain in both lower extremities, gait instability  Evaluation Date: 5/9/19  Last PN: 9/11/2019  Authorization Period Expiration: 06/30/2020  New Plan of Care Certification Period: 7/17/2019 - 10/9/2019  Visit #/Visits authorized: 12/12 (New Referral)- KX modifier  Total Visits: 20     Time In: 900  Time Out: 0955  Total Billable Time: 45 minutes     Precautions: Standard     Subjective      Pt reports: no new complaints, pt eager to participate in PT      She was compliant with home exercise program.  Response to previous treatment: felt good  Functional change: progressing towards goals.      Pain: 4/10, denied at conclusion of session after cyrotherapy  Location: ECTOR knees and feet     Objective      BOLD = new exercise or exercise progression     Pt arrives in manual wc.      Kylie received therapeutic exercises to develop strength and endurance for 20 minutes including:      SciFit StepOne, level 2.5 for 10 minutes to increase cardiovascular endurance    Supine:   3 x 10 reps SLR with 3# weight donned  3x 15 reps bridges Green thera band for hip abduction    Sit<>supine with supervision  Sit<>stadn with supervision       Kylie participated in gait training for 25 minutes including:     Ambulated with RW 58 ft + 32 ft with close supervision- sitting rest between trials, increased reliance on UE support    Parallel bar: side stepping, BUE support- 2 laps with a sitting rest break between attempts     Kylie received cold pack for 10 minutes to bilateral knees. No skin breakdown noted prior to  or following application.        Home Exercises Provided and Patient Education Provided      Education provided:   - Continue with HEP as tolerated, pt encouraged to attempt walking to the bathroom when feasible to increase ambulation practice at home.      Written Home Exercises Provided: Patient instructed to cont prior HEP.  Exercises were reviewed and Kylie was able to demonstrate them prior to the end of the session.  Kylie demonstrated good  understanding of the education provided.      See EMR under Patient Instructions for exercises provided prior visit.     Assessment   Kylie tolerated treatment well today. Pt's ambulation with RW was limited today; per pt she walked to car to come to therapy and may be fatigued from that. Pt demonstrated good tolerance to increase in resistance for bridging and fair tolerance to increase in standing activities. ECTOR knee pain continues to limit standing and gait tolerance. Pt can benefit from continued skilled PT to improve transfers and gait ability.        Kylie is progressing well towards her goals.   Pt prognosis is Fair.      Pt will continue to benefit from skilled outpatient physical therapy to address the deficits listed in the problem list box on initial evaluation, provide pt/family education and to maximize pt's level of independence in the home and community environment.      Pt's spiritual, cultural and educational needs considered and pt agreeable to plan of care and goals.     Anticipated barriers to physical therapy: LE weakness     Goals:      Short Term Goals:  2-3 weeks; updated 09/11/19  1.Report decreased in pain at worst less than <   / =  4  /10  to increase tolerance for functional activities. MET  2. Pt to improve B knee range of motion by 8 degrees to allow for improved functional mobility to allow for improvement in IADLs. MET  3. Increased B knee  MMT 1/2  to increase tolerance for ADL and work activities. MET  4. Pt to report an ability  to perform stand pivot transfer into her sofa. MET   5. Pt to tolerate HEP to improve ROM and independence with ADL's. MET     Long Term Goals:  6-8 weeks  1. Report decreased in pain at worse less than  <   / =  2  /10  to increase tolerance for functional mobility. ongoing  2. Pt to improve B knee range of motion by 8 degrees to allow for improved functional mobility to allow for improvement in IADLs.  MET  3. Increased B knee MMT 1 grade  to increase tolerance for ADL and work activities. MET  4. Pt will scores report at CJ level (20-40% impaired) on LEFS  to demonstrate increase in LE function with every day tasks. progressing  5. Pt to be Independent with HEP to improve ROM and independence with ADL's. MET  6. New goal 8/14/2019: Pt will decrease score on TUG to </= 30 seconds in order to make her ambulation more functional. progressing         Plan      Focus on standing endurance by increasing amount of ambulation and incorporate more standing therex into sessions.         Yelena Ferrer,XANDER  9/17/2019

## 2019-09-19 ENCOUNTER — CLINICAL SUPPORT (OUTPATIENT)
Dept: REHABILITATION | Facility: HOSPITAL | Age: 59
End: 2019-09-19
Attending: NEUROLOGICAL SURGERY
Payer: MEDICARE

## 2019-09-19 DIAGNOSIS — M25.562 BILATERAL CHRONIC KNEE PAIN: ICD-10-CM

## 2019-09-19 DIAGNOSIS — G89.29 BILATERAL CHRONIC KNEE PAIN: ICD-10-CM

## 2019-09-19 DIAGNOSIS — R26.81 UNSTEADY GAIT: ICD-10-CM

## 2019-09-19 DIAGNOSIS — M79.672 BILATERAL FOOT PAIN: ICD-10-CM

## 2019-09-19 DIAGNOSIS — R29.898 WEAKNESS OF BOTH LOWER EXTREMITIES: ICD-10-CM

## 2019-09-19 DIAGNOSIS — M25.561 BILATERAL CHRONIC KNEE PAIN: ICD-10-CM

## 2019-09-19 DIAGNOSIS — M79.671 BILATERAL FOOT PAIN: ICD-10-CM

## 2019-09-19 PROCEDURE — 97116 GAIT TRAINING THERAPY: CPT | Mod: KX,PN

## 2019-09-19 PROCEDURE — 97110 THERAPEUTIC EXERCISES: CPT | Mod: KX,PN

## 2019-09-19 NOTE — PROGRESS NOTES
Physical Therapy Daily Treatment Note      Name: Kylie Aleman  Clinic Number: [id number]     Therapy Diagnosis:           Encounter Diagnoses   Name Primary?    Bilateral chronic knee pain      Bilateral foot pain Yes    Unsteady gait      Weakness of lower extremity, unspecified laterality        Physician: Mannie Hutchison MD     Visit Date: 9/23/2019      Physician Orders: PT Eval and Treat  Medical Diagnosis: pain in both lower extremities, gait instability  Evaluation Date: 5/9/19  Last PN: 9/11/2019  Authorization Period Expiration: 06/30/2020  New Plan of Care Certification Period: 7/17/2019 - 10/9/2019  Visit #/Visits authorized: 5/12 (New Referral)- KX modifier  Total Visits: 22     Time In: 730  Time Out: 830  Total Billable Time: 50 minutes (10 minutes on ice not billable)     Precautions: Standard     Subjective      Pt reports: Her knees were bothering her this weekend because of the rain. Overall doing well, no new complaints.      She was compliant with home exercise program.  Response to previous treatment: felt good  Functional change: progressing towards goals.      Pain: 4/10, denied at conclusion of session after cyrotherapy  Location: ECTOR knees     Objective      BOLD = new exercise or exercise progression     Pt arrives in manual wc.      Kylie received therapeutic exercises to develop strength and endurance for 25 minutes including:      SciFit StepOne, level 2.5 for 10 minutes to increase cardiovascular endurance    Supine:   3 x 15 reps SLR with 3# weight donned  3x 15 reps bridges, Green thera band for hip abduction     Standing:   3x30 seconds gastroc stretch on wedge    Sit<>supine at mod I  Sit<>stadn with supervision       Kylie participated in gait training for 25 minutes including:     Ambulated with RW 90 ft + 90 ft with close supervision- sitting rest between trials, increased reliance on UE support, decreased heel strike, short reciprocal steps.     Parallel bar: side  stepping, BUE support- 2 laps, no rest between laps     Kylie received cold pack for 10 minutes to bilateral knees. No skin breakdown noted prior to or following application.    Pt exited session using manual wc as well.         Home Exercises Provided and Patient Education Provided      Education provided:   - encouraging pt to ambulate more at home as with likely arthritic knees, more movement is better     Written Home Exercises Provided: Patient instructed to cont prior HEP.  Exercises were reviewed and Kylie was able to demonstrate them prior to the end of the session.  Kylie demonstrated good  understanding of the education provided.      See EMR under Patient Instructions for exercises provided prior visit.     Assessment   Kylie tolerated treatment fair today. She required more frequent rest breaks today due to pain in her knees. Pt reports more intense stretching with standing stretching on wedge today. She remains appropriate for skilled PT services and will continue to benefit from PT in order to increase her overall mobility outside of therapy.       Kylie is progressing well towards her goals.   Pt prognosis is Fair.      Pt will continue to benefit from skilled outpatient physical therapy to address the deficits listed in the problem list box on initial evaluation, provide pt/family education and to maximize pt's level of independence in the home and community environment.      Pt's spiritual, cultural and educational needs considered and pt agreeable to plan of care and goals.     Anticipated barriers to physical therapy: LE weakness     Goals:      Short Term Goals:  2-3 weeks; updated 09/11/19  1.Report decreased in pain at worst less than <   / =  4  /10  to increase tolerance for functional activities. MET  2. Pt to improve B knee range of motion by 8 degrees to allow for improved functional mobility to allow for improvement in IADLs. MET  3. Increased B knee  MMT 1/2  to increase  tolerance for ADL and work activities. MET  4. Pt to report an ability to perform stand pivot transfer into her sofa. MET   5. Pt to tolerate HEP to improve ROM and independence with ADL's. MET     Long Term Goals:  6-8 weeks  1. Report decreased in pain at worse less than  <   / =  2  /10  to increase tolerance for functional mobility. ongoing  2. Pt to improve B knee range of motion by 8 degrees to allow for improved functional mobility to allow for improvement in IADLs.  MET  3. Increased B knee MMT 1 grade  to increase tolerance for ADL and work activities. MET  4. Pt will scores report at CJ level (20-40% impaired) on LEFS  to demonstrate increase in LE function with every day tasks. progressing  5. Pt to be Independent with HEP to improve ROM and independence with ADL's. MET  6. New goal 8/14/2019: Pt will decrease score on TUG to </= 30 seconds in order to make her ambulation more functional. progressing         Plan      Focus on standing endurance by increasing amount of ambulation and incorporate more standing therex into sessions.         Jose R Baker,XANDER  9/23/2019

## 2019-09-19 NOTE — PROGRESS NOTES
Physical Therapy Daily Treatment Note      Name: Kylie Aleman  Clinic Number: [id number]     Therapy Diagnosis:           Encounter Diagnoses   Name Primary?    Bilateral chronic knee pain      Bilateral foot pain Yes    Unsteady gait      Weakness of lower extremity, unspecified laterality        Physician: Mannie Hutchison MD     Visit Date: 9/19/2019      Physician Orders: PT Eval and Treat  Medical Diagnosis: pain in both lower extremities, gait instability  Evaluation Date: 5/9/19  Last PN: 9/11/2019  Authorization Period Expiration: 06/30/2020  New Plan of Care Certification Period: 7/17/2019 - 10/9/2019  Visit #/Visits authorized: 4/12 (New Referral)- KX modifier  Total Visits: 21     Time In: 730  Time Out: 815  Total Billable Time: 45 minutes (10 minutes on ice not billable)     Precautions: Standard     Subjective      Pt reports: no new complaints, pt eager to participate in PT      She was compliant with home exercise program.  Response to previous treatment: felt good  Functional change: progressing towards goals.      Pain: 4/10, denied at conclusion of session after cyrotherapy  Location: [facility] knees and feet (L>R)     Objective      BOLD = new exercise or exercise progression     Pt arrives in manual wc.      Kylie received therapeutic exercises to develop strength and endurance for 20 minutes including:      SciFit StepOne, level 2.5 for 10 minutes to increase cardiovascular endurance    Supine:   3 x 10 reps SLR with 3# weight donned  3x 15 reps bridges, Green thera band for hip abduction    Standing:   3x10 heel raises    Sit<>supine at mod I  Sit<>stadn with supervision       [place] participated in gait training for 25 minutes including:     Ambulated with  ft + 100 ft with close supervision- sitting rest between trials, increased reliance on UE support, decreased heel strike, short reciprocal steps    Following first ambulation trial, pt reports feeling lightheaded; therefore BP  taken: 112/64 mmHg, HR: 81bpm, R arm auto cuff    Parallel bar: side stepping, BUE support- 2 laps with a sitting rest break between attempts     Kylie received cold pack for 10 minutes to bilateral knees. No skin breakdown noted prior to or following application.        Home Exercises Provided and Patient Education Provided      Education provided:   - Continue with HEP as tolerated, pt encouraged to attempt walking to the bathroom when feasible to increase ambulation practice at home.      Written Home Exercises Provided: Patient instructed to cont prior HEP.  Exercises were reviewed and Kylie was able to demonstrate them prior to the end of the session.  Kylie demonstrated good  understanding of the education provided.      See EMR under Patient Instructions for exercises provided prior visit.     Assessment   Kylie tolerated treatment well today. Pt was better able to perform ambulation today, but felt lightheaded following first ambulation trial. Her knee pain continues to limit her standing tolerance. She tolerates therex well as was able to tolerate one additional standing exercise today. Pt can benefit from continued skilled PT to improve transfers and gait ability.        Kylie is progressing well towards her goals.   Pt prognosis is Fair.      Pt will continue to benefit from skilled outpatient physical therapy to address the deficits listed in the problem list box on initial evaluation, provide pt/family education and to maximize pt's level of independence in the home and community environment.      Pt's spiritual, cultural and educational needs considered and pt agreeable to plan of care and goals.     Anticipated barriers to physical therapy: LE weakness     Goals:      Short Term Goals:  2-3 weeks; updated 09/11/19  1.Report decreased in pain at worst less than <   / =  4  /10  to increase tolerance for functional activities. MET  2. Pt to improve B knee range of motion by 8 degrees to allow  for improved functional mobility to allow for improvement in IADLs. MET  3. Increased B knee  MMT 1/2  to increase tolerance for ADL and work activities. MET  4. Pt to report an ability to perform stand pivot transfer into her sofa. MET   5. Pt to tolerate HEP to improve ROM and independence with ADL's. MET     Long Term Goals:  6-8 weeks  1. Report decreased in pain at worse less than  <   / =  2  /10  to increase tolerance for functional mobility. ongoing  2. Pt to improve B knee range of motion by 8 degrees to allow for improved functional mobility to allow for improvement in IADLs.  MET  3. Increased B knee MMT 1 grade  to increase tolerance for ADL and work activities. MET  4. Pt will scores report at CJ level (20-40% impaired) on LEFS  to demonstrate increase in LE function with every day tasks. progressing  5. Pt to be Independent with HEP to improve ROM and independence with ADL's. MET  6. New goal 8/14/2019: Pt will decrease score on TUG to </= 30 seconds in order to make her ambulation more functional. progressing         Plan      Focus on standing endurance by increasing amount of ambulation and incorporate more standing therex into sessions.         Jose R Baker,DPT  9/19/2019

## 2019-09-23 ENCOUNTER — CLINICAL SUPPORT (OUTPATIENT)
Dept: REHABILITATION | Facility: HOSPITAL | Age: 59
End: 2019-09-23
Attending: NEUROLOGICAL SURGERY
Payer: MEDICARE

## 2019-09-23 DIAGNOSIS — M79.672 BILATERAL FOOT PAIN: Primary | ICD-10-CM

## 2019-09-23 DIAGNOSIS — R29.898 WEAKNESS OF BOTH LOWER EXTREMITIES: ICD-10-CM

## 2019-09-23 DIAGNOSIS — M79.671 BILATERAL FOOT PAIN: Primary | ICD-10-CM

## 2019-09-23 DIAGNOSIS — R26.81 UNSTEADY GAIT: ICD-10-CM

## 2019-09-23 DIAGNOSIS — G89.29 BILATERAL CHRONIC KNEE PAIN: ICD-10-CM

## 2019-09-23 DIAGNOSIS — M25.562 BILATERAL CHRONIC KNEE PAIN: ICD-10-CM

## 2019-09-23 DIAGNOSIS — M25.561 BILATERAL CHRONIC KNEE PAIN: ICD-10-CM

## 2019-09-23 PROCEDURE — 97116 GAIT TRAINING THERAPY: CPT | Mod: PN

## 2019-09-23 PROCEDURE — 97110 THERAPEUTIC EXERCISES: CPT | Mod: PN

## 2019-09-24 NOTE — PROGRESS NOTES
"Physical Therapy Daily Treatment Note      Name: Kylie Aleman  Clinic Number: [id number]     Therapy Diagnosis:           Encounter Diagnoses   Name Primary?    Bilateral chronic knee pain      Bilateral foot pain Yes    Unsteady gait      Weakness of lower extremity, unspecified laterality        Physician: Mannie Hutchison MD     Visit Date: 9/26/2019      Physician Orders: PT Eval and Treat  Medical Diagnosis: pain in both lower extremities, gait instability  Evaluation Date: 5/9/19  Last PN: 9/11/2019  Authorization Period Expiration: 06/30/2020  New Plan of Care Certification Period: 7/17/2019 - 10/9/2019  Visit #/Visits authorized: 6/12 (New Referral)- KX modifier  Total Visits: 23     Time In: 728  Time Out: 823  Total Billable Time: 55 minutes (10 minutes on ice not billable)     Precautions: Standard     Subjective      Pt reports: She is feeling stiff this morning. She had a "procedure" yesterday. Pt reports that she had a same day procedure yesterday for her spine and that she asked the doctor who said to return to normal activities.       She was compliant with home exercise program.  Response to previous treatment: felt good  Functional change: progressing towards goals.      Pain: 7/10, denied at conclusion of session after cyrotherapy  Location: [facility] knees and feet     Objective      BOLD = new exercise or exercise progression     Pt arrives in manual wc.      Kylie received therapeutic exercises to develop strength and endurance for 30 minutes including:      SciFit StepOne, level 2.5 for 11 minutes to increase cardiovascular endurance    Supine:   3 x 10 reps SLR with 4# weight donned  3x 15 reps bridges, Green thera band for hip abduction  SAQ with 4# weight donned and 3 seconds hold at top     Standing:   3x30 seconds gastroc stretch on wedge    Sit<>supine at mod I  Sit<>stadn with supervision       Kylie participated in gait training for 15 minutes including:     Ambulated with RW 58 " ft + 42 ft with close supervision- sitting rest between trials, increased reliance on UE support, decreased heel strike, short reciprocal steps.      Kylie received cold pack for 10 minutes to bilateral knees. No skin breakdown noted prior to or following application.    Pt exited session using manual wc as well.         Home Exercises Provided and Patient Education Provided      Education provided:   - encouraging pt to ambulate more at home as with likely arthritic knees, more movement is better     Written Home Exercises Provided: Patient instructed to cont prior HEP.  Exercises were reviewed and Kylie was able to demonstrate them prior to the end of the session.  Kylie demonstrated good  understanding of the education provided.      See EMR under Patient Instructions for exercises provided prior visit.     Assessment   Kylie tolerated treatment fair today. She was not able to tolerate gait nor side stepping in // bars today due to soreness and pain from procedure yesterday. Pt was able to tolerate increased weight for SLR without complaints. She continues to use short reciprocal steps with gait and relies heavily on UEs. Pt with increased pain today compared to normal due to procedure yesterday. She remains appropriate for skilled PT services in order to improve her overall mobility.      Kylie is progressing well towards her goals.   Pt prognosis is Fair.      Pt will continue to benefit from skilled outpatient physical therapy to address the deficits listed in the problem list box on initial evaluation, provide pt/family education and to maximize pt's level of independence in the home and community environment.      Pt's spiritual, cultural and educational needs considered and pt agreeable to plan of care and goals.     Anticipated barriers to physical therapy: LE weakness     Goals:      Short Term Goals:  2-3 weeks; updated 09/11/19  1.Report decreased in pain at worst less than <   / =  4  /10  to  increase tolerance for functional activities. MET  2. Pt to improve B knee range of motion by 8 degrees to allow for improved functional mobility to allow for improvement in IADLs. MET  3. Increased B knee  MMT 1/2  to increase tolerance for ADL and work activities. MET  4. Pt to report an ability to perform stand pivot transfer into her sofa. MET   5. Pt to tolerate HEP to improve ROM and independence with ADL's. MET     Long Term Goals:  6-8 weeks  1. Report decreased in pain at worse less than  <   / =  2  /10  to increase tolerance for functional mobility. ongoing  2. Pt to improve B knee range of motion by 8 degrees to allow for improved functional mobility to allow for improvement in IADLs.  MET  3. Increased B knee MMT 1 grade  to increase tolerance for ADL and work activities. MET  4. Pt will scores report at CJ level (20-40% impaired) on LEFS  to demonstrate increase in LE function with every day tasks. progressing  5. Pt to be Independent with HEP to improve ROM and independence with ADL's. MET  6. New goal 8/14/2019: Pt will decrease score on TUG to </= 30 seconds in order to make her ambulation more functional. progressing         Plan      Focus on standing endurance by increasing amount of ambulation and incorporate more standing therex into sessions.         Jose R Baker,DPT  9/26/2019

## 2019-09-26 ENCOUNTER — CLINICAL SUPPORT (OUTPATIENT)
Dept: REHABILITATION | Facility: HOSPITAL | Age: 59
End: 2019-09-26
Attending: NEUROLOGICAL SURGERY
Payer: MEDICARE

## 2019-09-26 DIAGNOSIS — M79.672 BILATERAL FOOT PAIN: ICD-10-CM

## 2019-09-26 DIAGNOSIS — G89.29 BILATERAL CHRONIC KNEE PAIN: ICD-10-CM

## 2019-09-26 DIAGNOSIS — R26.81 UNSTEADY GAIT: ICD-10-CM

## 2019-09-26 DIAGNOSIS — M25.562 BILATERAL CHRONIC KNEE PAIN: ICD-10-CM

## 2019-09-26 DIAGNOSIS — M25.561 BILATERAL CHRONIC KNEE PAIN: ICD-10-CM

## 2019-09-26 DIAGNOSIS — R29.898 WEAKNESS OF BOTH LOWER EXTREMITIES: ICD-10-CM

## 2019-09-26 DIAGNOSIS — M79.671 BILATERAL FOOT PAIN: ICD-10-CM

## 2019-09-26 PROCEDURE — 97116 GAIT TRAINING THERAPY: CPT | Mod: KX,PN

## 2019-09-26 PROCEDURE — 97110 THERAPEUTIC EXERCISES: CPT | Mod: KX,PN

## 2019-09-27 NOTE — PROGRESS NOTES
"Physical Therapy Daily Treatment Note      Name: Kylie Aleman  Clinic Number: [id number]     Therapy Diagnosis:           Encounter Diagnoses   Name Primary?    Bilateral chronic knee pain      Bilateral foot pain Yes    Unsteady gait      Weakness of lower extremity, unspecified laterality        Physician: Mannie Hutchison MD     Visit Date: 9/30/2019      Physician Orders: PT Eval and Treat  Medical Diagnosis: pain in both lower extremities, gait instability  Evaluation Date: 5/9/19  Last PN: 9/11/2019  Authorization Period Expiration: 06/30/2020  New Plan of Care Certification Period: 7/17/2019 - 10/9/2019  Visit #/Visits authorized: 7/12 (New Referral)- KX modifier  Total Visits: 24     Time In: 730  Time Out: 825  Total Billable Time: 45 minutes (10 minutes on ice not billable)     Precautions: Standard     Subjective      Pt reports: She is doing okay this morning. Her dad went into the hospital over the weekend. She feels as though her pain is a little "lighter" since the procedure.     She was compliant with home exercise program.  Response to previous treatment: felt good  Functional change: progressing towards goals.      Pain: 3/10, denied at conclusion of session after cyrotherapy  Location: [facility] knees and feet     Objective      BOLD = new exercise or exercise progression     Pt arrives in manual wc.      Kylie received therapeutic exercises to develop strength and endurance for 30 minutes including:      SciFit StepOne, level 3.0 for 11 minutes to increase cardiovascular endurance    Supine:   3 x 15 reps SLR with 4# weight donned  3 x 15 reps bridges, Green thera band for hip abduction  3 x 15 reps, SAQ with 4# weight donned and 3 seconds hold at top     Standing:   3x30 seconds gastroc stretch on wedge    Sit<>supine at mod I  Sit<>stand with supervision     Kylie participated in gait training for 15 minutes including:     Ambulated with  ft + 100 ft with close supervision- sitting " rest between trials, increased reliance on UE support, decreased heel strike, short reciprocal steps.     Parallel bar: side stepping, BUE support- 3 laps, no rest between laps     Kylie received cold pack for 10 minutes to bilateral knees. No skin breakdown noted prior to or following application.    Pt exited session using manual wc as well.         Home Exercises Provided and Patient Education Provided      Education provided:   - encouraging pt to ambulate more at home as with likely arthritic knees, more movement is better     Written Home Exercises Provided: Patient instructed to cont prior HEP.  Exercises were reviewed and Kylie was able to demonstrate them prior to the end of the session.  Kylie demonstrated good  understanding of the education provided.      See EMR under Patient Instructions for exercises provided prior visit.     Assessment   Kylie tolerated treatment well today. She was able to ambulate farther than last session and tolerated standing activities better today. She reports that pain has been slightly improved since her procedure. She continues to struggle with overall endurance, but tolerates supine therex well.  She remains appropriate for skilled PT services in order to improve her overall mobility.      Kylie is progressing well towards her goals.   Pt prognosis is Fair.      Pt will continue to benefit from skilled outpatient physical therapy to address the deficits listed in the problem list box on initial evaluation, provide pt/family education and to maximize pt's level of independence in the home and community environment.      Pt's spiritual, cultural and educational needs considered and pt agreeable to plan of care and goals.     Anticipated barriers to physical therapy: LE weakness     Goals:      Short Term Goals:  2-3 weeks; updated 09/11/19  1.Report decreased in pain at worst less than <   / =  4  /10  to increase tolerance for functional activities. MET  2. Pt to  improve B knee range of motion by 8 degrees to allow for improved functional mobility to allow for improvement in IADLs. MET  3. Increased B knee  MMT 1/2  to increase tolerance for ADL and work activities. MET  4. Pt to report an ability to perform stand pivot transfer into her sofa. MET   5. Pt to tolerate HEP to improve ROM and independence with ADL's. MET     Long Term Goals:  6-8 weeks  1. Report decreased in pain at worse less than  <   / =  2  /10  to increase tolerance for functional mobility. ongoing  2. Pt to improve B knee range of motion by 8 degrees to allow for improved functional mobility to allow for improvement in IADLs.  MET  3. Increased B knee MMT 1 grade  to increase tolerance for ADL and work activities. MET  4. Pt will scores report at CJ level (20-40% impaired) on LEFS  to demonstrate increase in LE function with every day tasks. progressing  5. Pt to be Independent with HEP to improve ROM and independence with ADL's. MET  6. New goal 8/14/2019: Pt will decrease score on TUG to </= 30 seconds in order to make her ambulation more functional. progressing         Plan      Focus on standing endurance by increasing amount of ambulation and incorporate more standing therex into sessions.         Jose R Baker,DPT  9/30/2019

## 2019-09-30 ENCOUNTER — CLINICAL SUPPORT (OUTPATIENT)
Dept: REHABILITATION | Facility: HOSPITAL | Age: 59
End: 2019-09-30
Attending: NEUROLOGICAL SURGERY
Payer: MEDICARE

## 2019-09-30 DIAGNOSIS — M25.562 BILATERAL CHRONIC KNEE PAIN: ICD-10-CM

## 2019-09-30 DIAGNOSIS — R26.81 UNSTEADY GAIT: ICD-10-CM

## 2019-09-30 DIAGNOSIS — M79.672 BILATERAL FOOT PAIN: ICD-10-CM

## 2019-09-30 DIAGNOSIS — R29.898 WEAKNESS OF BOTH LOWER EXTREMITIES: ICD-10-CM

## 2019-09-30 DIAGNOSIS — M79.671 BILATERAL FOOT PAIN: ICD-10-CM

## 2019-09-30 DIAGNOSIS — M25.561 BILATERAL CHRONIC KNEE PAIN: ICD-10-CM

## 2019-09-30 DIAGNOSIS — G89.29 BILATERAL CHRONIC KNEE PAIN: ICD-10-CM

## 2019-09-30 PROCEDURE — 97116 GAIT TRAINING THERAPY: CPT | Mod: KX,PN

## 2019-09-30 PROCEDURE — 97110 THERAPEUTIC EXERCISES: CPT | Mod: KX,PN

## 2019-10-01 NOTE — PROGRESS NOTES
Physical Therapy Daily Treatment Note      Name: Kylie Aleman  Clinic Number: [id number]     Therapy Diagnosis:           Encounter Diagnoses   Name Primary?    Bilateral chronic knee pain      Bilateral foot pain Yes    Unsteady gait      Weakness of lower extremity, unspecified laterality        Physician: Mannie Hutchison MD     Visit Date: 10/2/2019      Physician Orders: PT Eval and Treat  Medical Diagnosis: pain in both lower extremities, gait instability  Evaluation Date: 19  Last PN: 2019  Authorization Period Expiration: 2020  New Plan of Care Certification Period: 2019 - 10/9/2019  Visit #/Visits authorized:  (New Referral)- KX modifier  Total Visits: 25     Time In: 728  Time Out: 823  Total Billable Time: 45 minutes (10 minutes on ice not billable)     Precautions: Standard     Subjective      Pt reports: She is doing well today. She spent most of the day Monday at the hospital and yesterday was able to clean out her laundry room. Her pain overall seems to be decreasing.     She was compliant with home exercise program.  Response to previous treatment: felt good  Functional change: progressing towards goals.      Pain: 3/10, denied at conclusion of session after cyrotherapy  Location: ECT[facility] knees and feet     Objective      BOLD = new exercise or exercise progression     Pt arrives in manual wc.      Kylie received therapeutic exercises to develop strength and endurance for 30 minutes including:      SciFit StepOne using hills on sprint setting, level 3.0 for 10 minutes to increase cardiovascular endurance    Supine:   3 x 15 reps bridges, Green thera band for hip abduction  3 x 15 reps, SAQ with 4# weight donned and 3 seconds hold at top     Standin:30 seconds gastroc stretch on wedge  Hip flexion 2 x 10  Hip abduction 2 x10     Sit<>supine at mod I  Sit<>stand with mod I    Kyile participated in gait training for 15 minutes including:     Ambulated with  ft  with close supervision- sitting rest following trials, forward flexed at the hips, increased reliance on UE support, decreased heel strike, short reciprocal steps.     Parallel bar: side stepping, BUE support- 3 laps, no rest between laps, SBA   Backwards ambulation, 1 lap SBA     Kylie received cold pack for 10 minutes to bilateral knees. No skin breakdown noted prior to or following application.    Pt exited session using manual wc as well.         Home Exercises Provided and Patient Education Provided      Education provided:   - encouraging pt to ambulate into and out of therapy sessions     Written Home Exercises Provided: Patient instructed to cont prior HEP.  Exercises were reviewed and Kylie was able to demonstrate them prior to the end of the session.  Kylie demonstrated good  understanding of the education provided.      See EMR under Patient Instructions for exercises provided prior visit.     Assessment   Kylie tolerated treatment well today. She was able to ambulate farther today but only completed one trial of ambulation. She continues to demonstrate knee flexion throughout gait cycle, though better tolerated standing activities today. She continues to report ability to do more at home. PT encourages pt to ambulate into and out of therapy sessions, rather than use wc. She remains appropriate for skilled PT services in order to improve her overall mobility.      Kylie is progressing well towards her goals.   Pt prognosis is Fair.      Pt will continue to benefit from skilled outpatient physical therapy to address the deficits listed in the problem list box on initial evaluation, provide pt/family education and to maximize pt's level of independence in the home and community environment.      Pt's spiritual, cultural and educational needs considered and pt agreeable to plan of care and goals.     Anticipated barriers to physical therapy: LE weakness     Goals:      Short Term Goals:  2-3 weeks;  updated 09/11/19  1.Report decreased in pain at worst less than <   / =  4  /10  to increase tolerance for functional activities. MET  2. Pt to improve B knee range of motion by 8 degrees to allow for improved functional mobility to allow for improvement in IADLs. MET  3. Increased B knee  MMT 1/2  to increase tolerance for ADL and work activities. MET  4. Pt to report an ability to perform stand pivot transfer into her sofa. MET   5. Pt to tolerate HEP to improve ROM and independence with ADL's. MET     Long Term Goals:  6-8 weeks  1. Report decreased in pain at worse less than  <   / =  2  /10  to increase tolerance for functional mobility. ongoing  2. Pt to improve B knee range of motion by 8 degrees to allow for improved functional mobility to allow for improvement in IADLs.  MET  3. Increased B knee MMT 1 grade  to increase tolerance for ADL and work activities. MET  4. Pt will scores report at CJ level (20-40% impaired) on LEFS  to demonstrate increase in LE function with every day tasks. progressing  5. Pt to be Independent with HEP to improve ROM and independence with ADL's. MET  6. New goal 8/14/2019: Pt will decrease score on TUG to </= 30 seconds in order to make her ambulation more functional. progressing         Plan      Focus on standing endurance by increasing amount of ambulation and incorporate more standing therex into sessions.         Jose R Baker,DPT  10/2/2019

## 2019-10-02 ENCOUNTER — CLINICAL SUPPORT (OUTPATIENT)
Dept: REHABILITATION | Facility: HOSPITAL | Age: 59
End: 2019-10-02
Attending: NEUROLOGICAL SURGERY
Payer: MEDICARE

## 2019-10-02 DIAGNOSIS — M79.671 BILATERAL FOOT PAIN: ICD-10-CM

## 2019-10-02 DIAGNOSIS — M79.672 BILATERAL FOOT PAIN: ICD-10-CM

## 2019-10-02 DIAGNOSIS — R29.898 WEAKNESS OF BOTH LOWER EXTREMITIES: ICD-10-CM

## 2019-10-02 DIAGNOSIS — M25.561 BILATERAL CHRONIC KNEE PAIN: ICD-10-CM

## 2019-10-02 DIAGNOSIS — R26.81 UNSTEADY GAIT: Primary | ICD-10-CM

## 2019-10-02 DIAGNOSIS — G89.29 BILATERAL CHRONIC KNEE PAIN: ICD-10-CM

## 2019-10-02 DIAGNOSIS — M25.562 BILATERAL CHRONIC KNEE PAIN: ICD-10-CM

## 2019-10-02 PROCEDURE — 97110 THERAPEUTIC EXERCISES: CPT | Mod: KX,PN

## 2019-10-02 PROCEDURE — 97116 GAIT TRAINING THERAPY: CPT | Mod: KX,PN

## 2019-10-02 PROCEDURE — 97112 NEUROMUSCULAR REEDUCATION: CPT | Mod: KX,PN

## 2019-10-09 ENCOUNTER — CLINICAL SUPPORT (OUTPATIENT)
Dept: REHABILITATION | Facility: HOSPITAL | Age: 59
End: 2019-10-09
Attending: NEUROLOGICAL SURGERY
Payer: MEDICARE

## 2019-10-09 DIAGNOSIS — M25.562 BILATERAL CHRONIC KNEE PAIN: ICD-10-CM

## 2019-10-09 DIAGNOSIS — M25.561 BILATERAL CHRONIC KNEE PAIN: ICD-10-CM

## 2019-10-09 DIAGNOSIS — R29.898 WEAKNESS OF BOTH LOWER EXTREMITIES: ICD-10-CM

## 2019-10-09 DIAGNOSIS — M79.672 BILATERAL FOOT PAIN: ICD-10-CM

## 2019-10-09 DIAGNOSIS — G89.29 BILATERAL CHRONIC KNEE PAIN: ICD-10-CM

## 2019-10-09 DIAGNOSIS — R26.81 UNSTEADY GAIT: ICD-10-CM

## 2019-10-09 DIAGNOSIS — M79.671 BILATERAL FOOT PAIN: ICD-10-CM

## 2019-10-09 PROCEDURE — G8978 MOBILITY CURRENT STATUS: HCPCS | Mod: CL,PN

## 2019-10-09 PROCEDURE — 97110 THERAPEUTIC EXERCISES: CPT | Mod: KX,PN

## 2019-10-09 NOTE — PLAN OF CARE
Physical Therapy Daily Treatment Note      Name: Kylie Aleman  Clinic Number: [id number]     Therapy Diagnosis:           Encounter Diagnoses   Name Primary?    Bilateral chronic knee pain      Bilateral foot pain Yes    Unsteady gait      Weakness of lower extremity, unspecified laterality        Physician: Mannie Hutchison MD     Visit Date: 10/9/2019      Physician Orders: PT Eval and Treat  Medical Diagnosis: pain in both lower extremities, gait instability  Evaluation Date: 5/9/19  Last PN: 10/9/2019  Authorization Period Expiration: 06/30/2020  New Plan of Care Certification Period: 10/9/2019 - 12/9/2019  Visit #/Visits authorized: 9/12 (New Referral)- KX modifier  Total Visits: 26     Time In: 728  Time Out: 823  Total Billable Time: 45 minutes (10 minutes on ice not billable)     Precautions: Standard     Subjective      Pt reports: She is doing okay today. She wasn't able to get a ride on Monday to come to therapy. Her L knee has been bothering her since Saturday.      She was compliant with home exercise program.  Response to previous treatment: felt good  Functional change: progressing towards goals.      Pain: 6/10, denied at conclusion of session after cyrotherapy  Location: L knee     Objective      BOLD = new exercise or exercise progression     Pt arrives in manual wc, slouched posture.      Kylie received therapeutic exercises to develop strength and endurance for 45 minutes including:      Knee Range of Motion:    Left Passive 8/13/19 9/11 10/9 Right Passive 8/13/19 9/11 10/9   Flexion WNL WNL WNL WNL WNL WNL WNL WNL   Extension -8 -5 0 0 -13 -8 -4 -2     Lower Extremity Strength                                                    Right LE eval  8/14/19 9/11 10/9 Left LE eval  8/14/19 9/11 10/9   Knee extension: 4+/5 5/5 5/5 5/5 Knee extension: 4+/5 5/5 5/5 5/5   Knee flexion: 4+/5 4+/5 5/5 5/5 Knee flexion: 4+/5 4+/5 5/5 4+/5   Hip flexion: 4+/5 5/5 5/5 5/5 Hip flexion: 4+/5 5/5 5/5 5/5    Hip Internal Rotation:  4/5    NT NT NT Hip Internal Rotation: 4/5    NT NT NT   Hip External Rotation: 4/5    NT NT NT Hip External Rotation: 4/5    NT NT NT   Hip extension:  (tested in sidelying) 4/5 4+/5 4+/5 4/5 Hip extension:  (tested in sidelying) 4-/5 4/5 4/5 4/5   Hip abduction: 4/5 4-/5 4/5 4/5 Hip abduction: 4-/5 4-/5 4-/5 4-/5      TU seconds + 37.5 seconds = 40.8 seconds (previous: 41.5 seconds)  30 seconds sit to stand: 14 with B UE use (previous score: 8 with B UE use)    SciFit StepOne using hills on sprint setting, level 3.0 for 10 minutes to increase cardiovascular endurance    Supine:   3 x 15 reps bridges, Green thera band for hip abduction  3 x 15 reps, SAQ with 4# weight donned and 3 seconds hold at top  3 x 15 SLR with 4# weight donned mann      Sit<>supine at mod I  Sit<>stand with mod I    Kylie participated in gait training for 0 minutes including:      Kylie received cold pack for 10 minutes to bilateral knees. No skin breakdown noted prior to or following application.    Pt exited session using manual wc as well.        CMS Impairment/Limitation/Restriction for FOTO Status Survey    Therapist reviewed FOTO scores for Kylie Aleman on 10/9/2019.   FOTO documents entered into [employer] - see Media section.    Limitation Score: 70%  Category: Mobility    Current : CL = least 60% but < 80% impaired, limited or restricted  Goal: CK = at least 40% but < 60% impaired, limited or restricted            Home Exercises Provided and Patient Education Provided      Education provided:   - encouraging pt to ambulate into and out of therapy sessions  - education on increasing movement for arthritic joints     Written Home Exercises Provided: Patient instructed to cont prior HEP.  Exercises were reviewed and Kylie was able to demonstrate them prior to the end of the session.  Kylie demonstrated good  understanding of the education provided.      See EMR under Patient Instructions for  exercises provided prior visit.     Assessment   Kylie tolerated treatment well today. Assessment period 9/11/2019-10/9/2019: Ms. Aleman has made minimal progress during the last month of skilled PT services. She was able to slightly increase TUG score but MMT and ROM scores remain the same. She continues to present to therapy session in wheelchair, even after PT encouragement to ambulate more outside of sessions. Pt continues to struggle with pain in B feet and knees that is reduced since start of care but overall remains unchanged during the last few months. She remains appropriate for skilled PT services in order to reach remaining goals and increase her functioning outside of sessions. PT to extend POC through 12/9/2019.      Kylie is progressing well towards her goals.   Pt prognosis is Fair.      Pt will continue to benefit from skilled outpatient physical therapy to address the deficits listed in the problem list box on initial evaluation, provide pt/family education and to maximize pt's level of independence in the home and community environment.      Pt's spiritual, cultural and educational needs considered and pt agreeable to plan of care and goals.     Anticipated barriers to physical therapy: LE weakness     Goals:      Short Term Goals:  2-3 weeks; updated 09/11/19  1.Report decreased in pain at worst less than <   / =  4  /10  to increase tolerance for functional activities. MET previously, inconsistent  2. Pt to improve B knee range of motion by 8 degrees to allow for improved functional mobility to allow for improvement in IADLs. MET  3. Increased B knee  MMT 1/2  to increase tolerance for ADL and work activities. MET  4. Pt to report an ability to perform stand pivot transfer into her sofa. MET   5. Pt to tolerate HEP to improve ROM and independence with ADL's. MET     Long Term Goals:  6-8 weeks  1. Report decreased in pain at worse less than  <   / =  2  /10  to increase tolerance for  functional mobility. ongoing  2. Pt to improve B knee range of motion by 8 degrees to allow for improved functional mobility to allow for improvement in IADLs.  MET  3. Increased B knee MMT 1 grade  to increase tolerance for ADL and work activities. MET  4. Pt will scores report at CJ level (20-40% impaired) on LEFS  to demonstrate increase in LE function with every day tasks. progressing  5. Pt to be Independent with HEP to improve ROM and independence with ADL's. MET  6. New goal 8/14/2019: Pt will decrease score on TUG to </= 30 seconds in order to make her ambulation more functional. progressing         Plan   New Plan of Care Certification Period: 10/9/2019 - 12/9/2019  Focus on standing and functional tasks. Continue to attempt to increase ambulation distance.       Jose R Baker,SHAHIDT  10/9/2019

## 2019-10-14 ENCOUNTER — CLINICAL SUPPORT (OUTPATIENT)
Dept: REHABILITATION | Facility: HOSPITAL | Age: 59
End: 2019-10-14
Attending: NEUROLOGICAL SURGERY
Payer: MEDICARE

## 2019-10-14 DIAGNOSIS — M25.561 BILATERAL CHRONIC KNEE PAIN: ICD-10-CM

## 2019-10-14 DIAGNOSIS — R29.898 WEAKNESS OF BOTH LOWER EXTREMITIES: ICD-10-CM

## 2019-10-14 DIAGNOSIS — G89.29 BILATERAL CHRONIC KNEE PAIN: ICD-10-CM

## 2019-10-14 DIAGNOSIS — R26.81 UNSTEADY GAIT: ICD-10-CM

## 2019-10-14 DIAGNOSIS — M79.672 BILATERAL FOOT PAIN: ICD-10-CM

## 2019-10-14 DIAGNOSIS — M25.562 BILATERAL CHRONIC KNEE PAIN: ICD-10-CM

## 2019-10-14 DIAGNOSIS — M79.671 BILATERAL FOOT PAIN: ICD-10-CM

## 2019-10-14 PROCEDURE — 97110 THERAPEUTIC EXERCISES: CPT | Mod: KX,PN

## 2019-10-14 PROCEDURE — 97116 GAIT TRAINING THERAPY: CPT | Mod: KX,PN

## 2019-10-14 NOTE — PROGRESS NOTES
"Physical Therapy Daily Treatment Note      Name: Kylie Aleman  Clinic Number: [id number]     Therapy Diagnosis:           Encounter Diagnoses   Name Primary?    Bilateral chronic knee pain      Bilateral foot pain Yes    Unsteady gait      Weakness of lower extremity, unspecified laterality        Physician: Mannie Hutchison MD     Visit Date: 10/14/2019      Physician Orders: PT Eval and Treat  Medical Diagnosis: pain in both lower extremities, gait instability  Evaluation Date: 19  Last PN: 2019  Authorization Period Expiration: 2020  New Plan of Care Certification Period: 2019 - 10/9/2019  Visit #/Visits authorized: 10/12 (New Referral)- KX modifier  Total Visits: 27     Time In: 741  Time Out: 830  Total Billable Time: 39 minutes (10 minutes on ice not billable)     Precautions: Standard     Subjective      Pt reports: She had a terrible weekend. Her feet were hurting so bad. She would rate it at a 10/10 all weekend. They were red and hot. She did not try icing them but rubbed some muscle relaxer on them to make it a little better.      She was compliant with home exercise program.  Response to previous treatment: felt good  Functional change: progressing towards goals.      Pain: 7/10, 6/10 at conclusion of session after cyrotherapy  Location: ECT[facility] feet     Objective      BOLD = new exercise or exercise progression     Pt arrives in manual wc.      Kylie received therapeutic exercises to develop strength and endurance for 25 minutes including:      SciFit StepOne using hills on sprint setting, level 3.0 for 10 minutes to increase cardiovascular endurance     Standin:00 minutes gastroc stretch on wedge  Hip flexion 2 x 10  Hip abduction 2 x10  Heel raises 2 x10  Step up onto 4" step, x5 ea LE lead    Kylie participated in gait training for 14 minutes including:     Ambulated with  ft with supervision- sitting rest following trial, forward flexed at the hips, increased " reliance on UE support, decreased heel strike, short reciprocal steps.     Parallel bar: side stepping, BUE support- 3 laps, no rest between laps, SBA   Backwards ambulation, 1 lap SBA     Kylie received cold pack for 10 minutes to bilateral feet. No skin breakdown noted prior to or following application.    Pt exited session using manual wc as well.       Home Exercises Provided and Patient Education Provided      Education provided:   - encouraging pt to ambulate into and out of therapy sessions, as well as more at home     Written Home Exercises Provided: Patient instructed to cont prior HEP.  Exercises were reviewed and Kylie was able to demonstrate them prior to the end of the session.  Kylie demonstrated good  understanding of the education provided.      See EMR under Patient Instructions for exercises provided prior visit.     Assessment   Kylie tolerated treatment well today. She continues to struggle with standing tolerance and reports increased pain in B feet today as well as over the weekend. PT encourages pt to get ice or frozen peas for at home when her pain increases to assist in lowering pain. She tolerated addition of more standing activities well today, and she will continue to benefit from being challenged with more standing each session. She remains appropriate for skilled PT services.      Kylie is progressing well towards her goals.   Pt prognosis is Fair.      Pt will continue to benefit from skilled outpatient physical therapy to address the deficits listed in the problem list box on initial evaluation, provide pt/family education and to maximize pt's level of independence in the home and community environment.      Pt's spiritual, cultural and educational needs considered and pt agreeable to plan of care and goals.     Anticipated barriers to physical therapy: LE weakness     Goals:      Short Term Goals:  2-3 weeks; updated 09/11/19  1.Report decreased in pain at worst less than  <   / =  4  /10  to increase tolerance for functional activities. MET previously, inconsistent  2. Pt to improve B knee range of motion by 8 degrees to allow for improved functional mobility to allow for improvement in IADLs. MET  3. Increased B knee  MMT 1/2  to increase tolerance for ADL and work activities. MET  4. Pt to report an ability to perform stand pivot transfer into her sofa. MET   5. Pt to tolerate HEP to improve ROM and independence with ADL's. MET     Long Term Goals:  6-8 weeks  1. Report decreased in pain at worse less than  <   / =  2  /10  to increase tolerance for functional mobility. ongoing  2. Pt to improve B knee range of motion by 8 degrees to allow for improved functional mobility to allow for improvement in IADLs.  MET  3. Increased B knee MMT 1 grade  to increase tolerance for ADL and work activities. MET  4. Pt will scores report at CJ level (20-40% impaired) on LEFS  to demonstrate increase in LE function with every day tasks. progressing  5. Pt to be Independent with HEP to improve ROM and independence with ADL's. MET  6. New goal 8/14/2019: Pt will decrease score on TUG to </= 30 seconds in order to make her ambulation more functional. progressing         Plan   New Plan of Care Certification Period: 10/9/2019 - 12/9/2019  Focus on standing and functional tasks. Continue to attempt to increase ambulation distance.    Jose R Baker,DPT  10/14/2019

## 2019-10-15 ENCOUNTER — TELEPHONE (OUTPATIENT)
Dept: NEUROLOGY | Facility: CLINIC | Age: 59
End: 2019-10-15

## 2019-10-15 NOTE — TELEPHONE ENCOUNTER
Can you put in new PT orders     ----- Message from Lizzette Shin sent at 10/15/2019  8:52 AM CDT -----  Contact: Self   Type: Patient Call Back    Who called: self    What is the request in detail:  Patient is asking for another referral for PT because the old one is . She says it has to be sent to [employer] her insurance company.   Can the clinic reply by [facility]?  Call  Would the patient rather a call back or a response via [facility]?  Call   Best call back number: [phone]    Additional Information:

## 2019-10-15 NOTE — PROGRESS NOTES
"Physical Therapy Daily Treatment Note      Name: Kylie Aleman  Clinic Number: [id number]     Therapy Diagnosis:           Encounter Diagnoses   Name Primary?    Bilateral chronic knee pain      Bilateral foot pain Yes    Unsteady gait      Weakness of lower extremity, unspecified laterality        Physician: Mannie Hutchison MD     Visit Date: 10/16/2019      Physician Orders: PT Eval and Treat  Medical Diagnosis: pain in both lower extremities, gait instability  Evaluation Date: 19  Last PN: 10/9/2019  Authorization Period Expiration: 2020  New Plan of Care Certification Period: 2019 - 10/9/2019  Visit #/Visits authorized:  (New Referral)- KX modifier  Total Visits: 28     Time In: 730  Time Out: 821  Total Billable Time: 41 minutes (10 minutes on ice not billable)     Precautions: Standard     Subjective      Pt reports: She went to get frozen veggies so that she can ice her feet at home. The rain today is making her hurt more but hopefully once it goes she will get some relief.      She was compliant with home exercise program.  Response to previous treatment: felt good  Functional change: progressing towards goals.      Pain: 6/10, 6/10 at conclusion of session after cyrotherapy  Location: ECTOR feet     Objective      BOLD = new exercise or exercise progression     Pt arrives in manual wc, PT encourages pt to ambulate from waiting area into treatment area with success.      Kylie received therapeutic exercises to develop strength and endurance for 26 minutes including:      SciFit StepOne using hills on sprint setting, level 3.0 for 10 minutes to increase cardiovascular endurance     Standin:00 minutes gastroc stretch on wedge  Hip flexion 2 x 10  Hip abduction 2 x10, yellow theraband  Hip extension, 2 x 10 yellow theraband   Heel raises 3 x10  Step up onto 4" step, x5 ea LE lead    Kylie participated in gait training for 15 minutes including:     Ambulated with RW " approximately 150 ft with supervision- sitting rest following trial, forward flexed at the hips, increased reliance on UE support, decreased heel strike, short reciprocal steps.     Parallel bar: side stepping, BUE support- 3 laps, no rest between laps, SBA   Backwards ambulation, 2 laps SBA     Kylie received cold pack for 10 minutes to bilateral feet. No skin breakdown noted prior to or following application.    Pt exited session using manual wc.       Home Exercises Provided and Patient Education Provided      Education provided:   - encouraging pt to ambulate into and out of therapy sessions, as well as more at home     Written Home Exercises Provided: Patient instructed to cont prior HEP.  Exercises were reviewed and Kylie was able to demonstrate them prior to the end of the session.  Kylie demonstrated good  understanding of the education provided.      See EMR under Patient Instructions for exercises provided prior visit.     Assessment   Kylie tolerated treatment well today. She tolerates addition of more standing activity each session well though continues to require frequent seated rest beaks. Pt reports decreased pain with use of cryotherapy and is encouraged to use ice at home to decrease pain. She remains appropriate for skilled PT services.      Kylie is progressing well towards her goals.   Pt prognosis is Fair.      Pt will continue to benefit from skilled outpatient physical therapy to address the deficits listed in the problem list box on initial evaluation, provide pt/family education and to maximize pt's level of independence in the home and community environment.      Pt's spiritual, cultural and educational needs considered and pt agreeable to plan of care and goals.     Anticipated barriers to physical therapy: LE weakness     Goals:      Short Term Goals:  2-3 weeks; updated 09/11/19  1.Report decreased in pain at worst less than <   / =  4  /10  to increase tolerance for functional  activities. MET previously, inconsistent  2. Pt to improve B knee range of motion by 8 degrees to allow for improved functional mobility to allow for improvement in IADLs. MET  3. Increased B knee  MMT 1/2  to increase tolerance for ADL and work activities. MET  4. Pt to report an ability to perform stand pivot transfer into her sofa. MET   5. Pt to tolerate HEP to improve ROM and independence with ADL's. MET     Long Term Goals:  6-8 weeks  1. Report decreased in pain at worse less than  <   / =  2  /10  to increase tolerance for functional mobility. ongoing  2. Pt to improve B knee range of motion by 8 degrees to allow for improved functional mobility to allow for improvement in IADLs.  MET  3. Increased B knee MMT 1 grade  to increase tolerance for ADL and work activities. MET  4. Pt will scores report at CJ level (20-40% impaired) on LEFS  to demonstrate increase in LE function with every day tasks. progressing  5. Pt to be Independent with HEP to improve ROM and independence with ADL's. MET  6. New goal 8/14/2019: Pt will decrease score on TUG to </= 30 seconds in order to make her ambulation more functional. progressing         Plan   New Plan of Care Certification Period: 10/9/2019 - 12/9/2019  Focus on standing and functional tasks. Continue to attempt to increase ambulation distance.    Jose R Baker,SHAHIDT  10/16/2019

## 2019-10-16 ENCOUNTER — CLINICAL SUPPORT (OUTPATIENT)
Dept: REHABILITATION | Facility: HOSPITAL | Age: 59
End: 2019-10-16
Attending: NEUROLOGICAL SURGERY
Payer: MEDICARE

## 2019-10-16 DIAGNOSIS — M25.562 BILATERAL CHRONIC KNEE PAIN: ICD-10-CM

## 2019-10-16 DIAGNOSIS — G89.29 BILATERAL CHRONIC KNEE PAIN: ICD-10-CM

## 2019-10-16 DIAGNOSIS — M79.671 BILATERAL FOOT PAIN: ICD-10-CM

## 2019-10-16 DIAGNOSIS — M25.561 BILATERAL CHRONIC KNEE PAIN: ICD-10-CM

## 2019-10-16 DIAGNOSIS — R29.898 WEAKNESS OF BOTH LOWER EXTREMITIES: ICD-10-CM

## 2019-10-16 DIAGNOSIS — M79.672 BILATERAL FOOT PAIN: ICD-10-CM

## 2019-10-16 DIAGNOSIS — R26.81 UNSTEADY GAIT: ICD-10-CM

## 2019-10-16 PROCEDURE — 97110 THERAPEUTIC EXERCISES: CPT | Mod: PN

## 2019-10-16 PROCEDURE — 97116 GAIT TRAINING THERAPY: CPT | Mod: PN

## 2019-10-22 NOTE — PROGRESS NOTES
"Physical Therapy Daily Treatment Note      Name: Kylie Aleman  Clinic Number: [id number]     Therapy Diagnosis:           Encounter Diagnoses   Name Primary?    Bilateral chronic knee pain      Bilateral foot pain Yes    Unsteady gait      Weakness of lower extremity, unspecified laterality        Physician: Mannie Hutchison MD     Visit Date: 10/23/2019      Physician Orders: PT Eval and Treat  Medical Diagnosis: pain in both lower extremities, gait instability  Evaluation Date: 19  Last PN: 10/9/2019  Authorization Period Expiration: 2020  New Plan of Care Certification Period: 2019 - 10/9/2019  Visit #/Visits authorized: 12/15 (New Referral)- KX modifier  Total Visits: 29     Time In: 730  Time Out: 827  Total Billable Time: 47 minutes (10 minutes on ice not billable)     Precautions: Standard     Subjective      Pt reports: She is sad she had to miss her appointment on Monday because insurance approval was pending. Her pain was a little worse over the weekend, but overall feeling okay today.     She was compliant with home exercise program.  Response to previous treatment: felt good  Functional change: progressing towards goals.      Pain: 5/10, 6/10 at conclusion of session after cyrotherapy  Location: [facility]     Objective      BOLD = new exercise or exercise progression     Pt arrives in manual wc, slouched posture. Pt's sister pushes pt into clinic and pt pushes wc from waiting area into treatment area.      Kylie received therapeutic exercises to develop strength and endurance for 25 minutes including:      SciFit StepOne using hills on sprint setting, level 3.0 for 10 minutes to increase cardiovascular endurance     Standin:00 minutes gastroc stretch on wedge  Hip flexion 2 x 10  Hip abduction 2 x10, yellow theraband  Hip extension, 2 x 10 yellow theraband   Heel raises 3 x10  Step up onto 4" step, x5 ea LE lead    Kylie participated in gait training for 22 minutes " including:     Ambulated with RW approximately 100 ft then an additional 100 ft with supervision- sitting rest following each trial, forward flexed at the hips, increased reliance on UE support, decreased heel strike, short reciprocal steps.     Parallel bar: side stepping, BUE support- 3 laps, no rest between laps, SBA   Backwards ambulation, 2 laps SBA   Fwd ambulation with 1 UE support only, CGA, 1 lap     Kylie received cold pack for 10 minutes to bilateral feet. No skin breakdown noted prior to or following application.    Pt exited session using manual wc.       Home Exercises Provided and Patient Education Provided      Education provided:   - encouraging pt to ambulate into and out of therapy sessions, as well as more at home     Written Home Exercises Provided: Patient instructed to cont prior HEP.  Exercises were reviewed and Kylie was able to demonstrate them prior to the end of the session.  Kylie demonstrated good  understanding of the education provided.      See EMR under Patient Instructions for exercises provided prior visit.     Assessment   Kylie tolerated treatment well today. She continues to tolerate more standing each session. PT again encourages pt to stand more outside of sessions. She requires extended rest breaks between ambulation trials, though she was able to complete two trials today. She remains appropriate for skilled PT services.      Kylie is progressing well towards her goals.   Pt prognosis is Fair.      Pt will continue to benefit from skilled outpatient physical therapy to address the deficits listed in the problem list box on initial evaluation, provide pt/family education and to maximize pt's level of independence in the home and community environment.      Pt's spiritual, cultural and educational needs considered and pt agreeable to plan of care and goals.     Anticipated barriers to physical therapy: LE weakness     Goals:      Short Term Goals:  2-3 weeks;  updated 09/11/19  1.Report decreased in pain at worst less than <   / =  4  /10  to increase tolerance for functional activities. MET previously, inconsistent  2. Pt to improve B knee range of motion by 8 degrees to allow for improved functional mobility to allow for improvement in IADLs. MET  3. Increased B knee  MMT 1/2  to increase tolerance for ADL and work activities. MET  4. Pt to report an ability to perform stand pivot transfer into her sofa. MET   5. Pt to tolerate HEP to improve ROM and independence with ADL's. MET     Long Term Goals:  6-8 weeks  1. Report decreased in pain at worse less than  <   / =  2  /10  to increase tolerance for functional mobility. ongoing  2. Pt to improve B knee range of motion by 8 degrees to allow for improved functional mobility to allow for improvement in IADLs.  MET  3. Increased B knee MMT 1 grade  to increase tolerance for ADL and work activities. MET  4. Pt will scores report at CJ level (20-40% impaired) on LEFS  to demonstrate increase in LE function with every day tasks. progressing  5. Pt to be Independent with HEP to improve ROM and independence with ADL's. MET  6. New goal 8/14/2019: Pt will decrease score on TUG to </= 30 seconds in order to make her ambulation more functional. progressing         Plan   New Plan of Care Certification Period: 10/9/2019 - 12/9/2019  Focus on standing and functional tasks. Continue to attempt to increase ambulation distance.    Jose R Baker,DPT  10/23/2019

## 2019-10-23 ENCOUNTER — CLINICAL SUPPORT (OUTPATIENT)
Dept: REHABILITATION | Facility: HOSPITAL | Age: 59
End: 2019-10-23
Attending: NEUROLOGICAL SURGERY
Payer: MEDICARE

## 2019-10-23 DIAGNOSIS — M79.672 BILATERAL FOOT PAIN: ICD-10-CM

## 2019-10-23 DIAGNOSIS — M25.561 BILATERAL CHRONIC KNEE PAIN: ICD-10-CM

## 2019-10-23 DIAGNOSIS — M25.562 BILATERAL CHRONIC KNEE PAIN: ICD-10-CM

## 2019-10-23 DIAGNOSIS — R29.898 WEAKNESS OF BOTH LOWER EXTREMITIES: ICD-10-CM

## 2019-10-23 DIAGNOSIS — M79.671 BILATERAL FOOT PAIN: ICD-10-CM

## 2019-10-23 DIAGNOSIS — G89.29 BILATERAL CHRONIC KNEE PAIN: ICD-10-CM

## 2019-10-23 DIAGNOSIS — R26.81 UNSTEADY GAIT: ICD-10-CM

## 2019-10-23 PROCEDURE — 97110 THERAPEUTIC EXERCISES: CPT | Mod: KX,PN

## 2019-10-23 PROCEDURE — 97116 GAIT TRAINING THERAPY: CPT | Mod: KX,PN

## 2019-10-25 NOTE — PROGRESS NOTES
"Physical Therapy Daily Treatment Note      Name: Kylie Aleman  Clinic Number: [id number]     Therapy Diagnosis:           Encounter Diagnoses   Name Primary?    Bilateral chronic knee pain      Bilateral foot pain Yes    Unsteady gait      Weakness of lower extremity, unspecified laterality        Physician: Mannie Hutchison MD     Visit Date: 10/28/2019      Physician Orders: PT Eval and Treat  Medical Diagnosis: pain in both lower extremities, gait instability  Evaluation Date: 19  Last PN: 10/9/2019  Authorization Period Expiration: 10/20/2020  New Plan of Care Certification Period: 2019 - 10/9/2019  Visit #/Visits authorized:  (New Referral)- KX modifier  Total Visits: 30     Time In: 731  Time Out: 826  Total Billable Time: 45 minutes (10 minutes on ice not billable)     Precautions: Standard     Subjective      Pt reports: She is doing well this morning, her feet did not hurt her too bad over the weekend. Pt states that today her knees and feet aren't bothering her but her back is what is limiting her.     She was compliant with home exercise program.  Response to previous treatment: felt good  Functional change: progressing towards goals.      Pain: 4/10  Location: low back     Objective      BOLD = new exercise or exercise progression     Pt arrives in manual wc, slouched posture. Pt's sister pushes pt into clinic and pt pushes wc from waiting area into treatment area.      Kylie received therapeutic exercises to develop strength and endurance for 25 minutes including:      SciFit StepOne using hills on [place] setting, level 3.5 for 10 minutes to increase cardiovascular endurance    Standin:00 minutes gastroc stretch on wedge  Hip flexion 2 x 10  Hip abduction 2 x10, yellow theraband  Hip extension, 2 x 10 yellow theraband   Heel raises 3 x10  Step up onto 4" step, 2x5 ea LE lead, B UE support    Kylie participated in gait training for 20 minutes including:     Ambulated " with RW approximately 100 ft then an additional 100 ft with supervision- sitting rest following each trial, forward flexed at the hips, increased reliance on UE support, decreased heel strike, short reciprocal steps.     Parallel bar:    side stepping, BUE support- 3 laps, no rest between laps, SBA   Backwards ambulation, 2 laps SBA   Fwd ambulation with 1 UE support only, CGA, 1 lap     Kylie received cold pack for 10 minutes to low back. No skin breakdown noted prior to or following application.    Pt exited session using manual wc.       Home Exercises Provided and Patient Education Provided      Education provided:   - encouraging pt to ambulate into and out of therapy sessions, as well as more at home     Written Home Exercises Provided: Patient instructed to cont prior HEP.  Exercises were reviewed and Kylie was able to demonstrate them prior to the end of the session.  Kylie demonstrated good  understanding of the education provided.      See EMR under Patient Instructions for exercises provided prior visit.     Assessment   Kylie tolerated treatment well today. She continues to tolerate more standing each session. Pt reports that she does not feel comfortable ambulating at home because she is home along majority of the time, PT relay to pt that she is able to ambulate household distances. Pt continues to struggle with endurance with standing tasks, though improving. She remains appropriate for skilled PT services.      Kylie is progressing well towards her goals.   Pt prognosis is Fair.      Pt will continue to benefit from skilled outpatient physical therapy to address the deficits listed in the problem list box on initial evaluation, provide pt/family education and to maximize pt's level of independence in the home and community environment.      Pt's spiritual, cultural and educational needs considered and pt agreeable to plan of care and goals.     Anticipated barriers to physical therapy: WHITNEY  weakness     Goals:      Short Term Goals:  2-3 weeks; updated 09/11/19  1.Report decreased in pain at worst less than <   / =  4  /10  to increase tolerance for functional activities. MET previously, inconsistent  2. Pt to improve B knee range of motion by 8 degrees to allow for improved functional mobility to allow for improvement in IADLs. MET  3. Increased B knee  MMT 1/2  to increase tolerance for ADL and work activities. MET  4. Pt to report an ability to perform stand pivot transfer into her sofa. MET   5. Pt to tolerate HEP to improve ROM and independence with ADL's. MET     Long Term Goals:  6-8 weeks  1. Report decreased in pain at worse less than  <   / =  2  /10  to increase tolerance for functional mobility. ongoing  2. Pt to improve B knee range of motion by 8 degrees to allow for improved functional mobility to allow for improvement in IADLs.  MET  3. Increased B knee MMT 1 grade  to increase tolerance for ADL and work activities. MET  4. Pt will scores report at CJ level (20-40% impaired) on LEFS  to demonstrate increase in LE function with every day tasks. progressing  5. Pt to be Independent with HEP to improve ROM and independence with ADL's. MET  6. New goal 8/14/2019: Pt will decrease score on TUG to </= 30 seconds in order to make her ambulation more functional. progressing         Plan   New Plan of Care Certification Period: 10/9/2019 - 12/9/2019  Focus on standing and functional tasks. Continue to attempt to increase ambulation distance.    Jose R Baker,DPT  10/28/2019

## 2019-10-28 ENCOUNTER — CLINICAL SUPPORT (OUTPATIENT)
Dept: REHABILITATION | Facility: HOSPITAL | Age: 59
End: 2019-10-28
Attending: NEUROLOGICAL SURGERY
Payer: MEDICARE

## 2019-10-28 DIAGNOSIS — M25.562 BILATERAL CHRONIC KNEE PAIN: ICD-10-CM

## 2019-10-28 DIAGNOSIS — G89.29 BILATERAL CHRONIC KNEE PAIN: ICD-10-CM

## 2019-10-28 DIAGNOSIS — M25.561 BILATERAL CHRONIC KNEE PAIN: ICD-10-CM

## 2019-10-28 DIAGNOSIS — R29.898 WEAKNESS OF BOTH LOWER EXTREMITIES: ICD-10-CM

## 2019-10-28 DIAGNOSIS — M79.672 BILATERAL FOOT PAIN: ICD-10-CM

## 2019-10-28 DIAGNOSIS — R26.81 UNSTEADY GAIT: ICD-10-CM

## 2019-10-28 DIAGNOSIS — M79.671 BILATERAL FOOT PAIN: ICD-10-CM

## 2019-10-28 PROCEDURE — 97116 GAIT TRAINING THERAPY: CPT | Mod: KX,PN

## 2019-10-28 PROCEDURE — 97110 THERAPEUTIC EXERCISES: CPT | Mod: KX,PN

## 2019-10-29 NOTE — PROGRESS NOTES
"Physical Therapy Daily Treatment Note      Name: Kylie Aleman  Clinic Number: [id number]     Therapy Diagnosis:           Encounter Diagnoses   Name Primary?    Bilateral chronic knee pain      Bilateral foot pain Yes    Unsteady gait      Weakness of lower extremity, unspecified laterality        Physician: Mannie Hutchison MD     Visit Date: 10/30/2019      Physician Orders: PT Eval and Treat  Medical Diagnosis: pain in both lower extremities, gait instability  Evaluation Date: 19  Last PN: 10/9/2019  Authorization Period Expiration: 10/20/2020  New Plan of Care Certification Period: 2019 - 10/9/2019  Visit #/Visits authorized:  (New Referral)- KX modifier  Total Visits: 31     Time In: 733 (pt arrives late)  Time Out: 826  Total Billable Time: 43 minutes (10 minutes on ice not billable)     Precautions: Standard     Subjective      Pt reports: She is doing well this morning, her feet are not hurting as much as they have in the past.      She was compliant with home exercise program.  Response to previous treatment: felt good  Functional change: progressing towards goals.      Pain: 3/10  Location: [facility] feet     Objective      BOLD = new exercise or exercise progression     Pt arrives in manual wc, slouched posture. Pt's sister pushes pt into clinic and pt pushes wc from waiting area into treatment area.      Kylie received therapeutic exercises to develop strength and endurance for 25 minutes including:      SciFit StepOne using hills on sprint setting, level 3.5 for 10 minutes to increase cardiovascular endurance    Standin:00 minutes gastroc stretch on wedge  Hip flexion 2 x 10, yellow theraband  Hip abduction 2 x10, yellow theraband  Hip extension, 2 x 10 yellow theraband   Heel raises 3 x10  Step up onto 4" step, 2x5 ea LE lead, B UE support    Kylie participated in gait training for 20 minutes including:     Ambulated with RW approximately 200 ft then an additional 100 ft at mod I- " sitting rest following each trial, forward flexed at the hips, increased reliance on UE support, decreased heel strike, short reciprocal steps.     Parallel bar:    side stepping, BUE support- 3 laps, no rest between laps, SBA   Backwards ambulation, 2 laps SBA   Fwd ambulation with 1 UE support only, CGA, 1 lap     Kylie received cold pack for 10 minutes to low back. No skin breakdown noted prior to or following application.    Pt exited session using manual wc.       Home Exercises Provided and Patient Education Provided      Education provided:   - encouraging pt to ambulate into and out of therapy sessions, as well as more at home     Written Home Exercises Provided: Patient instructed to cont prior HEP.  Exercises were reviewed and Kylie was able to demonstrate them prior to the end of the session.  Kylie demonstrated good  understanding of the education provided.      See EMR under Patient Instructions for exercises provided prior visit.     Assessment   Kylie tolerated treatment well today. She continues to tolerate more standing each session. Pt reports that despite having a week break from PT she will attempt to do standing therex at home. Pt able to increase ambulation distance today and informed that her walking is safe to perform at home as she feels comfortable. She remains appropriate for skilled PT services.      Kylie is progressing well towards her goals.   Pt prognosis is Fair.      Pt will continue to benefit from skilled outpatient physical therapy to address the deficits listed in the problem list box on initial evaluation, provide pt/family education and to maximize pt's level of independence in the home and community environment.      Pt's spiritual, cultural and educational needs considered and pt agreeable to plan of care and goals.     Anticipated barriers to physical therapy: LE weakness     Goals:      Short Term Goals:  2-3 weeks; updated 09/11/19  1.Report decreased in pain at  worst less than <   / =  4  /10  to increase tolerance for functional activities. MET previously, inconsistent  2. Pt to improve B knee range of motion by 8 degrees to allow for improved functional mobility to allow for improvement in IADLs. MET  3. Increased B knee  MMT 1/2  to increase tolerance for ADL and work activities. MET  4. Pt to report an ability to perform stand pivot transfer into her sofa. MET   5. Pt to tolerate HEP to improve ROM and independence with ADL's. MET     Long Term Goals:  6-8 weeks  1. Report decreased in pain at worse less than  <   / =  2  /10  to increase tolerance for functional mobility. ongoing  2. Pt to improve B knee range of motion by 8 degrees to allow for improved functional mobility to allow for improvement in IADLs.  MET  3. Increased B knee MMT 1 grade  to increase tolerance for ADL and work activities. MET  4. Pt will scores report at CJ level (20-40% impaired) on LEFS  to demonstrate increase in LE function with every day tasks. progressing  5. Pt to be Independent with HEP to improve ROM and independence with ADL's. MET  6. New goal 8/14/2019: Pt will decrease score on TUG to </= 30 seconds in order to make her ambulation more functional. progressing         Plan   New Plan of Care Certification Period: 10/9/2019 - 12/9/2019  Focus on standing and functional tasks. Continue to attempt to increase ambulation distance.    Jose R Baker,DPT  10/30/2019

## 2019-10-30 ENCOUNTER — CLINICAL SUPPORT (OUTPATIENT)
Dept: REHABILITATION | Facility: HOSPITAL | Age: 59
End: 2019-10-30
Attending: NEUROLOGICAL SURGERY
Payer: MEDICARE

## 2019-10-30 DIAGNOSIS — R29.898 WEAKNESS OF BOTH LOWER EXTREMITIES: ICD-10-CM

## 2019-10-30 DIAGNOSIS — G89.29 BILATERAL CHRONIC KNEE PAIN: ICD-10-CM

## 2019-10-30 DIAGNOSIS — R26.81 UNSTEADY GAIT: ICD-10-CM

## 2019-10-30 DIAGNOSIS — M79.671 BILATERAL FOOT PAIN: ICD-10-CM

## 2019-10-30 DIAGNOSIS — M79.672 BILATERAL FOOT PAIN: ICD-10-CM

## 2019-10-30 DIAGNOSIS — M25.561 BILATERAL CHRONIC KNEE PAIN: ICD-10-CM

## 2019-10-30 DIAGNOSIS — M25.562 BILATERAL CHRONIC KNEE PAIN: ICD-10-CM

## 2019-10-30 PROCEDURE — 97110 THERAPEUTIC EXERCISES: CPT | Mod: PN

## 2019-10-30 PROCEDURE — 97116 GAIT TRAINING THERAPY: CPT | Mod: PN

## 2019-11-18 ENCOUNTER — CLINICAL SUPPORT (OUTPATIENT)
Dept: REHABILITATION | Facility: HOSPITAL | Age: 59
End: 2019-11-18
Attending: NEUROLOGICAL SURGERY
Payer: MEDICARE

## 2019-11-18 DIAGNOSIS — R26.81 UNSTEADY GAIT: Primary | ICD-10-CM

## 2019-11-18 DIAGNOSIS — M79.671 BILATERAL FOOT PAIN: ICD-10-CM

## 2019-11-18 DIAGNOSIS — R29.898 WEAKNESS OF BOTH LOWER EXTREMITIES: ICD-10-CM

## 2019-11-18 DIAGNOSIS — M79.672 BILATERAL FOOT PAIN: ICD-10-CM

## 2019-11-18 DIAGNOSIS — M25.561 BILATERAL CHRONIC KNEE PAIN: ICD-10-CM

## 2019-11-18 DIAGNOSIS — G89.29 BILATERAL CHRONIC KNEE PAIN: ICD-10-CM

## 2019-11-18 DIAGNOSIS — M25.562 BILATERAL CHRONIC KNEE PAIN: ICD-10-CM

## 2019-11-18 PROCEDURE — 97110 THERAPEUTIC EXERCISES: CPT | Mod: KX,PN

## 2019-11-18 PROCEDURE — 97116 GAIT TRAINING THERAPY: CPT | Mod: KX,PN

## 2019-11-18 NOTE — PROGRESS NOTES
Physical Therapy Daily Treatment Note      Name: Kylie Aleman  Clinic Number: [id number]     Therapy Diagnosis:           Encounter Diagnoses   Name Primary?    Bilateral chronic knee pain      Bilateral foot pain Yes    Unsteady gait      Weakness of lower extremity, unspecified laterality        Physician: Mannie Hutchison MD     Visit Date: 2019      Physician Orders: PT Eval and Treat  Medical Diagnosis: pain in both lower extremities, gait instability  Evaluation Date: 19  Last PN: 2019  Authorization Period Expiration: 10/20/2020  New Plan of Care Certification Period: 2019 - 10/9/2019  Visit #/Visits authorized: 3/36 (New Referral)- KX modifier  Total Visits: 32     Time In: 959 (pt arrives late for apt, PT unable to accommodate)  Time Out: 1040  Total Billable Time: 31 minutes (10 minutes on heat not billable)     Precautions: Standard     Subjective      Pt reports: She is doing well this morning. She walked some at home since she was last seen by PT but not as much as she should have.       She was compliant with home exercise program.  Response to previous treatment: felt good  Functional change: progressing towards goals.      Pain: 5/10  Location: B feet     Objective      BOLD = new exercise or exercise progression     Pt arrives in manual wc, slouched posture. Pt's sister pushes pt into clinic and pt pushes wc from waiting area into treatment area.      Kylie received therapeutic exercises to develop strength and endurance for 15 minutes including:      SciFit StepOne using hills on sprint setting, level 3.5 for 10 minutes to increase cardiovascular endurance    Standin:00 minutes gastroc stretch on wedge  3 laps side stepping with B UE support     Kylie participated in gait training for 16 minutes including:     TU.9 seconds (previous: 40.8 seconds)  30 seconds sit to stand: 9 with B UE use (previous score:14 with B UE use)  2MWT: 106' with RW    Ambulated  with RW approximately 108 ft total at mod I- sitting rest following trial, forward flexed at the hips, increased reliance on UE support, decreased heel strike, short reciprocal steps.        Kylie received hot pack for 10 minutes to B feet. No skin breakdown noted prior to or following application.    Pt exited session using manual wc.       Home Exercises Provided and Patient Education Provided      Education provided:   - encouraging pt to ambulate into and out of therapy sessions, as well as more at home     Written Home Exercises Provided: Patient instructed to cont prior HEP.  Exercises were reviewed and Kylie was able to demonstrate them prior to the end of the session.  Kylie demonstrated good  understanding of the education provided.      See EMR under Patient Instructions for exercises provided prior visit.     Assessment   Kylie tolerated reassessment well today. She demonstrates some improvement in TUG speed although she does not demonstrate improvement in 30 second chair rise score. PT and pt discuss the necessity for continued ambulation outside of PT sessions in order to make significant gains in PT. PT not extending POC at this time as PT wants to see pt ambulating outside of sessions more. She remains appropriate for skilled PT services to reduce gait deviations and improve endurance.      Kylie is progressing well towards her goals.   Pt prognosis is Fair.      Pt will continue to benefit from skilled outpatient physical therapy to address the deficits listed in the problem list box on initial evaluation, provide pt/family education and to maximize pt's level of independence in the home and community environment.      Pt's spiritual, cultural and educational needs considered and pt agreeable to plan of care and goals.     Anticipated barriers to physical therapy: LE weakness     Goals:      Short Term Goals:  2-3 weeks; updated 09/11/19  1.Report decreased in pain at worst less than <   /  =  4  /10  to increase tolerance for functional activities. MET previously, inconsistent  2. Pt to improve B knee range of motion by 8 degrees to allow for improved functional mobility to allow for improvement in IADLs. MET  3. Increased B knee  MMT 1/2  to increase tolerance for ADL and work activities. MET  4. Pt to report an ability to perform stand pivot transfer into her sofa. MET   5. Pt to tolerate HEP to improve ROM and independence with ADL's. MET     Long Term Goals:  6-8 weeks  1. Report decreased in pain at worse less than  <   / =  2  /10  to increase tolerance for functional mobility. ongoing  2. Pt to improve B knee range of motion by 8 degrees to allow for improved functional mobility to allow for improvement in IADLs.  MET  3. Increased B knee MMT 1 grade  to increase tolerance for ADL and work activities. MET  4. Pt will scores report at CJ level (20-40% impaired) on LEFS  to demonstrate increase in LE function with every day tasks. progressing  5. Pt to be Independent with HEP to improve ROM and independence with ADL's. MET  6. New goal 8/14/2019: Pt will decrease score on TUG to </= 30 seconds in order to make her ambulation more functional. progressing         Plan   New Plan of Care Certification Period: 10/9/2019 - 12/9/2019  Focus on standing and functional tasks. Continue to attempt to increase ambulation distance.    Jose R Baker,DPT  11/18/2019

## 2019-11-22 NOTE — PROGRESS NOTES
"Physical Therapy Daily Treatment Note      Name: Kylie Aleman  Clinic Number: [id number]     Therapy Diagnosis:           Encounter Diagnoses   Name Primary?    Bilateral chronic knee pain      Bilateral foot pain Yes    Unsteady gait      Weakness of lower extremity, unspecified laterality        Physician: Mannie Hutchison MD     Visit Date: 2019      Physician Orders: PT Eval and Treat  Medical Diagnosis: pain in both lower extremities, gait instability  Evaluation Date: 19  Last PN: 2019  Authorization Period Expiration: 10/20/2020  New Plan of Care Certification Period: 2019 - 10/9/2019  Visit #/Visits authorized:  (New Referral)- KX modifier  Total Visits: 33     Time In: 1600  Time Out: 1655  Total Billable Time: 45 minutes (10 minutes on ice not billable)     Precautions: Standard     Subjective      Pt reports: She is doing well today. Pt reports compliance with HEP. She has a lot going on in her personal life but overall doing well.        She was compliant with home exercise program.  Response to previous treatment: felt good  Functional change: progressing towards goals.      Pain: 5/10  Location: B feet     Objective      BOLD = new exercise or exercise progression     Pt arrives in manual wc, slouched posture. Pt's sister pushes pt into clinic and pt pushes wc from waiting area into treatment area.      Kylie received therapeutic exercises to develop strength and endurance for 30 minutes including:      SciFit StepOne using hills on sprint setting, level 3.5 for 10 minutes to increase cardiovascular endurance    Standin:00 minutes gastroc stretch on wedge  3 laps side stepping with B UE support   2 laps with 1 UE support  2 laps backwards ambulation with B UE support  Hip flexion 2 x 10  Step up onto 4" step, x8 ea LE lead    Kylie participated in gait training for 15 minutes including:     Ambulated with RW approximately 200 ft at mod I then 100'- sitting rest " following each trial, forward flexed at the hips, increased reliance on UE support, decreased heel strike, short reciprocal steps.      Kylie received ice pack for 10 minutes to B feet. No skin breakdown noted prior to or following application.    Pt exited session using manual wc.       Home Exercises Provided and Patient Education Provided      Education provided:   - encouraging pt to ambulate into and out of therapy sessions, as well as more at home     Written Home Exercises Provided: Patient instructed to cont prior HEP.  Exercises were reviewed and Kylie was able to demonstrate them prior to the end of the session.  Kylie demonstrated good  understanding of the education provided.      See EMR under Patient Instructions for exercises provided prior visit.     Assessment   Kylie tolerated session well today. She demonstrated improving tolerance to standing activity and ambulation. She continues to report improving pain but has not had a pain free day. Pt appears more upright during ambulation tasks today. She remains appropriate for skilled PT services to reduce gait deviations and improve endurance.      Kylie is progressing well towards her goals.   Pt prognosis is Fair.      Pt will continue to benefit from skilled outpatient physical therapy to address the deficits listed in the problem list box on initial evaluation, provide pt/family education and to maximize pt's level of independence in the home and community environment.      Pt's spiritual, cultural and educational needs considered and pt agreeable to plan of care and goals.     Anticipated barriers to physical therapy: LE weakness     Goals:      Short Term Goals:  2-3 weeks; updated 09/11/19  1.Report decreased in pain at worst less than <   / =  4  /10  to increase tolerance for functional activities. MET previously, inconsistent  2. Pt to improve B knee range of motion by 8 degrees to allow for improved functional mobility to allow for  improvement in IADLs. MET  3. Increased B knee  MMT 1/2  to increase tolerance for ADL and work activities. MET  4. Pt to report an ability to perform stand pivot transfer into her sofa. MET   5. Pt to tolerate HEP to improve ROM and independence with ADL's. MET     Long Term Goals:  6-8 weeks  1. Report decreased in pain at worse less than  <   / =  2  /10  to increase tolerance for functional mobility. ongoing  2. Pt to improve B knee range of motion by 8 degrees to allow for improved functional mobility to allow for improvement in IADLs.  MET  3. Increased B knee MMT 1 grade  to increase tolerance for ADL and work activities. MET  4. Pt will scores report at CJ level (20-40% impaired) on LEFS  to demonstrate increase in LE function with every day tasks. progressing  5. Pt to be Independent with HEP to improve ROM and independence with ADL's. MET  6. New goal 8/14/2019: Pt will decrease score on TUG to </= 30 seconds in order to make her ambulation more functional. progressing         Plan   New Plan of Care Certification Period: 10/9/2019 - 12/9/2019  Focus on standing and functional tasks. Continue to attempt to increase ambulation distance.    Jose R Baker,DPT  11/25/2019

## 2019-11-25 ENCOUNTER — CLINICAL SUPPORT (OUTPATIENT)
Dept: REHABILITATION | Facility: HOSPITAL | Age: 59
End: 2019-11-25
Attending: NEUROLOGICAL SURGERY
Payer: MEDICARE

## 2019-11-25 DIAGNOSIS — R29.898 WEAKNESS OF BOTH LOWER EXTREMITIES: ICD-10-CM

## 2019-11-25 DIAGNOSIS — M79.671 BILATERAL FOOT PAIN: ICD-10-CM

## 2019-11-25 DIAGNOSIS — G89.29 BILATERAL CHRONIC KNEE PAIN: ICD-10-CM

## 2019-11-25 DIAGNOSIS — M25.562 BILATERAL CHRONIC KNEE PAIN: ICD-10-CM

## 2019-11-25 DIAGNOSIS — M79.672 BILATERAL FOOT PAIN: ICD-10-CM

## 2019-11-25 DIAGNOSIS — R26.81 UNSTEADY GAIT: ICD-10-CM

## 2019-11-25 DIAGNOSIS — M25.561 BILATERAL CHRONIC KNEE PAIN: ICD-10-CM

## 2019-11-25 PROCEDURE — 97116 GAIT TRAINING THERAPY: CPT | Mod: KX,PN

## 2019-11-25 PROCEDURE — 97110 THERAPEUTIC EXERCISES: CPT | Mod: KX,PN

## 2019-11-26 NOTE — PROGRESS NOTES
"Physical Therapy Daily Treatment Note      Name: Kylie Aleman  Clinic Number: [id number]     Therapy Diagnosis:           Encounter Diagnoses   Name Primary?    Bilateral chronic knee pain      Bilateral foot pain Yes    Unsteady gait      Weakness of lower extremity, unspecified laterality        Physician: Mannie Hutchison MD     Visit Date: 2019      Physician Orders: PT Eval and Treat  Medical Diagnosis: pain in both lower extremities, gait instability  Evaluation Date: 19  Last PN: 2019  Authorization Period Expiration: 10/20/2020  New Plan of Care Certification Period: 2019 - 10/9/2019  Visit #/Visits authorized:  (New Referral)- KX modifier  Total Visits: 34     Time In: 817  Time Out: 910  Total Billable Time: 43 minutes (10 minutes on ice not billable)     Precautions: Standard     Subjective      Pt reports: She is doing well today. No new complaints.       She was compliant with home exercise program.  Response to previous treatment: felt good  Functional change: progressing towards goals.      Pain: 4/10  Location: low back     Objective      BOLD = new exercise or exercise progression     Pt arrives in manual wc, slouched posture. Pt's sister pushes pt into clinic and pt pushes wc from waiting area into treatment area.      Kylie received therapeutic exercises to develop strength and endurance for 30 minutes including:      SciFit StepOne using hills on sprint setting, level 3.5 for 10 minutes to increase cardiovascular endurance    Standin:00 minutes gastroc stretch on wedge  3 laps side stepping with B UE support   2 laps with 1 UE support  2 laps backwards ambulation with B UE support  Hip flexion 2 x 10  Step up onto 6" step, x8 ea LE lead    Kylie participated in gait training for 13 minutes including:     Ambulated with RW approximately 200 ft at mod I then 100'- sitting rest following each trial, forward flexed at the hips, increased reliance on UE " support, decreased heel strike, short reciprocal steps.      Kylie received ice pack for 10 minutes to low back. No skin breakdown noted prior to or following application.    Pt exited session using manual wc.       Home Exercises Provided and Patient Education Provided      Education provided:   - encouraging pt to ambulate into and out of therapy sessions, as well as more at home     Written Home Exercises Provided: Patient instructed to cont prior HEP.  Exercises were reviewed and Kylie was able to demonstrate them prior to the end of the session.  Kylie demonstrated good  understanding of the education provided.      See EMR under Patient Instructions for exercises provided prior visit.     Assessment   Kylie tolerated session well today. She tolerated increased height for step well today though relies heavily on UE support for this task. Pt continues to struggle with increasing distance for ambulation. She remains appropriate for skilled PT services to reduce gait deviations and improve endurance.      Kylie is progressing well towards her goals.   Pt prognosis is Fair.      Pt will continue to benefit from skilled outpatient physical therapy to address the deficits listed in the problem list box on initial evaluation, provide pt/family education and to maximize pt's level of independence in the home and community environment.      Pt's spiritual, cultural and educational needs considered and pt agreeable to plan of care and goals.     Anticipated barriers to physical therapy: LE weakness     Goals:      Short Term Goals:  2-3 weeks; updated 09/11/19  1.Report decreased in pain at worst less than <   / =  4  /10  to increase tolerance for functional activities. MET previously, inconsistent  2. Pt to improve B knee range of motion by 8 degrees to allow for improved functional mobility to allow for improvement in IADLs. MET  3. Increased B knee  MMT 1/2  to increase tolerance for ADL and work  activities. MET  4. Pt to report an ability to perform stand pivot transfer into her sofa. MET   5. Pt to tolerate HEP to improve ROM and independence with ADL's. MET     Long Term Goals:  6-8 weeks  1. Report decreased in pain at worse less than  <   / =  2  /10  to increase tolerance for functional mobility. ongoing  2. Pt to improve B knee range of motion by 8 degrees to allow for improved functional mobility to allow for improvement in IADLs.  MET  3. Increased B knee MMT 1 grade  to increase tolerance for ADL and work activities. MET  4. Pt will scores report at CJ level (20-40% impaired) on LEFS  to demonstrate increase in LE function with every day tasks. progressing  5. Pt to be Independent with HEP to improve ROM and independence with ADL's. MET  6. New goal 8/14/2019: Pt will decrease score on TUG to </= 30 seconds in order to make her ambulation more functional. progressing         Plan   New Plan of Care Certification Period: 10/9/2019 - 12/9/2019  Focus on standing and functional tasks. Continue to attempt to increase ambulation distance.    Jose R Baker,DPT  11/27/2019

## 2019-11-27 ENCOUNTER — CLINICAL SUPPORT (OUTPATIENT)
Dept: REHABILITATION | Facility: HOSPITAL | Age: 59
End: 2019-11-27
Attending: NEUROLOGICAL SURGERY
Payer: MEDICARE

## 2019-11-27 DIAGNOSIS — R29.898 WEAKNESS OF BOTH LOWER EXTREMITIES: ICD-10-CM

## 2019-11-27 DIAGNOSIS — M25.561 BILATERAL CHRONIC KNEE PAIN: ICD-10-CM

## 2019-11-27 DIAGNOSIS — M79.672 BILATERAL FOOT PAIN: ICD-10-CM

## 2019-11-27 DIAGNOSIS — G89.29 BILATERAL CHRONIC KNEE PAIN: ICD-10-CM

## 2019-11-27 DIAGNOSIS — R26.81 UNSTEADY GAIT: ICD-10-CM

## 2019-11-27 DIAGNOSIS — M79.671 BILATERAL FOOT PAIN: ICD-10-CM

## 2019-11-27 DIAGNOSIS — M25.562 BILATERAL CHRONIC KNEE PAIN: ICD-10-CM

## 2019-11-27 PROCEDURE — 97112 NEUROMUSCULAR REEDUCATION: CPT | Mod: KX,PN

## 2019-11-27 PROCEDURE — 97116 GAIT TRAINING THERAPY: CPT | Mod: KX,PN

## 2019-12-05 DIAGNOSIS — M79.604 PAIN IN BOTH LOWER EXTREMITIES: ICD-10-CM

## 2019-12-05 DIAGNOSIS — M79.605 PAIN IN BOTH LOWER EXTREMITIES: ICD-10-CM

## 2019-12-05 RX ORDER — PREGABALIN 75 MG/1
CAPSULE ORAL
Qty: 180 CAPSULE | Refills: 0 | Status: SHIPPED | OUTPATIENT
Start: 2019-12-05 | End: 2020-01-01 | Stop reason: SDUPTHER

## 2019-12-06 ENCOUNTER — CLINICAL SUPPORT (OUTPATIENT)
Dept: REHABILITATION | Facility: HOSPITAL | Age: 59
End: 2019-12-06
Attending: NEUROLOGICAL SURGERY
Payer: MEDICARE

## 2019-12-06 DIAGNOSIS — R29.898 WEAKNESS OF BOTH LOWER EXTREMITIES: ICD-10-CM

## 2019-12-06 DIAGNOSIS — M25.561 BILATERAL CHRONIC KNEE PAIN: ICD-10-CM

## 2019-12-06 DIAGNOSIS — M25.562 BILATERAL CHRONIC KNEE PAIN: ICD-10-CM

## 2019-12-06 DIAGNOSIS — R26.81 UNSTEADY GAIT: ICD-10-CM

## 2019-12-06 DIAGNOSIS — M79.671 BILATERAL FOOT PAIN: ICD-10-CM

## 2019-12-06 DIAGNOSIS — G89.29 BILATERAL CHRONIC KNEE PAIN: ICD-10-CM

## 2019-12-06 DIAGNOSIS — M79.672 BILATERAL FOOT PAIN: ICD-10-CM

## 2019-12-06 PROCEDURE — G8978 MOBILITY CURRENT STATUS: HCPCS | Mod: CL,PN | Performed by: PHYSICAL THERAPIST

## 2019-12-06 PROCEDURE — G8979 MOBILITY GOAL STATUS: HCPCS | Mod: CJ,PN | Performed by: PHYSICAL THERAPIST

## 2019-12-06 PROCEDURE — 97110 THERAPEUTIC EXERCISES: CPT | Mod: KX,PN | Performed by: PHYSICAL THERAPIST

## 2019-12-09 ENCOUNTER — CLINICAL SUPPORT (OUTPATIENT)
Dept: REHABILITATION | Facility: HOSPITAL | Age: 59
End: 2019-12-09
Attending: NEUROLOGICAL SURGERY
Payer: MEDICARE

## 2019-12-09 DIAGNOSIS — M79.671 BILATERAL FOOT PAIN: ICD-10-CM

## 2019-12-09 DIAGNOSIS — M79.672 BILATERAL FOOT PAIN: ICD-10-CM

## 2019-12-09 DIAGNOSIS — R26.81 UNSTEADY GAIT: ICD-10-CM

## 2019-12-09 DIAGNOSIS — G89.29 BILATERAL CHRONIC KNEE PAIN: ICD-10-CM

## 2019-12-09 DIAGNOSIS — R29.898 WEAKNESS OF BOTH LOWER EXTREMITIES: ICD-10-CM

## 2019-12-09 DIAGNOSIS — M25.562 BILATERAL CHRONIC KNEE PAIN: ICD-10-CM

## 2019-12-09 DIAGNOSIS — M25.561 BILATERAL CHRONIC KNEE PAIN: ICD-10-CM

## 2019-12-09 PROCEDURE — 97116 GAIT TRAINING THERAPY: CPT | Mod: PN

## 2019-12-09 PROCEDURE — 97110 THERAPEUTIC EXERCISES: CPT | Mod: PN

## 2019-12-09 NOTE — PROGRESS NOTES
"Physical Therapy Daily Treatment Note      Name: Kylie Aleman  Clinic Number: [id number]     Therapy Diagnosis:           Encounter Diagnoses   Name Primary?    Bilateral chronic knee pain      Bilateral foot pain Yes    Unsteady gait      Weakness of lower extremity, unspecified laterality        Physician: Mannie Hutchison MD     Visit Date: 2019      Physician Orders: PT Eval and Treat  Medical Diagnosis: pain in both lower extremities, gait instability  Evaluation Date: 19  Last PN: 2019  Authorization Period Expiration: 10/20/2020  New Plan of Care Certification Period: 2019 - 10/9/2019  Visit #/Visits authorized:  (New Referral)- KX modifier  Total Visits: 34     Time In: 0731  Time Out: 0820  Total Billable Time:49     Precautions: Standard     Subjective      Pt reports: She is doing well today, some pain in bilateral feet      She was compliant with home exercise program.  Response to previous treatment: felt good  Functional change: progressing towards goals.      Pain: 2/10  Location: low back     Objective      BOLD = new exercise or exercise progression     Pt arrives in manual wc, improved sitting posture. Pt's sister pushes pt into clinic and pt pushes wc from waiting area into treatment area.      Kylie received therapeutic exercises to develop strength and endurance for 34 minutes including:      SciFit StepOne using hills on sprint setting, level 4 for 10 minutes to increase cardiovascular endurance. Rest break required after performed     Standin:00 minutes gastroc stretch on wedge  4 laps side stepping with B UE support   3 laps with 1 UE support  3 laps backwards ambulation with B UE support  Hip flexion 30 ea BLE  Step up onto 6" step, 2x10 ea LE lead    Kylie participated in gait training for 15 minutes including:     Ambulated with RW approximately 200 ft x2 at mod I - sitting rest following each trial. Pt with flexed posture and increased weight " distribution through mcTEL.     Kylie received ice pack for 0 minutes to low back. No skin breakdown noted prior to or following application.    Pt exited session using manual wc.       Home Exercises Provided and Patient Education Provided      Education provided:   - encouraging pt to ambulate into and out of therapy sessions, as well as more at home     Written Home Exercises Provided: Patient instructed to cont prior HEP.  Exercises were reviewed and Kylie was able to demonstrate them prior to the end of the session.  Kylie demonstrated good  understanding of the education provided.      See EMR under Patient Instructions for exercises provided prior visit.     Assessment   Kylie tolerated session well today. She tolerated increase in gait distance and an increase with amount of exercises performed. Pt required less time during seated rest breaks. Cues required for posture during standing activities. Kylie is progressing well towards her goals.   Pt prognosis is Fair.      Pt will continue to benefit from skilled outpatient physical therapy to address the deficits listed in the problem list box on initial evaluation, provide pt/family education and to maximize pt's level of independence in the home and community environment.      Pt's spiritual, cultural and educational needs considered and pt agreeable to plan of care and goals.     Anticipated barriers to physical therapy: LE weakness     Goals:      Short Term Goals:  2-3 weeks; updated 09/11/19  1.Report decreased in pain at worst less than <   / =  4  /10  to increase tolerance for functional activities. MET previously, inconsistent  2. Pt to improve B knee range of motion by 8 degrees to allow for improved functional mobility to allow for improvement in IADLs. MET  3. Increased B knee  MMT 1/2  to increase tolerance for ADL and work activities. MET  4. Pt to report an ability to perform stand pivot transfer into her sofa. MET   5. Pt to tolerate  HEP to improve ROM and independence with ADL's. MET     Long Term Goals:  6-8 weeks  1. Report decreased in pain at worse less than  <   / =  2  /10  to increase tolerance for functional mobility. ongoing  2. Pt to improve B knee range of motion by 8 degrees to allow for improved functional mobility to allow for improvement in IADLs.  MET  3. Increased B knee MMT 1 grade  to increase tolerance for ADL and work activities. MET  4. Pt will scores report at CJ level (20-40% impaired) on LEFS  to demonstrate increase in LE function with every day tasks. progressing  5. Pt to be Independent with HEP to improve ROM and independence with ADL's. MET  6. New goal 8/14/2019: Pt will decrease score on TUG to </= 30 seconds in order to make her ambulation more functional. progressing         Plan   New Plan of Care Certification Period: 10/9/2019 - 12/9/2019  Focus on standing and functional tasks. Continue to attempt to increase ambulation distance.    Vaibhav Tejada DPT  12/9/2019

## 2019-12-09 NOTE — PLAN OF CARE
"Physical Therapy Daily Treatment Note      Name: Kylie Aleman  Clinic Number: [id number]     Therapy Diagnosis:           Encounter Diagnoses   Name Primary?    Bilateral chronic knee pain      Bilateral foot pain Yes    Unsteady gait      Weakness of lower extremity, unspecified laterality        Physician: Mannie Hutchison MD     Visit Date: 2019      Physician Orders: PT Eval and Treat  Medical Diagnosis: pain in both lower extremities, gait instability    Evaluation Date: 19  Last PN: 2019  Authorization Period Expiration: 10/20/2020  Plan of Care Certification Period: 10/9/2019 - 2019   New POC: 12/10/2019 to 2019    Visit #/Visits authorized:  (New Referral)- KX modifier  Total Visits: 35     Time In: 0900  Time Out: 09  Total Billable Time: 45 minutes      Precautions: Standard     Functional Limitations Reports - G Codes  Category: mobility   Tool: FOTO  Score: 70% impairment    G codes 4/10    eval CK-CJ   19 CL-CJ   19 CK-CJ   10/30/19 CK-CJ                 Subjective      Pt reports: She is doing well today. No new complaints.  "I need to schedule for next week". Practices walking 3x/day  She was compliant with home exercise program.  Response to previous treatment: felt good  Functional change: progressing towards goals.      Pain: 2/10  Location: low back, ECTOR feet     Objective      BOLD = new exercise or exercise progression     Pt arrives in manual wc, slouched posture. PT pushes wc from waiting area into treatment area.      Kylie received therapeutic exercises to develop strength and endurance for 45 minutes including:      TU.3 seconds (previous: 34.9 seconds)  30 seconds sit to stand: 9 with B UE use (previous score:9 with B UE use)  2MWT: 160 ft (was 106' with RW)     Ambulated with RW approximately 160 ft x 2 at mod I- sitting rest following trial, forward flexed at the hips, increased reliance on UE support, decreased heel strike,     SciFit " "StepOne manual, level 4.0 for 10 minutes to increase cardiovascular endurance    Standin:00 minutes gastroc stretch on wedge  3 laps side stepping with B UE support   2 laps with 1 UE support  2 laps backwards ambulation with B UE support  Hip flexion 20 x 2  Step up onto 6" step, x8 ea LE lead    Pt exited session using manual wc.       Home Exercises Provided and Patient Education Provided      Education provided:   - encouraging pt to ambulate into and out of therapy sessions, as well as more at home     Written Home Exercises Provided: Patient instructed to cont prior HEP.  Exercises were reviewed and Kylie was able to demonstrate them prior to the end of the session.  Kylie demonstrated good  understanding of the education provided.      See EMR under Patient Instructions for exercises provided prior visit.     Assessment   Assessment period: 2019 to 2019. Pt attended 3 PT sessions since last assessment was performed.    Kylie tolerates PT sessions well and is demonstrating good progress towards goals. Pt reports that she ambulates approximate 3x/day at home with RW. Pt continues to rely on w/c for mobility to attend PT sessions and for the majority of household mobility. Today, pt was able to increase ambulatory distance tolerated with decreased rest breaks required. Pt continues to require >1 sitting rest break during 45 minute session to tolerate standing activities. Pt demonstrates increased speed of mobility by improved score on TUG with RW and for 2 minute walk test. Pt continues to demonstrate a risk for falls based on these scores. Pt can benefit from continued skilled PT to progress ambulation to the primary means of mobility to improve pt's ability to ambulate inside of her home and improve pt's ability to perform house hold duties. Progress is limited by consistent attendance in PT sessions (due to transportation/ scheduling).       Kylie is progressing well towards her " goals.   Pt prognosis is Fair.      Pt will continue to benefit from skilled outpatient physical therapy to address the deficits listed in the problem list box on initial evaluation, provide pt/family education and to maximize pt's level of independence in the home and community environment.      Pt's spiritual, cultural and educational needs considered and pt agreeable to plan of care and goals.     Anticipated barriers to physical therapy: LE weakness     Goals:      Short Term Goals:  2-3 weeks; updated 12/6/2019  1.Report decreased in pain at worst less than <   / =  4  /10  to increase tolerance for functional activities. MET previously, inconsistent  2. Pt to improve B knee range of motion by 8 degrees to allow for improved functional mobility to allow for improvement in IADLs. MET  3. Increased B knee  MMT 1/2  to increase tolerance for ADL and work activities. MET  4. Pt to report an ability to perform stand pivot transfer into her sofa. MET   5. Pt to tolerate HEP to improve ROM and independence with ADL's. MET     Long Term Goals:  6-8 weeks  1. Report decreased in pain at worse less than  <   / =  2  /10  to increase tolerance for functional mobility. Met 12/6/2019  2. Pt to improve B knee range of motion by 8 degrees to allow for improved functional mobility to allow for improvement in IADLs.  MET  3. Increased B knee MMT 1 grade  to increase tolerance for ADL and work activities. MET  4. Pt will scores report at CJ level (20-40% impaired) on LEFS  to demonstrate increase in LE function with every day tasks. progressing  5. Pt to be Independent with HEP to improve ROM and independence with ADL's. MET  6. New goal 8/14/2019: Pt will decrease score on TUG to </= 30 seconds in order to make her ambulation more functional. improved        Plan   New POC: 12/10/2019 to 1/31/2019    Focus on standing and functional tasks. Continue increase ambulation distance as tolerated.    Yelena VALERIO  XANDER Ferrer  12/6/2019

## 2019-12-10 ENCOUNTER — DOCUMENTATION ONLY (OUTPATIENT)
Dept: REHABILITATION | Facility: HOSPITAL | Age: 59
End: 2019-12-10

## 2019-12-10 NOTE — PROGRESS NOTES
I, Yelena Ferrer, DPT, met with Vaibhav Cruz PTA to discuss this pt's POC. PT is addressing general strengthening to improve mobility. Progress gait distance as able. Begin to perform more standing activities as tolerated.     Meeting performed on 12/9/2019.   Yelena Ferrer, PT, DPT  Vaibhav Tejada PTA  12/10/2019

## 2019-12-11 ENCOUNTER — CLINICAL SUPPORT (OUTPATIENT)
Dept: REHABILITATION | Facility: HOSPITAL | Age: 59
End: 2019-12-11
Attending: NEUROLOGICAL SURGERY
Payer: MEDICARE

## 2019-12-11 DIAGNOSIS — M25.562 BILATERAL CHRONIC KNEE PAIN: ICD-10-CM

## 2019-12-11 DIAGNOSIS — G89.29 BILATERAL CHRONIC KNEE PAIN: ICD-10-CM

## 2019-12-11 DIAGNOSIS — M25.561 BILATERAL CHRONIC KNEE PAIN: ICD-10-CM

## 2019-12-11 DIAGNOSIS — R29.898 WEAKNESS OF BOTH LOWER EXTREMITIES: ICD-10-CM

## 2019-12-11 DIAGNOSIS — M79.671 BILATERAL FOOT PAIN: ICD-10-CM

## 2019-12-11 DIAGNOSIS — M79.672 BILATERAL FOOT PAIN: ICD-10-CM

## 2019-12-11 DIAGNOSIS — R26.81 UNSTEADY GAIT: ICD-10-CM

## 2019-12-11 PROCEDURE — 97110 THERAPEUTIC EXERCISES: CPT | Mod: KX,PN

## 2019-12-11 PROCEDURE — 97116 GAIT TRAINING THERAPY: CPT | Mod: KX,PN

## 2019-12-11 NOTE — PROGRESS NOTES
"Physical Therapy Daily Treatment Note      Name: Kylie Aleman  Clinic Number: [id number]     Therapy Diagnosis:           Encounter Diagnoses   Name Primary?    Bilateral chronic knee pain      Bilateral foot pain Yes    Unsteady gait      Weakness of lower extremity, unspecified laterality        Physician: Mannie Hutchison MD     Visit Date: 2019      Physician Orders: PT Eval and Treat  Medical Diagnosis: pain in both lower extremities, gait instability  Evaluation Date: 19  Last PN: 2019  Authorization Period Expiration: 10/20/2020  New Plan of Care Certification Period: 2019 - 10/9/2019  Visit #/Visits authorized:  (New Referral)- KX modifier  Total Visits: 34     Time In: 0851  Time Out: 0938  Total Billable Time:47     Precautions: Standard     Subjective      Pt reports: She is doing well today, some pain in bilateral feet      She was compliant with home exercise program.  Response to previous treatment: felt good  Functional change: progressing towards goals.      Pain: 2/10  Location: low back and feet     Objective      BOLD = new exercise or exercise progression     Pt arrives in manual wc, improved sitting posture. Pt's sister pushes pt into clinic and pt pushes wc from waiting area into treatment area.      Kylie received therapeutic exercises to develop strength and endurance for 34 minutes including:      SciFit StepOne using hills on sprint setting, level 4 for  minutes to increase cardiovascular endurance. Rest break required after performed     Standin:00 minutes gastroc stretch on wedge  6 laps side stepping with B UE support   3 laps backwards ambulation with B UE support  4 laps with 1 UE support>>>Hip flexion 2x20 ea BLE with no rest break  Step up onto 6" step, 2x10 ea LE lead    Kylie participated in gait training for 12 minutes including:     Ambulated with RW approximately 220 ft x2 at mod I - sitting rest following each trial. Pt with flexed " posture and increased weight distribution through BUEs.     Kylie received ice pack for 0 minutes to low back. No skin breakdown noted prior to or following application.    Pt exited session using manual wc.       Home Exercises Provided and Patient Education Provided      Education provided:   - encouraging pt to ambulate into and out of therapy sessions, as well as more at home     Written Home Exercises Provided: Patient instructed to cont prior HEP.  Exercises were reviewed and Kylie was able to demonstrate them prior to the end of the session.  Kylie demonstrated good  understanding of the education provided.      See EMR under Patient Instructions for exercises provided prior visit.     Assessment   Kylie tolerated session very well today. Pt with increased duration for all exercises with significantly less rest breaks. Pt still shows up in WC, even though therapist suggest to come to therapy without it. Pt still ambulates with decreased foot clearance and flexed posture. Pt cued during gait to put less weight through BUEs   Pt prognosis is Fair.      Pt will continue to benefit from skilled outpatient physical therapy to address the deficits listed in the problem list box on initial evaluation, provide pt/family education and to maximize pt's level of independence in the home and community environment.      Pt's spiritual, cultural and educational needs considered and pt agreeable to plan of care and goals.     Anticipated barriers to physical therapy: LE weakness     Goals:      Short Term Goals:  2-3 weeks; updated 09/11/19  1.Report decreased in pain at worst less than <   / =  4  /10  to increase tolerance for functional activities. MET previously, inconsistent  2. Pt to improve B knee range of motion by 8 degrees to allow for improved functional mobility to allow for improvement in IADLs. MET  3. Increased B knee  MMT 1/2  to increase tolerance for ADL and work activities. MET  4. Pt to report  an ability to perform stand pivot transfer into her sofa. MET   5. Pt to tolerate HEP to improve ROM and independence with ADL's. MET     Long Term Goals:  6-8 weeks  1. Report decreased in pain at worse less than  <   / =  2  /10  to increase tolerance for functional mobility. ongoing  2. Pt to improve B knee range of motion by 8 degrees to allow for improved functional mobility to allow for improvement in IADLs.  MET  3. Increased B knee MMT 1 grade  to increase tolerance for ADL and work activities. MET  4. Pt will scores report at CJ level (20-40% impaired) on LEFS  to demonstrate increase in LE function with every day tasks. progressing  5. Pt to be Independent with HEP to improve ROM and independence with ADL's. MET  6. New goal 8/14/2019: Pt will decrease score on TUG to </= 30 seconds in order to make her ambulation more functional. progressing         Plan   New Plan of Care Certification Period: 10/9/2019 - 12/9/2019  Focus on standing and functional tasks. Continue to attempt to increase ambulation distance.    Vaibhav Tejada,PTA  12/11/2019

## 2019-12-16 ENCOUNTER — CLINICAL SUPPORT (OUTPATIENT)
Dept: REHABILITATION | Facility: HOSPITAL | Age: 59
End: 2019-12-16
Attending: NEUROLOGICAL SURGERY
Payer: MEDICARE

## 2019-12-16 DIAGNOSIS — M79.671 BILATERAL FOOT PAIN: ICD-10-CM

## 2019-12-16 DIAGNOSIS — M25.562 BILATERAL CHRONIC KNEE PAIN: ICD-10-CM

## 2019-12-16 DIAGNOSIS — G89.29 BILATERAL CHRONIC KNEE PAIN: ICD-10-CM

## 2019-12-16 DIAGNOSIS — R29.898 WEAKNESS OF BOTH LOWER EXTREMITIES: ICD-10-CM

## 2019-12-16 DIAGNOSIS — M25.561 BILATERAL CHRONIC KNEE PAIN: ICD-10-CM

## 2019-12-16 DIAGNOSIS — M79.672 BILATERAL FOOT PAIN: ICD-10-CM

## 2019-12-16 DIAGNOSIS — R26.81 UNSTEADY GAIT: ICD-10-CM

## 2019-12-16 PROCEDURE — 97110 THERAPEUTIC EXERCISES: CPT | Mod: KX,PN

## 2019-12-16 PROCEDURE — 97116 GAIT TRAINING THERAPY: CPT | Mod: KX,PN

## 2019-12-16 NOTE — PROGRESS NOTES
"Physical Therapy Daily Treatment Note      Name: Kylie Aleman  Clinic Number: [id number]     Therapy Diagnosis:           Encounter Diagnoses   Name Primary?    Bilateral chronic knee pain      Bilateral foot pain Yes    Unsteady gait      Weakness of lower extremity, unspecified laterality        Physician: Mannie Hutchison MD     Visit Date: 2019      Physician Orders: PT Eval and Treat  Medical Diagnosis: pain in both lower extremities, gait instability  Evaluation Date: 19  Last PN: 2019  Authorization Period Expiration: 10/20/2020  New Plan of Care Certification Period: 2019 - 10/9/2019  Visit #/Visits authorized:  (New Referral)- KX modifier  Total Visits: 34     Time In: 0816  Time Out: 902  Total Billable Time: 46 minutes      Precautions: Standard     Subjective      Pt reports: Feeling better with just a little pain in the feet.     She was compliant with home exercise program.  Response to previous treatment: felt good  Functional change: progressing towards goals.      Pain: 2/10  Location: low back and feet     Objective      BOLD = new exercise or exercise progression     Pt arrives in manual wc, improved sitting posture. Pt's sister pushes pt into clinic and pt pushes wc from waiting area into treatment area.      Kylie received therapeutic exercises to develop strength and endurance for 33 minutes including:      SciFit StepOne using hills on sprint setting 4.3, level 8 for  minutes to increase cardiovascular endurance. Rest break required after performed     Standin:00 minutes gastroc stretch on wedge  Step up onto 6" step, 2x10 ea LE lead  Hip flexion 2x20  BLE onto 6'' box with  >>>3 laps backwards ambulation with B UE support  6 laps side stepping with B UE support   4 laps with 1 UE support      Kylie participated in gait training for 12 minutes including:     Ambulated with RW approximately 275ft and 220ft x2  at mod I - sitting rest following each " trial. Pt with flexed posture and increased weight distribution through BUEs.      Kylie received ice pack for 0 minutes to low back. No skin breakdown noted prior to or following application.    Pt exited session using manual wc.       Home Exercises Provided and Patient Education Provided      Education provided:   - encouraging pt to ambulate into and out of therapy sessions, as well as more at home     Written Home Exercises Provided: Patient instructed to cont prior HEP.  Exercises were reviewed and Kylie was able to demonstrate them prior to the end of the session.  [place] demonstrated good  understanding of the education provided.      See EMR under Patient Instructions for exercises provided prior visit.     Assessment   Kylie tolerated session very well today. Pt with increased endurance with performing gait at end of sessions after all performed exercises. Pt still shows up in WC, even though therapist suggest to come to therapy without it. Pt with noted increase in foot clearance and improved posture during gait. Pt cued during gait to put less weight through BUEs   Pt prognosis is Fair.      Pt will continue to benefit from skilled outpatient physical therapy to address the deficits listed in the problem list box on initial evaluation, provide pt/family education and to maximize pt's level of independence in the home and community environment.      Pt's spiritual, cultural and educational needs considered and pt agreeable to plan of care and goals.     Anticipated barriers to physical therapy: LE weakness     Goals:      Short Term Goals:  2-3 weeks; updated 09/11/19  1.Report decreased in pain at worst less than <   / =  4  /10  to increase tolerance for functional activities. MET previously, inconsistent  2. Pt to improve B knee range of motion by 8 degrees to allow for improved functional mobility to allow for improvement in IADLs. MET  3. Increased B knee  MMT 1/2  to increase tolerance for  ADL and work activities. MET  4. Pt to report an ability to perform stand pivot transfer into her sofa. MET   5. Pt to tolerate HEP to improve ROM and independence with ADL's. MET     Long Term Goals:  6-8 weeks  1. Report decreased in pain at worse less than  <   / =  2  /10  to increase tolerance for functional mobility. ongoing  2. Pt to improve B knee range of motion by 8 degrees to allow for improved functional mobility to allow for improvement in IADLs.  MET  3. Increased B knee MMT 1 grade  to increase tolerance for ADL and work activities. MET  4. Pt will scores report at CJ level (20-40% impaired) on LEFS  to demonstrate increase in LE function with every day tasks. progressing  5. Pt to be Independent with HEP to improve ROM and independence with ADL's. MET  6. New goal 8/14/2019: Pt will decrease score on TUG to </= 30 seconds in order to make her ambulation more functional. progressing         Plan   New Plan of Care Certification Period: 10/9/2019 - 12/9/2019  Focus on standing and functional tasks. Continue to attempt to increase ambulation distance.    Vaibhav Tejada,PTA  12/16/2019

## 2019-12-18 ENCOUNTER — CLINICAL SUPPORT (OUTPATIENT)
Dept: REHABILITATION | Facility: HOSPITAL | Age: 59
End: 2019-12-18
Attending: NEUROLOGICAL SURGERY
Payer: MEDICARE

## 2019-12-18 DIAGNOSIS — M25.561 BILATERAL CHRONIC KNEE PAIN: ICD-10-CM

## 2019-12-18 DIAGNOSIS — G89.29 BILATERAL CHRONIC KNEE PAIN: ICD-10-CM

## 2019-12-18 DIAGNOSIS — M79.672 BILATERAL FOOT PAIN: ICD-10-CM

## 2019-12-18 DIAGNOSIS — M25.562 BILATERAL CHRONIC KNEE PAIN: ICD-10-CM

## 2019-12-18 DIAGNOSIS — R26.81 UNSTEADY GAIT: ICD-10-CM

## 2019-12-18 DIAGNOSIS — M79.671 BILATERAL FOOT PAIN: ICD-10-CM

## 2019-12-18 DIAGNOSIS — R29.898 WEAKNESS OF BOTH LOWER EXTREMITIES: ICD-10-CM

## 2019-12-18 PROCEDURE — 97116 GAIT TRAINING THERAPY: CPT | Mod: KX,PN | Performed by: PHYSICAL THERAPIST

## 2019-12-18 PROCEDURE — 97110 THERAPEUTIC EXERCISES: CPT | Mod: KX,PN | Performed by: PHYSICAL THERAPIST

## 2019-12-18 NOTE — PLAN OF CARE
"Physical Therapy Daily Treatment Note      Name: Kylie Aleman  Clinic Number: [id number]     Therapy Diagnosis:           Encounter Diagnoses   Name Primary?    Bilateral chronic knee pain      Bilateral foot pain Yes    Unsteady gait      Weakness of lower extremity, unspecified laterality        Physician: Mannie Hutchison MD     Visit Date: 12/18/2019      Physician Orders: PT Eval and Treat  Medical Diagnosis: pain in both lower extremities, gait instability  Evaluation Date: 5/9/19  Last PN: 11/18/2019  Authorization Period Expiration: 10/20/2020  Plan of Care Certification Period: 7/17/2019 - 10/9/2019  New POC: 12/10/2019 to 1/31/2019    Visit #/Visits authorized: 11/36 (New Referral)- KX modifier  Total Visits: 38     Time In: 0949  Time Out: 1035  Total Billable Time: 23 minutes   Total treatment time: 46 min     Precautions: Standard     Subjective      Pt reports: Feeling good today  She was compliant with home exercise program.  Response to previous treatment: felt good  Functional change: progressing towards goals.      Pain: 2/10  Location: low back and feet     Objective      BOLD = new exercise or exercise progression     Pt arrives in manual wc, improved sitting posture. Pt's sister pushes pt into clinic and pt pushes wc from waiting area into treatment area.      Kylie received therapeutic exercises to develop strength and endurance for 31 minutes including:      SciFit StepOne using hills on sprint setting 4.5, for 8 minutes to increase cardiovascular endurance. Rest break required after performed     30 sec sit<>stand test= 14x with UE on armrests (was 9x with BUE support)    Standing:   Step up onto 6" step, 2x10 ea LE lead   3 laps backwards ambulation with B UE support  6 laps side stepping with B UE support   4 laps with 1 UE support      Kylie participated in gait training for 15 minutes including:   TUG with RW from w/c= 28.5 sec (was 32.3 sec)     Ambulated with RW approximately " 220ft x2 with RW, mod I - sitting rest following each trial. Pt with flexed posture and increased weight distribution through BUEs.     Parallel bars: static standing x 3 trials, attempted 30 sec intervals, intermittent UE support, SBA    Pt exited session using manual wc.       Home Exercises Provided and Patient Education Provided      Education provided:   - encouraging pt to ambulate into and out of therapy sessions, as well as more at home     Written Home Exercises Provided: Patient instructed to cont prior HEP.  Exercises were reviewed and Kylie was able to demonstrate them prior to the end of the session.  Kylie demonstrated good  understanding of the education provided.      See EMR under Patient Instructions for exercises provided prior visit.     Assessment   Assessment period: 12/9/2019 to 12/18/2019   Kylie tolerates sessions well. She demonstrates a good improvement in increasing ambulatory distance, but still attends PT sessions in w/c. PT continues to encourage pt to attempt to ambulate in from the parking lot to improve gait practice. Pt continues to stand with increased flexion of hips and increased weight bearing on ars due to decreased hip and core strength. PT addressed pt's ability to statically stand without UE support to improve core strength, posture, and standing balance. pt was not able to maintain balance for >10 sec without UE use. Good improvement noted in speed of mobility/ gait. Pt met goal for improved speed of mobility via UTG, and goal was updated to reflect progression to independence with household mobility. PT also set new goals for strength via 30 sec sit<>stand test and balance with decreased UE to progress pt's ability to perform some household chores. Pt can benefit from continued skilled PT to address strength to improve ambulation ability and decrease need for w/c.      Pt prognosis is Fair.      Pt will continue to benefit from skilled outpatient physical therapy to  address the deficits listed in the problem list box on initial evaluation, provide pt/family education and to maximize pt's level of independence in the home and community environment.      Pt's spiritual, cultural and educational needs considered and pt agreeable to plan of care and goals.     Anticipated barriers to physical therapy: LE weakness     Goals:   Short Term Goals:  2-3 weeks; updated 12/18/19  1.Report decreased in pain at worst less than <   / =  4  /10  to increase tolerance for functional activities. MET previously, inconsistent  2. Pt to improve B knee range of motion by 8 degrees to allow for improved functional mobility to allow for improvement in IADLs. MET  3. Increased B knee  MMT 1/2  to increase tolerance for ADL and work activities. MET  4. Pt to report an ability to perform stand pivot transfer into her sofa. MET   5. Pt to tolerate HEP to improve ROM and independence with ADL's. MET     Long Term Goals:  6-8 weeks  1. Report decreased in pain at worse less than  <   / =  2  /10  to increase tolerance for functional mobility. ongoing  2. Pt to improve B knee range of motion by 8 degrees to allow for improved functional mobility to allow for improvement in IADLs.  MET  3. Increased B knee MMT 1 grade  to increase tolerance for ADL and work activities. MET  4. Pt will scores report at CJ level (20-40% impaired) on LEFS  to demonstrate increase in LE function with every day tasks. progressing  5. Pt to be Independent with HEP to improve ROM and independence with ADL's. MET  6. New goal 8/14/2019: Pt will decrease score on TUG to </= 30 seconds in order to make her ambulation more functional. met 12/18/19   Revised 12/18/19:  pt will perform TUG in <20 sec to demonstrate decreased fall risk and improvement in mobility for household distance to decrease w/c use.   7. New 12/19/19: pt will demonstrate improved general strength for being able to stand from various surfaces by performing 9  sit<>stands from chair without armrests.- 14x with arms supported on armrests  8. New 12/19/19: pt will perform static standing with intermittent UE support for 5 min to improve ability to perform some house hold duties (I.e. wiping a counter)- ~10 sec without UE support, 30 sec max with intermittent support        Plan     Focus on standing with decreased UE support. Continue to attempt to increase ambulation distance.    Yelena Ferrer,PT  12/18/2019

## 2019-12-20 ENCOUNTER — CLINICAL SUPPORT (OUTPATIENT)
Dept: REHABILITATION | Facility: HOSPITAL | Age: 59
End: 2019-12-20
Attending: NEUROLOGICAL SURGERY
Payer: MEDICARE

## 2019-12-20 DIAGNOSIS — G89.29 BILATERAL CHRONIC KNEE PAIN: ICD-10-CM

## 2019-12-20 DIAGNOSIS — M79.672 BILATERAL FOOT PAIN: ICD-10-CM

## 2019-12-20 DIAGNOSIS — M79.671 BILATERAL FOOT PAIN: ICD-10-CM

## 2019-12-20 DIAGNOSIS — M25.561 BILATERAL CHRONIC KNEE PAIN: ICD-10-CM

## 2019-12-20 DIAGNOSIS — R26.81 UNSTEADY GAIT: ICD-10-CM

## 2019-12-20 DIAGNOSIS — R29.898 WEAKNESS OF LOWER EXTREMITY, UNSPECIFIED LATERALITY: ICD-10-CM

## 2019-12-20 DIAGNOSIS — M25.562 BILATERAL CHRONIC KNEE PAIN: ICD-10-CM

## 2019-12-20 PROCEDURE — 97110 THERAPEUTIC EXERCISES: CPT | Mod: KX,PN | Performed by: PHYSICAL THERAPIST

## 2019-12-20 PROCEDURE — G8978 MOBILITY CURRENT STATUS: HCPCS | Mod: CL,PN | Performed by: PHYSICAL THERAPIST

## 2019-12-20 PROCEDURE — 97116 GAIT TRAINING THERAPY: CPT | Mod: KX,PN | Performed by: PHYSICAL THERAPIST

## 2019-12-20 PROCEDURE — G8979 MOBILITY GOAL STATUS: HCPCS | Mod: CJ,PN | Performed by: PHYSICAL THERAPIST

## 2019-12-20 NOTE — PROGRESS NOTES
Physical Therapy Daily Treatment Note      Name: Kylie Aleman  Clinic Number: [id number]     Therapy Diagnosis:           Encounter Diagnoses   Name Primary?    Bilateral chronic knee pain      Bilateral foot pain Yes    Unsteady gait      Weakness of lower extremity, unspecified laterality        Physician: Mannie Hutchison MD     Visit Date: 12/20/2019      Physician Orders: PT Eval and Treat  Medical Diagnosis: pain in both lower extremities, gait instability  Evaluation Date: 5/9/19  Authorization Period Expiration: 10/20/2020  New POC: 12/10/2019 to 1/31/2019     Visit #/Visits authorized: 12/36 (New Referral)- KX modifier  Total Visits: 39     Time In: 0900  Time Out: 0945  Total Billable Time: 45 minutes      Precautions: Standard     Functional Limitations Reports - G Codes  Category: mobility   Tool: FOTO  Score: 70% impairment     G codes 9/10    eval CK-CJ   8/9/19 CL-CJ   9/19/19 CK-CJ   10/30/19 CK-CJ    12/20/19 CL-CJ             Subjective      Pt reports: Feeling good today  She was compliant with home exercise program.  Response to previous treatment: felt good  Functional change: progressing towards goals.      Pain: 3/10  Location: low back and feet         CMS Impairment/Limitation/Restriction for FOTO NOC musculoskeletal disorder Survey    Therapist reviewed FOTO scores for Kylie Aleman on 12/20/2019.   FOTO documents entered into [employer] - see Media section.    Limitation Score: 72%  Category: Mobility    Current : CL = least 60% but < 80% impaired, limited or restricted  Goal: CJ = at least 20% but < 40% impaired, limited or restricted         Objective      BOLD = new exercise or exercise progression     Pt arrives in manual wc, improved sitting posture. Pt's sister pushes pt into clinic and pt pushes wc from waiting area into treatment area.      Kylie received therapeutic exercises to develop strength and endurance for 27 minutes including:      SciFit StepOne using hills on  "sprint setting 4.5, for 10 minutes to increase cardiovascular endurance. Rest break required after performed       sitting (freemotion): lat pull down 10# ea UE 2 x 10   Wood chop high to low 10# 10x    Standing:   Step up onto 6" step, 2x10 ea LE lead   3 laps backwards ambulation with B UE support  3 laps side stepping with 1-2 UE support   3 laps with 1 UE support        Kylie participated in gait training for 18 minutes including:      Ambulated with RW approximately 220ft + 110ft + 110 ft with RW, mod I - sitting rest following each trial. Pt with flexed posture and increased weight distribution through BUEs.      Parallel bars: static standing x 3 trials, attempted 30 sec intervals, intermittent UE support, SBA (only able to tolerate 10 sec)     Pt exited session using manual wc.       Home Exercises Provided and Patient Education Provided      Education provided:   - encouraging pt to ambulate into and out of therapy sessions, as well as more at home     Written Home Exercises Provided: Patient instructed to cont prior HEP.  Exercises were reviewed and Kylie was able to demonstrate them prior to the end of the session.  Kylie demonstrated good  understanding of the education provided.      See EMR under Patient Instructions for exercises provided prior visit.     Assessment   Kylie tolerated treatment well. PT advanced pt's level and duration on recumbent stepper. As a result, pt was not able to ambulate as far without resting. Pt was able to demonstrate tolerance of same ambulation distance with at least 2 sitting rest breaks (last visit pt required 1).  pt also required more sitting rest breaks during standing activities today. PT initiated UE/ core strengthening at [employer] machine to improve standing tolerance, decrease back pain, and improve upright standing posture. Pt can benefit from continued skilled PT to address strength and balance without UE support to improve gait ability and decrease " need for w/c.    Pt prognosis is Fair.      Pt will continue to benefit from skilled outpatient physical therapy to address the deficits listed in the problem list box on initial evaluation, provide pt/family education and to maximize pt's level of independence in the home and community environment.      Pt's spiritual, cultural and educational needs considered and pt agreeable to plan of care and goals.     Anticipated barriers to physical therapy: LE weakness     Goals:   Short Term Goals:  2-3 weeks; updated 12/18/19  1.Report decreased in pain at worst less than <   / =  4  /10  to increase tolerance for functional activities. MET previously, inconsistent  2. Pt to improve B knee range of motion by 8 degrees to allow for improved functional mobility to allow for improvement in IADLs. MET  3. Increased B knee  MMT 1/2  to increase tolerance for ADL and work activities. MET  4. Pt to report an ability to perform stand pivot transfer into her sofa. MET   5. Pt to tolerate HEP to improve ROM and independence with ADL's. MET     Long Term Goals:  6-8 weeks  1. Report decreased in pain at worse less than  <   / =  2  /10  to increase tolerance for functional mobility. ongoing  2. Pt to improve B knee range of motion by 8 degrees to allow for improved functional mobility to allow for improvement in IADLs.  MET  3. Increased B knee MMT 1 grade  to increase tolerance for ADL and work activities. MET  4. Pt will scores report at CJ level (20-40% impaired) on LEFS  to demonstrate increase in LE function with every day tasks. progressing  5. Pt to be Independent with HEP to improve ROM and independence with ADL's. MET  6. New goal 8/14/2019: Pt will decrease score on TUG to </= 30 seconds in order to make her ambulation more functional. met 12/18/19              Revised 12/18/19:  pt will perform TUG in <20 sec to demonstrate decreased fall risk and improvement in mobility for household distance to decrease w/c use.   7.  New 12/19/19: pt will demonstrate improved general strength for being able to stand from various surfaces by performing 9 sit<>stands from chair without armrests.- 14x with arms supported on armrests  8. New 12/19/19: pt will perform static standing with intermittent UE support for 5 min to improve ability to perform some house hold duties (I.e. wiping a counter)- ~10 sec without UE support, 30 sec max with intermittent support        Plan     Focus on standing with decreased UE support. Continue to attempt to increase ambulation distance. Continue with core strengthening via Freemotion machine.      Yelena Ferrer,PT  12/20/2019

## 2019-12-23 ENCOUNTER — CLINICAL SUPPORT (OUTPATIENT)
Dept: REHABILITATION | Facility: HOSPITAL | Age: 59
End: 2019-12-23
Attending: NEUROLOGICAL SURGERY
Payer: MEDICARE

## 2019-12-23 DIAGNOSIS — R29.898 WEAKNESS OF LOWER EXTREMITY, UNSPECIFIED LATERALITY: ICD-10-CM

## 2019-12-23 DIAGNOSIS — M25.562 BILATERAL CHRONIC KNEE PAIN: ICD-10-CM

## 2019-12-23 DIAGNOSIS — R26.81 UNSTEADY GAIT: ICD-10-CM

## 2019-12-23 DIAGNOSIS — M25.561 BILATERAL CHRONIC KNEE PAIN: ICD-10-CM

## 2019-12-23 DIAGNOSIS — G89.29 BILATERAL CHRONIC KNEE PAIN: ICD-10-CM

## 2019-12-23 DIAGNOSIS — M79.671 BILATERAL FOOT PAIN: ICD-10-CM

## 2019-12-23 DIAGNOSIS — M79.672 BILATERAL FOOT PAIN: ICD-10-CM

## 2019-12-23 PROCEDURE — 97110 THERAPEUTIC EXERCISES: CPT | Mod: KX,PN

## 2019-12-23 PROCEDURE — 97116 GAIT TRAINING THERAPY: CPT | Mod: KX,PN

## 2019-12-23 NOTE — PROGRESS NOTES
"  Physical Therapy Daily Treatment Note      Name: Kylie Aleman  Clinic Number: [id number]     Therapy Diagnosis:           Encounter Diagnoses   Name Primary?    Bilateral chronic knee pain      Bilateral foot pain Yes    Unsteady gait      Weakness of lower extremity, unspecified laterality        Physician: Mannie Hutchison MD     Visit Date: 12/23/2019      Physician Orders: PT Eval and Treat  Medical Diagnosis: pain in both lower extremities, gait instability  Evaluation Date: 5/9/19  Authorization Period Expiration: 10/20/2020  New POC: 12/10/2019 to 1/31/2019     Visit #/Visits authorized: 13/36 (New Referral)- KX modifier  Total Visits: 39     Time In: 0902  Time Out: 0947  Total Billable Time: 45 minutes      Precautions: Standard     Subjective      Pt reports: Feeling ok today. Hurting because of arthritis in feet and knees   She was compliant with home exercise program.  Response to previous treatment: felt good  Functional change: progressing towards goals.      Pain: 3/10  Location: low back and feet     Objective      BOLD = new exercise or exercise progression     Pt arrives in manual wc, improved sitting posture. Pt rolls self into and out of clinic.     Kylie received therapeutic exercises to develop strength and endurance for 27 minutes including:      SciFit StepOne using hills on sprint setting 5, for 5 minutes to increase cardiovascular endurance. Rest break required after performed       sitting (freemotion): lat pull down 10# ea UE 2 x 10-NP   Wood chop high to low 10# 10x-NP    Standing:   Step up onto 6" step, 2x10 ea LE lead   3 laps backwards ambulation with B UE support  3 laps side stepping with 1-2 UE support >>> 3 laps with 1 UE support          Kylie participated in gait training for 18 minutes including:      Ambulated with RW approximately 280ft + 220ft + 220 ft 7 total laps (last 2 laps at end of session) with RW, mod I - sitting rest following each trial. Pt with " flexed posture and increased weight distribution through BUEs.      Parallel bars: static standing x 3 trials, attempted 30 sec intervals, intermittent UE support, SBA (only able to tolerate 10 sec)-NP     Pt exited session using manual wc.       Home Exercises Provided and Patient Education Provided      Education provided:   - encouraging pt to ambulate into and out of therapy sessions, as well as more at home     Written Home Exercises Provided: Patient instructed to cont prior HEP.  Exercises were reviewed and Kylie was able to demonstrate them prior to the end of the session.  [place] demonstrated good  understanding of the education provided.      See EMR under Patient Instructions for exercises provided prior visit.     Assessment   Kylie tolerated treatment well. PT advanced pt's level and duration on recumbent stepper. Pt shows increased motivation to improve endurance. Pt with less rest breaks throughout treatment and able to ambulate with improved posture following treatment session.  Pt prognosis is Fair.      Pt will continue to benefit from skilled outpatient physical therapy to address the deficits listed in the problem list box on initial evaluation, provide pt/family education and to maximize pt's level of independence in the home and community environment.      Pt's spiritual, cultural and educational needs considered and pt agreeable to plan of care and goals.     Anticipated barriers to physical therapy: LE weakness     Goals:   Short Term Goals:  2-3 weeks; updated 12/18/19  1.Report decreased in pain at worst less than <   / =  4  /10  to increase tolerance for functional activities. MET previously, inconsistent  2. Pt to improve B knee range of motion by 8 degrees to allow for improved functional mobility to allow for improvement in IADLs. MET  3. Increased B knee  MMT 1/2  to increase tolerance for ADL and work activities. MET  4. Pt to report an ability to perform stand pivot transfer  into her sofa. MET   5. Pt to tolerate HEP to improve ROM and independence with ADL's. MET     Long Term Goals:  6-8 weeks  1. Report decreased in pain at worse less than  <   / =  2  /10  to increase tolerance for functional mobility. ongoing  2. Pt to improve B knee range of motion by 8 degrees to allow for improved functional mobility to allow for improvement in IADLs.  MET  3. Increased B knee MMT 1 grade  to increase tolerance for ADL and work activities. MET  4. Pt will scores report at CJ level (20-40% impaired) on LEFS  to demonstrate increase in LE function with every day tasks. progressing  5. Pt to be Independent with HEP to improve ROM and independence with ADL's. MET  6. New goal 8/14/2019: Pt will decrease score on TUG to </= 30 seconds in order to make her ambulation more functional. met 12/18/19              Revised 12/18/19:  pt will perform TUG in <20 sec to demonstrate decreased fall risk and improvement in mobility for household distance to decrease w/c use.   7. New 12/19/19: pt will demonstrate improved general strength for being able to stand from various surfaces by performing 9 sit<>stands from chair without armrests.- 14x with arms supported on armrests  8. New 12/19/19: pt will perform static standing with intermittent UE support for 5 min to improve ability to perform some house hold duties (I.e. wiping a counter)- ~10 sec without UE support, 30 sec max with intermittent support        Plan     Focus on standing with decreased UE support. Continue to attempt to increase ambulation distance. Continue with core strengthening via Freemotion machine.      Vaibhav Tejada ,PTA  12/23/2019

## 2019-12-27 ENCOUNTER — CLINICAL SUPPORT (OUTPATIENT)
Dept: REHABILITATION | Facility: HOSPITAL | Age: 59
End: 2019-12-27
Attending: NEUROLOGICAL SURGERY
Payer: MEDICARE

## 2019-12-27 DIAGNOSIS — R29.898 WEAKNESS OF BOTH LOWER EXTREMITIES: ICD-10-CM

## 2019-12-27 DIAGNOSIS — M79.672 BILATERAL FOOT PAIN: ICD-10-CM

## 2019-12-27 DIAGNOSIS — R26.81 UNSTEADY GAIT: ICD-10-CM

## 2019-12-27 DIAGNOSIS — G89.29 BILATERAL CHRONIC KNEE PAIN: ICD-10-CM

## 2019-12-27 DIAGNOSIS — M79.671 BILATERAL FOOT PAIN: ICD-10-CM

## 2019-12-27 DIAGNOSIS — M25.562 BILATERAL CHRONIC KNEE PAIN: ICD-10-CM

## 2019-12-27 DIAGNOSIS — M25.561 BILATERAL CHRONIC KNEE PAIN: ICD-10-CM

## 2019-12-27 PROCEDURE — 97110 THERAPEUTIC EXERCISES: CPT | Mod: KX,PN | Performed by: PHYSICAL THERAPIST

## 2019-12-27 PROCEDURE — 97116 GAIT TRAINING THERAPY: CPT | Mod: KX,PN | Performed by: PHYSICAL THERAPIST

## 2019-12-27 NOTE — PROGRESS NOTES
"  Physical Therapy Daily Treatment Note      Name: Kylie Aleman  Clinic Number: [id number]     Therapy Diagnosis:           Encounter Diagnoses   Name Primary?    Bilateral chronic knee pain      Bilateral foot pain Yes    Unsteady gait      Weakness of lower extremity, unspecified laterality        Physician: Mannie Hutchison MD     Visit Date: 12/27/2019      Physician Orders: PT Eval and Treat  Medical Diagnosis: pain in both lower extremities, gait instability  Evaluation Date: 5/9/19  Authorization Period Expiration: 10/20/2020  New POC: 12/10/2019 to 1/31/2019     Visit #/Visits authorized: 14/36 (New Referral)- KX modifier  Total Visits: 40     Time In: 0900  Time Out: 0945  Total Billable Time: 45 minutes      Precautions: Standard     Functional Limitations Reports - G Codes  Category: mobility   Tool: FOTO  Score: 72% impairment     G codes 2/10    eval CK-CJ   8/9/19 CL-CJ   9/19/19 CK-CJ   10/30/19 CK-CJ   12/20/19 Cl-CJ             Subjective      Pt reports: Feeling good today. I walked for [place] and had to climb 12 steps  She was compliant with home exercise program.  Response to previous treatment: felt good  Functional change: progressing towards goals.      Pain: 4/10  Location: low back and feet and arms     Objective      BOLD = new exercise or exercise progression     Pt arrives in manual wc, improved sitting posture. Pt rolls self into and out of clinic.     Kylie received therapeutic exercises to develop strength and endurance for 26 minutes including:      SciFit StepOne using L 4, for 10 minutes to increase cardiovascular endurance. Rest break required after performed       sitting (freemotion): lat pull down 10# ea UE x 10, 13# 10x   Wood chop high to low 13# 10x    Standing:   Step up onto 6" step, 2x10 ea LE lead   3 laps side stepping with 1 UE support  Hip extension, BUE support 2 x 10        Kylie participated in gait training for 19 minutes including:      Ambulated " with RW approximately 200 ft x 3 with RW, mod I - sitting rest following each trial. Pt with flexed posture and increased weight distribution through BUEs.       Pt exited session using manual wc.       Home Exercises Provided and Patient Education Provided      Education provided:   - encouraging pt to ambulate more at home     Written Home Exercises Provided: Patient instructed to cont prior HEP.  Exercises were reviewed and Kylie was able to demonstrate them prior to the end of the session.  [place] demonstrated good  understanding of the education provided.      See EMR under Patient Instructions for exercises provided prior visit.     Assessment   Kylie tolerated treatment well. Pt was able to participate in 3 git trials with RW, but could not increase distance prior to requiring a sitting rest break. Pt continues to stand with increased flexion at hips and increased reliance on UE support. PT continues to address UE and LE strength to improve gait ability and posture. Pt demonstrated good tolerance to standing hip exetension strengthening and more standing strengthening activities will be added as pt tolerates. Pt required a sitting rest break after each standing activity. Pt can benefit from continued skilled PT to address weakness to improve ambulation and decrease need for w/c.      Kylie is progressing towards goals.   Pt prognosis is Fair.      Pt will continue to benefit from skilled outpatient physical therapy to address the deficits listed in the problem list box on initial evaluation, provide pt/family education and to maximize pt's level of independence in the home and community environment.      Pt's spiritual, cultural and educational needs considered and pt agreeable to plan of care and goals.     Anticipated barriers to physical therapy: LE weakness     Goals:   Short Term Goals:  2-3 weeks; updated 12/18/19  1.Report decreased in pain at worst less than <   / =  4  /10  to increase  tolerance for functional activities. MET previously, inconsistent  2. Pt to improve B knee range of motion by 8 degrees to allow for improved functional mobility to allow for improvement in IADLs. MET  3. Increased B knee  MMT 1/2  to increase tolerance for ADL and work activities. MET  4. Pt to report an ability to perform stand pivot transfer into her sofa. MET   5. Pt to tolerate HEP to improve ROM and independence with ADL's. MET     Long Term Goals:  6-8 weeks  1. Report decreased in pain at worse less than  <   / =  2  /10  to increase tolerance for functional mobility. ongoing  2. Pt to improve B knee range of motion by 8 degrees to allow for improved functional mobility to allow for improvement in IADLs.  MET  3. Increased B knee MMT 1 grade  to increase tolerance for ADL and work activities. MET  4. Pt will scores report at CJ level (20-40% impaired) on LEFS  to demonstrate increase in LE function with every day tasks. progressing  5. Pt to be Independent with HEP to improve ROM and independence with ADL's. MET  6. New goal 8/14/2019: Pt will decrease score on TUG to </= 30 seconds in order to make her ambulation more functional. met 12/18/19              Revised 12/18/19:  pt will perform TUG in <20 sec to demonstrate decreased fall risk and improvement in mobility for household distance to decrease w/c use.   7. New 12/19/19: pt will demonstrate improved general strength for being able to stand from various surfaces by performing 9 sit<>stands from chair without armrests.- 14x with arms supported on armrests  8. New 12/19/19: pt will perform static standing with intermittent UE support for 5 min to improve ability to perform some house hold duties (I.e. wiping a counter)- ~10 sec without UE support, 30 sec max with intermittent support        Plan     Focus on standing with decreased UE support. Continue to attempt to increase ambulation distance. Continue with core strengthening via Freemotion machine.       Yelena Ferrer,PT, DPT  12/27/2019

## 2019-12-30 ENCOUNTER — CLINICAL SUPPORT (OUTPATIENT)
Dept: REHABILITATION | Facility: HOSPITAL | Age: 59
End: 2019-12-30
Attending: NEUROLOGICAL SURGERY
Payer: MEDICARE

## 2019-12-30 ENCOUNTER — DOCUMENTATION ONLY (OUTPATIENT)
Dept: REHABILITATION | Facility: HOSPITAL | Age: 59
End: 2019-12-30

## 2019-12-30 DIAGNOSIS — G89.29 BILATERAL CHRONIC KNEE PAIN: ICD-10-CM

## 2019-12-30 DIAGNOSIS — M79.672 BILATERAL FOOT PAIN: ICD-10-CM

## 2019-12-30 DIAGNOSIS — M79.671 BILATERAL FOOT PAIN: ICD-10-CM

## 2019-12-30 DIAGNOSIS — M25.562 BILATERAL CHRONIC KNEE PAIN: ICD-10-CM

## 2019-12-30 DIAGNOSIS — R29.898 WEAKNESS OF BOTH LOWER EXTREMITIES: ICD-10-CM

## 2019-12-30 DIAGNOSIS — R26.81 UNSTEADY GAIT: ICD-10-CM

## 2019-12-30 DIAGNOSIS — M25.561 BILATERAL CHRONIC KNEE PAIN: ICD-10-CM

## 2019-12-30 PROCEDURE — 97110 THERAPEUTIC EXERCISES: CPT | Mod: PN

## 2019-12-30 PROCEDURE — 97116 GAIT TRAINING THERAPY: CPT | Mod: PN

## 2019-12-30 NOTE — PROGRESS NOTES
"  Physical Therapy Daily Treatment Note      Name: Kylie Aleman  Clinic Number: [id number]     Therapy Diagnosis:           Encounter Diagnoses   Name Primary?    Bilateral chronic knee pain      Bilateral foot pain Yes    Unsteady gait      Weakness of lower extremity, unspecified laterality        Physician: Mannie Hutchison MD     Visit Date: 12/30/2019      Physician Orders: PT Eval and Treat  Medical Diagnosis: pain in both lower extremities, gait instability  Evaluation Date: 5/9/19  Authorization Period Expiration: 10/20/2020  New POC: 12/10/2019 to 1/31/2019     Visit #/Visits authorized: 15/36 (New Referral)- KX modifier  Total Visits: 41     Time In: 0945  Time Out: 1045  Total Billable Time: 45 minutes      Precautions: Standard     Subjective      Pt reports:feeling good. The feet, back, knees hurt as usual because of arthritis.  She was compliant with home exercise program.  Response to previous treatment: felt good  Functional change: progressing towards goals.      Pain: 3/10  Location: low back and feet     Objective      BOLD = new exercise or exercise progression     Pt arrives in manual wc, improved sitting posture. Pt rolls self into and out of clinic.     Kylie received therapeutic exercises to develop strength and endurance for 25 minutes including:     Standing marches at RW 1x40     SciFit StepOne using hills on sprint setting 5, for 7 minutes to increase cardiovascular endurance. Rest break required after performed       sitting (freemotion): lat pull down 10# ea UE 3 x 10   Wood chop high to low 10# 2x10             Tricep/elbow extension 3# 2x15 each    Standing:   Step up onto 6" step, 2x10 ea LE lead -NP  3 laps backwards ambulation with B UE support-NP  3 laps side stepping with 1-2 UE support >>> 3 laps with 1 UE support-NP          Kylie participated in gait training for 20 minutes including:      Ambulated with RW approximately 280ft + 220ft + 220 ft 7 total laps (last 2 " laps at end of session) with RW, mod I - sitting rest following each trial. Pt with flexed posture and increased weight distribution through BUEs.      Parallel bars: static standing x 3 trials, attempted 30 sec intervals, intermittent UE support, SBA (only able to tolerate 10 sec)-NP     Pt exited session using manual wc.       Home Exercises Provided and Patient Education Provided      Education provided:   - encouraging pt to ambulate into and out of therapy sessions, as well as more at home     Written Home Exercises Provided: Patient instructed to cont prior HEP.  Exercises were reviewed and Kylie was able to demonstrate them prior to the end of the session.  Kylie demonstrated good  understanding of the education provided.      See EMR under Patient Instructions for exercises provided prior visit.     Assessment   Kylie tolerated treatment well. // bars in use today by another pt. Pt c/o pain in B triceps during gait secondary to reliance on UE support. AD raised after complaint. Pt with improved posture during gait. Pt performed new exercises with no complaints.  Pt prognosis is Fair.      Pt will continue to benefit from skilled outpatient physical therapy to address the deficits listed in the problem list box on initial evaluation, provide pt/family education and to maximize pt's level of independence in the home and community environment.      Pt's spiritual, cultural and educational needs considered and pt agreeable to plan of care and goals.     Anticipated barriers to physical therapy: LE weakness     Goals:   Short Term Goals:  2-3 weeks; updated 12/18/19  1.Report decreased in pain at worst less than <   / =  4  /10  to increase tolerance for functional activities. MET previously, inconsistent  2. Pt to improve B knee range of motion by 8 degrees to allow for improved functional mobility to allow for improvement in IADLs. MET  3. Increased B knee  MMT 1/2  to increase tolerance for ADL and work  activities. MET  4. Pt to report an ability to perform stand pivot transfer into her sofa. MET   5. Pt to tolerate HEP to improve ROM and independence with ADL's. MET     Long Term Goals:  6-8 weeks  1. Report decreased in pain at worse less than  <   / =  2  /10  to increase tolerance for functional mobility. ongoing  2. Pt to improve B knee range of motion by 8 degrees to allow for improved functional mobility to allow for improvement in IADLs.  MET  3. Increased B knee MMT 1 grade  to increase tolerance for ADL and work activities. MET  4. Pt will scores report at CJ level (20-40% impaired) on LEFS  to demonstrate increase in LE function with every day tasks. progressing  5. Pt to be Independent with HEP to improve ROM and independence with ADL's. MET  6. New goal 8/14/2019: Pt will decrease score on TUG to </= 30 seconds in order to make her ambulation more functional. met 12/18/19              Revised 12/18/19:  pt will perform TUG in <20 sec to demonstrate decreased fall risk and improvement in mobility for household distance to decrease w/c use.   7. New 12/19/19: pt will demonstrate improved general strength for being able to stand from various surfaces by performing 9 sit<>stands from chair without armrests.- 14x with arms supported on armrests  8. New 12/19/19: pt will perform static standing with intermittent UE support for 5 min to improve ability to perform some house hold duties (I.e. wiping a counter)- ~10 sec without UE support, 30 sec max with intermittent support        Plan     Focus on standing with decreased UE support. Continue to attempt to increase ambulation distance. Continue with core strengthening via Freemotion machine.      Vaibhav Tejada ,PTA  12/30/2019

## 2019-12-31 NOTE — PROGRESS NOTES
I, Yelena Ferrer, SHAHIDT, met with Vaibhav Tejada PTA to discuss this pt's POC. Pt is progressing well. Continue to address increasing gait and standing as pt tolerates. May perform standing hip exercises to progress standing duration. Continue with posterior chain strengthening to improve upright standing posture.       XANDER Foley PTA  12/30/2019

## 2020-01-01 ENCOUNTER — CLINICAL SUPPORT (OUTPATIENT)
Dept: REHABILITATION | Facility: HOSPITAL | Age: 60
End: 2020-01-01
Attending: NEUROLOGICAL SURGERY
Payer: MEDICARE

## 2020-01-01 ENCOUNTER — TELEPHONE (OUTPATIENT)
Dept: FAMILY MEDICINE | Facility: CLINIC | Age: 60
End: 2020-01-01

## 2020-01-01 ENCOUNTER — DOCUMENTATION ONLY (OUTPATIENT)
Dept: REHABILITATION | Facility: HOSPITAL | Age: 60
End: 2020-01-01

## 2020-01-01 ENCOUNTER — HOSPITAL ENCOUNTER (EMERGENCY)
Facility: HOSPITAL | Age: 60
Discharge: HOME | End: 2020-08-31
Attending: EMERGENCY MEDICINE
Payer: MEDICARE

## 2020-01-01 ENCOUNTER — TELEPHONE (OUTPATIENT)
Dept: NEUROLOGY | Facility: CLINIC | Age: 60
End: 2020-01-01

## 2020-01-01 VITALS
RESPIRATION RATE: 18 BRPM | DIASTOLIC BLOOD PRESSURE: 83 MMHG | SYSTOLIC BLOOD PRESSURE: 123 MMHG | HEIGHT: 62 IN | TEMPERATURE: 98 F | WEIGHT: 200 LBS | BODY MASS INDEX: 36.8 KG/M2 | HEART RATE: 105 BPM | OXYGEN SATURATION: 97 %

## 2020-01-01 DIAGNOSIS — Z74.09 IMPAIRED FUNCTIONAL MOBILITY, BALANCE, GAIT, AND ENDURANCE: ICD-10-CM

## 2020-01-01 DIAGNOSIS — M25.561 CHRONIC PAIN OF BOTH KNEES: ICD-10-CM

## 2020-01-01 DIAGNOSIS — G89.29 CHRONIC PAIN OF BOTH KNEES: ICD-10-CM

## 2020-01-01 DIAGNOSIS — M25.562 BILATERAL CHRONIC KNEE PAIN: Primary | ICD-10-CM

## 2020-01-01 DIAGNOSIS — G89.29 BILATERAL CHRONIC KNEE PAIN: Primary | ICD-10-CM

## 2020-01-01 DIAGNOSIS — M79.604 PAIN IN BOTH LOWER EXTREMITIES: ICD-10-CM

## 2020-01-01 DIAGNOSIS — M79.671 BILATERAL FOOT PAIN: ICD-10-CM

## 2020-01-01 DIAGNOSIS — R53.1 WEAKNESS GENERALIZED: ICD-10-CM

## 2020-01-01 DIAGNOSIS — M25.561 BILATERAL CHRONIC KNEE PAIN: ICD-10-CM

## 2020-01-01 DIAGNOSIS — M79.672 BILATERAL FOOT PAIN: ICD-10-CM

## 2020-01-01 DIAGNOSIS — M25.561 BILATERAL CHRONIC KNEE PAIN: Primary | ICD-10-CM

## 2020-01-01 DIAGNOSIS — R29.898 WEAKNESS OF BOTH LOWER EXTREMITIES: ICD-10-CM

## 2020-01-01 DIAGNOSIS — R26.81 GAIT INSTABILITY: Primary | ICD-10-CM

## 2020-01-01 DIAGNOSIS — R26.81 UNSTEADY GAIT: ICD-10-CM

## 2020-01-01 DIAGNOSIS — R53.1 WEAKNESS GENERALIZED: Primary | ICD-10-CM

## 2020-01-01 DIAGNOSIS — M25.562 BILATERAL CHRONIC KNEE PAIN: ICD-10-CM

## 2020-01-01 DIAGNOSIS — M25.562 CHRONIC PAIN OF BOTH KNEES: ICD-10-CM

## 2020-01-01 DIAGNOSIS — R26.81 GAIT INSTABILITY: ICD-10-CM

## 2020-01-01 DIAGNOSIS — G89.29 BILATERAL CHRONIC KNEE PAIN: ICD-10-CM

## 2020-01-01 DIAGNOSIS — B35.4 TINEA CORPORIS: Primary | ICD-10-CM

## 2020-01-01 DIAGNOSIS — M79.605 PAIN IN BOTH LOWER EXTREMITIES: ICD-10-CM

## 2020-01-01 DIAGNOSIS — R29.898 WEAKNESS OF LOWER EXTREMITY, UNSPECIFIED LATERALITY: ICD-10-CM

## 2020-01-01 LAB
ALBUMIN SERPL BCP-MCNC: 3.1 G/DL (ref 3.5–5.2)
ALP SERPL-CCNC: 216 U/L (ref 55–135)
ALT SERPL W/O P-5'-P-CCNC: 18 U/L (ref 10–44)
ANION GAP SERPL CALC-SCNC: 14 MMOL/L (ref 8–16)
AST SERPL-CCNC: 63 U/L (ref 10–40)
BASOPHILS # BLD AUTO: 0.04 K/UL (ref 0–0.2)
BASOPHILS NFR BLD: 0.4 % (ref 0–1.9)
BILIRUB SERPL-MCNC: 0.9 MG/DL (ref 0.1–1)
BUN SERPL-MCNC: 8 MG/DL (ref 8–23)
CALCIUM SERPL-MCNC: 6.8 MG/DL (ref 8.7–10.5)
CHLORIDE SERPL-SCNC: 102 MMOL/L (ref 95–110)
CK SERPL-CCNC: 129 U/L (ref 20–180)
CO2 SERPL-SCNC: 25 MMOL/L (ref 23–29)
CREAT SERPL-MCNC: 0.7 MG/DL (ref 0.5–1.4)
DIFFERENTIAL METHOD: ABNORMAL
EOSINOPHIL # BLD AUTO: 0.1 K/UL (ref 0–0.5)
EOSINOPHIL NFR BLD: 0.9 % (ref 0–8)
ERYTHROCYTE [DISTWIDTH] IN BLOOD BY AUTOMATED COUNT: 20.5 % (ref 11.5–14.5)
EST. GFR  (AFRICAN AMERICAN): >60 ML/MIN/1.73 M^2
EST. GFR  (NON AFRICAN AMERICAN): >60 ML/MIN/1.73 M^2
GLUCOSE SERPL-MCNC: 108 MG/DL (ref 70–110)
HCT VFR BLD AUTO: 27.4 % (ref 37–48.5)
HGB BLD-MCNC: 9.5 G/DL (ref 12–16)
IMM GRANULOCYTES # BLD AUTO: 0.05 K/UL (ref 0–0.04)
IMM GRANULOCYTES NFR BLD AUTO: 0.5 % (ref 0–0.5)
LYMPHOCYTES # BLD AUTO: 2.9 K/UL (ref 1–4.8)
LYMPHOCYTES NFR BLD: 29.4 % (ref 18–48)
MCH RBC QN AUTO: 39.6 PG (ref 27–31)
MCHC RBC AUTO-ENTMCNC: 34.7 G/DL (ref 32–36)
MCV RBC AUTO: 114 FL (ref 82–98)
MONOCYTES # BLD AUTO: 0.9 K/UL (ref 0.3–1)
MONOCYTES NFR BLD: 9.1 % (ref 4–15)
NEUTROPHILS # BLD AUTO: 5.9 K/UL (ref 1.8–7.7)
NEUTROPHILS NFR BLD: 59.7 % (ref 38–73)
NRBC BLD-RTO: 0 /100 WBC
PLATELET # BLD AUTO: 154 K/UL (ref 150–350)
PMV BLD AUTO: 10.8 FL (ref 9.2–12.9)
POTASSIUM SERPL-SCNC: 3.5 MMOL/L (ref 3.5–5.1)
PROT SERPL-MCNC: 6.7 G/DL (ref 6–8.4)
RBC # BLD AUTO: 2.4 M/UL (ref 4–5.4)
SODIUM SERPL-SCNC: 141 MMOL/L (ref 136–145)
WBC # BLD AUTO: 9.8 K/UL (ref 3.9–12.7)

## 2020-01-01 PROCEDURE — 97110 THERAPEUTIC EXERCISES: CPT | Mod: PN

## 2020-01-01 PROCEDURE — 97116 GAIT TRAINING THERAPY: CPT | Mod: PN

## 2020-01-01 PROCEDURE — 97110 THERAPEUTIC EXERCISES: CPT | Mod: KX,PN | Performed by: PHYSICAL THERAPIST

## 2020-01-01 PROCEDURE — 25000003 PHARM REV CODE 250: Performed by: EMERGENCY MEDICINE

## 2020-01-01 PROCEDURE — 97110 THERAPEUTIC EXERCISES: CPT | Mod: KX,PN,CQ

## 2020-01-01 PROCEDURE — 85025 COMPLETE CBC W/AUTO DIFF WBC: CPT

## 2020-01-01 PROCEDURE — 80053 COMPREHEN METABOLIC PANEL: CPT

## 2020-01-01 PROCEDURE — 99284 EMERGENCY DEPT VISIT MOD MDM: CPT

## 2020-01-01 PROCEDURE — 36000 PLACE NEEDLE IN VEIN: CPT

## 2020-01-01 PROCEDURE — 82550 ASSAY OF CK (CPK): CPT

## 2020-01-01 PROCEDURE — 97110 THERAPEUTIC EXERCISES: CPT | Mod: PN,CQ

## 2020-01-01 PROCEDURE — 97161 PT EVAL LOW COMPLEX 20 MIN: CPT | Mod: PN

## 2020-01-01 RX ORDER — PREGABALIN 75 MG/1
75 CAPSULE ORAL 2 TIMES DAILY
Qty: 180 CAPSULE | Refills: 0 | Status: SHIPPED | OUTPATIENT
Start: 2020-01-01 | End: 2021-01-01

## 2020-01-01 RX ORDER — DOXYLAMINE SUCCINATE 25 MG
TABLET ORAL ONCE
Status: COMPLETED | OUTPATIENT
Start: 2020-01-01 | End: 2020-01-01

## 2020-01-01 RX ORDER — DICLOFENAC SODIUM 10 MG/G
2 GEL TOPICAL 4 TIMES DAILY
Qty: 100 G | Refills: 0 | Status: SHIPPED | OUTPATIENT
Start: 2020-01-01

## 2020-01-01 RX ORDER — CALCIUM CARBONATE/VITAMIN D3 500 MG-10
1 TABLET,CHEWABLE ORAL DAILY
Qty: 100 EACH | Refills: 0 | Status: SHIPPED | OUTPATIENT
Start: 2020-01-01

## 2020-01-01 RX ORDER — NYSTATIN AND TRIAMCINOLONE ACETONIDE 100000; 1 [USP'U]/G; MG/G
CREAM TOPICAL 3 TIMES DAILY
Qty: 30 G | Refills: 0 | Status: SHIPPED | OUTPATIENT
Start: 2020-01-01

## 2020-01-01 RX ADMIN — MICONAZOLE NITRATE: 20 CREAM TOPICAL at 10:08

## 2020-01-03 ENCOUNTER — CLINICAL SUPPORT (OUTPATIENT)
Dept: REHABILITATION | Facility: HOSPITAL | Age: 60
End: 2020-01-03
Attending: NEUROLOGICAL SURGERY
Payer: MEDICARE

## 2020-01-03 DIAGNOSIS — R26.81 UNSTEADY GAIT: ICD-10-CM

## 2020-01-03 DIAGNOSIS — M79.671 BILATERAL FOOT PAIN: ICD-10-CM

## 2020-01-03 DIAGNOSIS — M25.562 BILATERAL CHRONIC KNEE PAIN: ICD-10-CM

## 2020-01-03 DIAGNOSIS — R29.898 WEAKNESS OF BOTH LOWER EXTREMITIES: ICD-10-CM

## 2020-01-03 DIAGNOSIS — M25.561 BILATERAL CHRONIC KNEE PAIN: ICD-10-CM

## 2020-01-03 DIAGNOSIS — M79.672 BILATERAL FOOT PAIN: ICD-10-CM

## 2020-01-03 DIAGNOSIS — G89.29 BILATERAL CHRONIC KNEE PAIN: ICD-10-CM

## 2020-01-03 PROCEDURE — 97110 THERAPEUTIC EXERCISES: CPT | Mod: PN | Performed by: PHYSICAL THERAPIST

## 2020-01-03 PROCEDURE — 97116 GAIT TRAINING THERAPY: CPT | Mod: PN | Performed by: PHYSICAL THERAPIST

## 2020-01-03 NOTE — PROGRESS NOTES
"  Physical Therapy Daily Treatment Note      Name: Kylie Aleman  Clinic Number: [id number]     Therapy Diagnosis:           Encounter Diagnoses   Name Primary?    Bilateral chronic knee pain      Bilateral foot pain Yes    Unsteady gait      Weakness of lower extremity, unspecified laterality        Physician: Mannie Hutchison MD     Visit Date: 1/3/2020      Physician Orders: PT Eval and Treat  Medical Diagnosis: pain in both lower extremities, gait instability  Evaluation Date: 5/9/19  Authorization Period Expiration: 10/20/2020  New POC: 12/10/2019 to 1/31/2019     Visit #/Visits authorized: 16/36 (New Referral)- KX modifier  Total Visits 2019: 43     Time In: 0734  Time Out: 0818  Total Billable Time: 44 minutes      Precautions: Standard     Subjective      Pt reports: no new complaints. Reports she is ambulating ~50% of the time  She was compliant with home exercise program.  Response to previous treatment: felt good  Functional change: progressing towards goals.      Pain: 5/10 at beginning of session. 3/10 at conclusion of session  Location: "alll over"     Objective      BOLD = new exercise or exercise progression     Pt arrives in manual wc, improved sitting posture. Pt rolls self into and out of clinic.     Kylie received therapeutic exercises to develop strength and endurance for 24 minutes including:     Standing marches at RW 1x40     BiotzFit StepOne using hills on sprint setting 4, for 10 minutes to increase cardiovascular endurance. Rest break required after performed       sitting (freemotion): lat pull down 10# ea UE 3 x 10   Wood chop high to low 10# 2x10             Tricep/elbow extension 3# 2x15 each    Standing:   Step up onto 6" step, 2x10 ea LE lead   3 laps side stepping with 1 UE support           Kylie participated in gait training for 20 minutes including:      Ambulated with RW approximately 220ft x x 2 + 110 ft with RW, mod I - sitting rest following each trial. Pt with flexed " posture and increased weight distribution through BUEs.      Parallel bars: static standing x 3 trials, attempted 30 sec intervals, intermittent UE support, SBA (only able to tolerate 15 sec)    3 laps side stepping with 1 UE support   Pt exited session using manual wc.       Home Exercises Provided and Patient Education Provided      Education provided:   - leave w/c at home when traveling to places that do not require excessive walking (I.e. Visiting family members)      Written Home Exercises Provided: Patient instructed to cont prior HEP.  Exercises were reviewed and [place] was able to demonstrate them prior to the end of the session.  Kylie demonstrated good  understanding of the education provided.      See EMR under Patient Instructions for exercises provided prior visit.     Assessment   [place] tolerated treatment well. Pt demonstrated good speed and tolerance to side stepping in parallel bars with 1 UE support, demonstrating improved trunk and and LE strength. Pt tolerated increased duration on recumbent stepper indicating improved activity and cardiovascular tolerance. Pt was able to tolerate a slightly longer duration in unsupported standing (15 sec max). Pt reports attempting decreased w/c use when going out of the house. Pt can benefit from continued skilled PT to decrease reliance on w/c.     Pt prognosis is Fair.   Pt is progressing towards goals.      Pt will continue to benefit from skilled outpatient physical therapy to address the deficits listed in the problem list box on initial evaluation, provide pt/family education and to maximize pt's level of independence in the home and community environment.      Pt's spiritual, cultural and educational needs considered and pt agreeable to plan of care and goals.     Anticipated barriers to physical therapy: LE weakness     Goals:   Short Term Goals:  2-3 weeks; updated 12/18/19  1.Report decreased in pain at worst less than <   / =  4  /10  to  increase tolerance for functional activities. MET previously, inconsistent  2. Pt to improve B knee range of motion by 8 degrees to allow for improved functional mobility to allow for improvement in IADLs. MET  3. Increased B knee  MMT 1/2  to increase tolerance for ADL and work activities. MET  4. Pt to report an ability to perform stand pivot transfer into her sofa. MET   5. Pt to tolerate HEP to improve ROM and independence with ADL's. MET     Long Term Goals:  6-8 weeks  1. Report decreased in pain at worse less than  <   / =  2  /10  to increase tolerance for functional mobility. ongoing  2. Pt to improve B knee range of motion by 8 degrees to allow for improved functional mobility to allow for improvement in IADLs.  MET  3. Increased B knee MMT 1 grade  to increase tolerance for ADL and work activities. MET  4. Pt will scores report at CJ level (20-40% impaired) on LEFS  to demonstrate increase in LE function with every day tasks. progressing  5. Pt to be Independent with HEP to improve ROM and independence with ADL's. MET  6. New goal 8/14/2019: Pt will decrease score on TUG to </= 30 seconds in order to make her ambulation more functional. met 12/18/19              Revised 12/18/19:  pt will perform TUG in <20 sec to demonstrate decreased fall risk and improvement in mobility for household distance to decrease w/c use.   7. New 12/19/19: pt will demonstrate improved general strength for being able to stand from various surfaces by performing 9 sit<>stands from chair without armrests.- 14x with arms supported on armrests  8. New 12/19/19: pt will perform static standing with intermittent UE support for 5 min to improve ability to perform some house hold duties (I.e. wiping a counter)- ~10 sec without UE support, 30 sec max with intermittent support        Plan     Focus on standing with decreased UE support. Continue with core strengthening via Freemotion machine. Address side stepping without UE support.         Yelena Ferrer,PT, DPT  1/3/2020

## 2020-01-06 ENCOUNTER — CLINICAL SUPPORT (OUTPATIENT)
Dept: REHABILITATION | Facility: HOSPITAL | Age: 60
End: 2020-01-06
Attending: NEUROLOGICAL SURGERY
Payer: MEDICARE

## 2020-01-06 DIAGNOSIS — G89.29 BILATERAL CHRONIC KNEE PAIN: ICD-10-CM

## 2020-01-06 DIAGNOSIS — M25.561 BILATERAL CHRONIC KNEE PAIN: ICD-10-CM

## 2020-01-06 DIAGNOSIS — R26.81 UNSTEADY GAIT: ICD-10-CM

## 2020-01-06 DIAGNOSIS — M25.562 BILATERAL CHRONIC KNEE PAIN: ICD-10-CM

## 2020-01-06 DIAGNOSIS — M79.672 BILATERAL FOOT PAIN: ICD-10-CM

## 2020-01-06 DIAGNOSIS — R29.898 WEAKNESS OF BOTH LOWER EXTREMITIES: ICD-10-CM

## 2020-01-06 DIAGNOSIS — M79.671 BILATERAL FOOT PAIN: ICD-10-CM

## 2020-01-06 PROCEDURE — 97110 THERAPEUTIC EXERCISES: CPT | Mod: KX,PN

## 2020-01-06 PROCEDURE — 97116 GAIT TRAINING THERAPY: CPT | Mod: KX,PN

## 2020-01-06 NOTE — PROGRESS NOTES
"  Physical Therapy Daily Treatment Note      Name: Kylie Aleman  Clinic Number: [id number]     Therapy Diagnosis:           Encounter Diagnoses   Name Primary?    Bilateral chronic knee pain      Bilateral foot pain Yes    Unsteady gait      Weakness of lower extremity, unspecified laterality        Physician: Mannie Hutchison MD     Visit Date: 1/6/2020      Physician Orders: PT Eval and Treat  Medical Diagnosis: pain in both lower extremities, gait instability  Evaluation Date: 5/9/19  Authorization Period Expiration: 10/20/2020  New POC: 12/10/2019 to 1/31/2019     Visit #/Visits authorized: 17/36 (New Referral)- KX modifier  Total Visits 2019: 43     Time In: 0823  Time Out: 0908  Total Billable Time: 45 minutes      Precautions: Standard     Subjective      Pt reports: doing well had a good weekend. I was able to go up and down 17steps for [employer]  She was compliant with home exercise program.  Response to previous treatment: felt good  Functional change: progressing towards goals.      Pain: 5/10 at beginning of session. 3/10 at conclusion of session  Location: "alll over"     Objective      BOLD = new exercise or exercise progression     Pt arrives in manual wc, improved sitting posture. Pt rolls self into and out of clinic.     Kylie received therapeutic exercises to develop strength and endurance for 25 minutes including:     Standing marches at RW 1x40-NP     SciFit StepOne using hills on sprint setting 4.5, for 10 minutes to increase cardiovascular endurance. Rest break required after performed       sitting (freemotion): lat pull down 10# ea UE 3 x 10-NP   Wood chop high to low 10# 2x10-NP             Tricep/elbow extension 3# 2x15 each-NP    Standing:   Step up onto 6" step, 2x10 ea LE lead   4 laps side stepping with 1 UE support   Marches in // bar 3x30          Kylie participated in gait training for 20minutes including:      Ambulated with RW approximately 220ft x  3 + 120ft  mod I - " sitting rest following each trial. Pt with flexed posture and increased weight distribution through BUEs.      Parallel bars: static standing x 3 trials, attempted 30 sec intervals, intermittent UE support, SBA (only able to tolerate 15 sec)    3 laps side stepping with 1 UE support   Pt exited session using manual wc.       Home Exercises Provided and Patient Education Provided      Education provided:   - leave w/c at home when traveling to places that do not require excessive walking (I.e. Visiting family members)      Written Home Exercises Provided: Patient instructed to cont prior HEP.  Exercises were reviewed and Kylie was able to demonstrate them prior to the end of the session.  [place] demonstrated good  understanding of the education provided.      See EMR under Patient Instructions for exercises provided prior visit.     Assessment   [place] tolerated treatment well.Pt arrives in WC to therapy. Pt tolerated increase in resistance with Nustep, increase standing tolerance, and increase in distance walked. Pt has improved upright posture during gait, increased gait speed and increased step height. Pt continues to progress well towards her goals.    Pt prognosis is Fair.   Pt is progressing towards goals.      Pt will continue to benefit from skilled outpatient physical therapy to address the deficits listed in the problem list box on initial evaluation, provide pt/family education and to maximize pt's level of independence in the home and community environment.      Pt's spiritual, cultural and educational needs considered and pt agreeable to plan of care and goals.     Anticipated barriers to physical therapy: LE weakness     Goals:   Short Term Goals:  2-3 weeks; updated 12/18/19  1.Report decreased in pain at worst less than <   / =  4  /10  to increase tolerance for functional activities. MET previously, inconsistent  2. Pt to improve B knee range of motion by 8 degrees to allow for improved  functional mobility to allow for improvement in IADLs. MET  3. Increased B knee  MMT 1/2  to increase tolerance for ADL and work activities. MET  4. Pt to report an ability to perform stand pivot transfer into her sofa. MET   5. Pt to tolerate HEP to improve ROM and independence with ADL's. MET     Long Term Goals:  6-8 weeks  1. Report decreased in pain at worse less than  <   / =  2  /10  to increase tolerance for functional mobility. ongoing  2. Pt to improve B knee range of motion by 8 degrees to allow for improved functional mobility to allow for improvement in IADLs.  MET  3. Increased B knee MMT 1 grade  to increase tolerance for ADL and work activities. MET  4. Pt will scores report at CJ level (20-40% impaired) on LEFS  to demonstrate increase in LE function with every day tasks. progressing  5. Pt to be Independent with HEP to improve ROM and independence with ADL's. MET  6. New goal 8/14/2019: Pt will decrease score on TUG to </= 30 seconds in order to make her ambulation more functional. met 12/18/19              Revised 12/18/19:  pt will perform TUG in <20 sec to demonstrate decreased fall risk and improvement in mobility for household distance to decrease w/c use.   7. New 12/19/19: pt will demonstrate improved general strength for being able to stand from various surfaces by performing 9 sit<>stands from chair without armrests.- 14x with arms supported on armrests  8. New 12/19/19: pt will perform static standing with intermittent UE support for 5 min to improve ability to perform some house hold duties (I.e. wiping a counter)- ~10 sec without UE support, 30 sec max with intermittent support        Plan     Focus on standing with decreased UE support. Continue with core strengthening via Freemotion machine. Address side stepping without UE support.        Vaibhav Tejada,PTA  1/6/2020

## 2020-01-08 ENCOUNTER — CLINICAL SUPPORT (OUTPATIENT)
Dept: REHABILITATION | Facility: HOSPITAL | Age: 60
End: 2020-01-08
Attending: NEUROLOGICAL SURGERY
Payer: MEDICARE

## 2020-01-08 DIAGNOSIS — M25.561 BILATERAL CHRONIC KNEE PAIN: ICD-10-CM

## 2020-01-08 DIAGNOSIS — G89.29 BILATERAL CHRONIC KNEE PAIN: ICD-10-CM

## 2020-01-08 DIAGNOSIS — M25.562 BILATERAL CHRONIC KNEE PAIN: ICD-10-CM

## 2020-01-08 DIAGNOSIS — R29.898 WEAKNESS OF BOTH LOWER EXTREMITIES: ICD-10-CM

## 2020-01-08 DIAGNOSIS — M79.671 BILATERAL FOOT PAIN: ICD-10-CM

## 2020-01-08 DIAGNOSIS — M79.672 BILATERAL FOOT PAIN: ICD-10-CM

## 2020-01-08 DIAGNOSIS — R26.81 UNSTEADY GAIT: ICD-10-CM

## 2020-01-08 PROCEDURE — 97116 GAIT TRAINING THERAPY: CPT | Mod: PN,CQ

## 2020-01-08 PROCEDURE — 97110 THERAPEUTIC EXERCISES: CPT | Mod: PN,CQ

## 2020-01-08 NOTE — PROGRESS NOTES
"  Physical Therapy Daily Treatment Note      Name: Kylie Aleman  Clinic Number: [id number]     Therapy Diagnosis:           Encounter Diagnoses   Name Primary?    Bilateral chronic knee pain      Bilateral foot pain Yes    Unsteady gait      Weakness of lower extremity, unspecified laterality        Physician: Mannie Hutchison MD     Visit Date: 1/8/2020      Physician Orders: PT Eval and Treat  Medical Diagnosis: pain in both lower extremities, gait instability  Evaluation Date: 5/9/19  Authorization Period Expiration: 10/20/2020  New POC: 12/10/2019 to 1/31/2019     Visit #/Visits authorized: 18/36 (New Referral)- KX modifier  Total Visits 2019: 43     Time In: 0815  Time Out: 0902  Total Billable Time: 47minutes      Precautions: Standard     Subjective      Pt reports: feeling good, insurance people have me mad.  She was compliant with home exercise program.  Response to previous treatment: felt good  Functional change: progressing towards goals.      Pain: 2/10   Location: "alll over"     Objective      BOLD = new exercise or exercise progression     Pt arrives in manual wc, improved sitting posture. Pt rolls self into and out of clinic.     Kylie received therapeutic exercises to develop strength and endurance for 25 minutes including:     Standing marches at RW 1x40-NP     SciFit StepOne using hills on sprint setting 4.5, for 8 minutes to increase cardiovascular endurance. Rest break required after performed       sitting (freemotion): lat pull down 10# ea UE 3 x 10-NP   Wood chop high to low 10# 2x10-NP             Tricep/elbow extension 3# 2x15 each-NP    Standing:   Step up onto 6" step, 2x10 ea LE lead -NP  5 laps side stepping with 1 UE support   Marches in // bar 2x40  STS from WC         Kylie participated in gait training for 20minutes including:      Ambulated with RW approximately 330ft x  2 + 210 at end of session   mod I - sitting rest following each trial. Pt with flexed posture and " increased weight distribution through BUEs.      Parallel bars: static standing x 3 trials, attempted 30 sec intervals, intermittent UE support, SBA (only able to tolerate 15 sec)-NP    3 laps side stepping with 1 UE support -NP  Pt exited session using manual wc.       Home Exercises Provided and Patient Education Provided      Education provided:   - leave w/c at home when traveling to places that do not require excessive walking (I.e. Visiting family members)      Written Home Exercises Provided: Patient instructed to cont prior HEP.  Exercises were reviewed and [place] was able to demonstrate them prior to the end of the session.  Kylie demonstrated good  understanding of the education provided.      See EMR under Patient Instructions for exercises provided prior visit.     Assessment   [place] tolerated treatment well. Pt continues to progress with CV and muscular endurance, no adverse affects or symptoms with increase in activity. Pt with increased gait speed, increased posture during gait and standing activities, and increased foot clearance with gait.    Pt prognosis is Fair.   Pt is progressing towards goals.      Pt will continue to benefit from skilled outpatient physical therapy to address the deficits listed in the problem list box on initial evaluation, provide pt/family education and to maximize pt's level of independence in the home and community environment.      Pt's spiritual, cultural and educational needs considered and pt agreeable to plan of care and goals.     Anticipated barriers to physical therapy: LE weakness     Goals:   Short Term Goals:  2-3 weeks; updated 12/18/19  1.Report decreased in pain at worst less than <   / =  4  /10  to increase tolerance for functional activities. MET previously, inconsistent  2. Pt to improve B knee range of motion by 8 degrees to allow for improved functional mobility to allow for improvement in IADLs. MET  3. Increased B knee  MMT 1/2  to increase  tolerance for ADL and work activities. MET  4. Pt to report an ability to perform stand pivot transfer into her sofa. MET   5. Pt to tolerate HEP to improve ROM and independence with ADL's. MET     Long Term Goals:  6-8 weeks  1. Report decreased in pain at worse less than  <   / =  2  /10  to increase tolerance for functional mobility. ongoing  2. Pt to improve B knee range of motion by 8 degrees to allow for improved functional mobility to allow for improvement in IADLs.  MET  3. Increased B knee MMT 1 grade  to increase tolerance for ADL and work activities. MET  4. Pt will scores report at CJ level (20-40% impaired) on LEFS  to demonstrate increase in LE function with every day tasks. progressing  5. Pt to be Independent with HEP to improve ROM and independence with ADL's. MET  6. New goal 8/14/2019: Pt will decrease score on TUG to </= 30 seconds in order to make her ambulation more functional. met 12/18/19              Revised 12/18/19:  pt will perform TUG in <20 sec to demonstrate decreased fall risk and improvement in mobility for household distance to decrease w/c use.   7. New 12/19/19: pt will demonstrate improved general strength for being able to stand from various surfaces by performing 9 sit<>stands from chair without armrests.- 14x with arms supported on armrests  8. New 12/19/19: pt will perform static standing with intermittent UE support for 5 min to improve ability to perform some house hold duties (I.e. wiping a counter)- ~10 sec without UE support, 30 sec max with intermittent support        Plan     Focus on standing with decreased UE support. Continue with core strengthening via Freemotion machine. Address side stepping without UE support.        Vaibhav Tejada,PTA  1/8/2020

## 2020-01-13 ENCOUNTER — CLINICAL SUPPORT (OUTPATIENT)
Dept: REHABILITATION | Facility: HOSPITAL | Age: 60
End: 2020-01-13
Attending: NEUROLOGICAL SURGERY
Payer: MEDICARE

## 2020-01-13 DIAGNOSIS — M25.562 BILATERAL CHRONIC KNEE PAIN: ICD-10-CM

## 2020-01-13 DIAGNOSIS — M79.672 BILATERAL FOOT PAIN: ICD-10-CM

## 2020-01-13 DIAGNOSIS — R26.81 UNSTEADY GAIT: ICD-10-CM

## 2020-01-13 DIAGNOSIS — M79.671 BILATERAL FOOT PAIN: ICD-10-CM

## 2020-01-13 DIAGNOSIS — M25.561 BILATERAL CHRONIC KNEE PAIN: ICD-10-CM

## 2020-01-13 DIAGNOSIS — G89.29 BILATERAL CHRONIC KNEE PAIN: ICD-10-CM

## 2020-01-13 DIAGNOSIS — R29.898 WEAKNESS OF BOTH LOWER EXTREMITIES: ICD-10-CM

## 2020-01-13 PROCEDURE — 97110 THERAPEUTIC EXERCISES: CPT | Mod: KX,PN,CQ

## 2020-01-13 PROCEDURE — 97116 GAIT TRAINING THERAPY: CPT | Mod: KX,PN,CQ

## 2020-01-13 NOTE — PROGRESS NOTES
"  Physical Therapy Daily Treatment Note      Name: Kylie Aleman  Clinic Number: [id number]     Therapy Diagnosis:           Encounter Diagnoses   Name Primary?    Bilateral chronic knee pain      Bilateral foot pain Yes    Unsteady gait      Weakness of lower extremity, unspecified laterality        Physician: Mannie Hutchison MD     Visit Date: 1/13/2020      Physician Orders: PT Eval and Treat  Medical Diagnosis: pain in both lower extremities, gait instability  Evaluation Date: 5/9/19  Authorization Period Expiration: 10/20/2020  New POC: 12/10/2019 to 1/31/2019     Visit #/Visits authorized: 19/36 (New Referral)- KX modifier  Total Visits 2019: 43     Time In: 0825 (10mins late)  Time Out: 0907  Total Billable Time: 42minutes      Precautions: Standard     Subjective      Pt reports: I'm feeling pretty good today.  She was compliant with home exercise program.  Response to previous treatment: felt good  Functional change: progressing towards goals.      Pain: 3/10   Location: B knees      Objective      BOLD = new exercise or exercise progression     Pt arrives in manual wc, improved sitting posture. Pt rolls self into and out of clinic.     Kylie received therapeutic exercises to develop strength and endurance for 18 minutes including:     Standing marches at RW 1x40-NP     SciFit StepOne using hills on sprint setting 4.5, for 8 minutes to increase cardiovascular endurance. Rest break required after performed       sitting (freemotion): lat pull down 10# ea UE 3 x 10-NP   Wood chop high to low 10# 2x10-NP             Tricep/elbow extension 3# 2x15 each-NP    Standing:   Step up onto 6" step, 2x10 ea LE lead -NP  5 laps side stepping with 1 UE support   Marches in // bar 2x40  STS from WC         Kylie participated in gait training for 25minutes including:      Ambulated with RW approximately 330ft x  2 + 210 at end of session   mod I - sitting rest following each trial. Pt with flexed posture and " increased weight distribution through BUEs.      Parallel bars: static standing x 3 trials, attempted 30 sec intervals, intermittent UE support, SBA (only able to tolerate 15 sec)-NP    3 laps side stepping with 1 UE support -NP  Pt exited session using manual wc.       Home Exercises Provided and Patient Education Provided      Education provided:   - leave w/c at home when traveling to places that do not require excessive walking (I.e. Visiting family members)      Written Home Exercises Provided: Patient instructed to cont prior HEP.  Exercises were reviewed and [place] was able to demonstrate them prior to the end of the session.  Kylie demonstrated good  understanding of the education provided.      See EMR under Patient Instructions for exercises provided prior visit.     Assessment   [place] tolerated treatment well. Pt arrived late to session. Increased gait speed today, but reports of UE pain and fatigue. Pt able to perform standing exercises with less rest breaks.    Pt prognosis is Fair.   Pt is progressing towards goals.      Pt will continue to benefit from skilled outpatient physical therapy to address the deficits listed in the problem list box on initial evaluation, provide pt/family education and to maximize pt's level of independence in the home and community environment.      Pt's spiritual, cultural and educational needs considered and pt agreeable to plan of care and goals.     Anticipated barriers to physical therapy: LE weakness     Goals:   Short Term Goals:  2-3 weeks; updated 12/18/19  1.Report decreased in pain at worst less than <   / =  4  /10  to increase tolerance for functional activities. MET previously, inconsistent  2. Pt to improve B knee range of motion by 8 degrees to allow for improved functional mobility to allow for improvement in IADLs. MET  3. Increased B knee  MMT 1/2  to increase tolerance for ADL and work activities. MET  4. Pt to report an ability to perform stand  pivot transfer into her sofa. MET   5. Pt to tolerate HEP to improve ROM and independence with ADL's. MET     Long Term Goals:  6-8 weeks  1. Report decreased in pain at worse less than  <   / =  2  /10  to increase tolerance for functional mobility. ongoing  2. Pt to improve B knee range of motion by 8 degrees to allow for improved functional mobility to allow for improvement in IADLs.  MET  3. Increased B knee MMT 1 grade  to increase tolerance for ADL and work activities. MET  4. Pt will scores report at CJ level (20-40% impaired) on LEFS  to demonstrate increase in LE function with every day tasks. progressing  5. Pt to be Independent with HEP to improve ROM and independence with ADL's. MET  6. New goal 8/14/2019: Pt will decrease score on TUG to </= 30 seconds in order to make her ambulation more functional. met 12/18/19              Revised 12/18/19:  pt will perform TUG in <20 sec to demonstrate decreased fall risk and improvement in mobility for household distance to decrease w/c use.   7. New 12/19/19: pt will demonstrate improved general strength for being able to stand from various surfaces by performing 9 sit<>stands from chair without armrests.- 14x with arms supported on armrests  8. New 12/19/19: pt will perform static standing with intermittent UE support for 5 min to improve ability to perform some house hold duties (I.e. wiping a counter)- ~10 sec without UE support, 30 sec max with intermittent support        Plan     Focus on standing with decreased UE support. Continue with core strengthening via Freemotion machine. Address side stepping without UE support.        Vaibhav Tejada,PTA  1/13/2020

## 2020-01-15 ENCOUNTER — CLINICAL SUPPORT (OUTPATIENT)
Dept: REHABILITATION | Facility: HOSPITAL | Age: 60
End: 2020-01-15
Attending: NEUROLOGICAL SURGERY
Payer: MEDICARE

## 2020-01-15 DIAGNOSIS — M25.562 BILATERAL CHRONIC KNEE PAIN: ICD-10-CM

## 2020-01-15 DIAGNOSIS — M25.561 BILATERAL CHRONIC KNEE PAIN: ICD-10-CM

## 2020-01-15 DIAGNOSIS — G89.29 BILATERAL CHRONIC KNEE PAIN: ICD-10-CM

## 2020-01-15 DIAGNOSIS — R29.898 WEAKNESS OF BOTH LOWER EXTREMITIES: ICD-10-CM

## 2020-01-15 DIAGNOSIS — R26.81 UNSTEADY GAIT: ICD-10-CM

## 2020-01-15 DIAGNOSIS — M79.672 BILATERAL FOOT PAIN: ICD-10-CM

## 2020-01-15 DIAGNOSIS — M79.671 BILATERAL FOOT PAIN: ICD-10-CM

## 2020-01-15 PROCEDURE — 97116 GAIT TRAINING THERAPY: CPT | Mod: PN | Performed by: PHYSICAL THERAPIST

## 2020-01-15 PROCEDURE — 97110 THERAPEUTIC EXERCISES: CPT | Mod: PN | Performed by: PHYSICAL THERAPIST

## 2020-01-17 NOTE — PLAN OF CARE
"  Physical Therapy Daily Treatment Note      Name: Kylie Aleman  Clinic Number: [id number]     Therapy Diagnosis:           Encounter Diagnoses   Name Primary?    Bilateral chronic knee pain      Bilateral foot pain Yes    Unsteady gait      Weakness of lower extremity, unspecified laterality        Physician: Mannie Hutchison MD     Visit Date: 1/15/2020      Physician Orders: PT Eval and Treat  Medical Diagnosis: pain in both lower extremities, gait instability  Evaluation Date: 19  Authorization Period Expiration: 10/20/2020  POC: 12/10/2019 to 2020   New POC: 2020 to 3/13/2020     Visit #/Visits authorized:    (Total Visits 2019: 43)     Time In: 0822  Time Out: 0900  Total Billable Time: 38 minutes      Precautions: Standard     Subjective      Pt reports: "I walk up 17 steps to attend [employer]. It's easier to go down vs. Going up. "No new complaints.  She was compliant with home exercise program.  Response to previous treatment: felt good, when home and swept, mopped, cooked  Functional change: progressing towards goals.      Pain: 3/10   Location: B knees and R elbow       Objective      BOLD = new exercise or exercise progression     Pt ambulated with RW into clinic via ramp from parking lot, then sat in w/c     Kylie received therapeutic exercises to develop strength and endurance for 28 minutes including:      SciFit StepOne using hills on sprint setting 5.0, for 8 minutes to increase cardiovascular endurance. Average 4.6 METs Rest break required after      sitting (freemotion): lat pull down 10# ea UE 3 x 10   Wood chop high to low 10# 2x10             Tricep/elbow extension 10# 3 x 10        Kylie participated in gait training for 10 minutes includin/18/19 1/15/20   TUG 28.5 sec w/ RW 19 sec w/RW   5 times sit<>stand 14x with arms on armrests 11x arms on armrests   6 min walk NT 2 min tolerated with RW= 228 ft   TUG norms: < 20 sec safe for independent transfers, < " 30 sec safe for dependent transfers/assist required     Ambulated with RW approximately 228ft  mod I - sitting rest following trial. Pt with flexed posture and increased weight distribution through BUEs. UE fatigue limited gait distance     Pt exited session using manual wc.       Home Exercises Provided and Patient Education Provided      Education provided:   - reviewed results of assessment and recommended continuation of PT     Written Home Exercises Provided: Patient instructed to cont prior HEP.  Exercises were reviewed and Kylie was able to demonstrate them prior to the end of the session.  [place] demonstrated good  understanding of the education provided.      See EMR under Patient Instructions for exercises provided prior visit.     Assessment   Assessment period: 12/20/2019 to 1/15/2020.   [place] tolerates PT sessions well. Today pt demonstrated progress with decreased reliance on w/c use. Pt walked into building for PT session from parking area with RW. Pt utilized w/c to enter PT gym from waiting area. Pt reports increased walking at home and decreased reliance on w/c. Pt continues to use w/c when performing cleaning and cooking tasks due to decreased standing endurance. Pt demonstrates improved speed of mobility via TUG with RW. Per 30 second sit<>stand test, pt demonstrates continued LE weakness and relies on UE use on armrests to stand. Pt was able to tolerate 2 minute walk test, but due to decreased activity tolerance, was unable to tolerate 6 minute walk test. Pt is only able to stand unsupported for 15 seconds with stand by assistance for posture and balance. Pt continues to stand and ambulate with increased flexion at hips and increased weight bearing on UE. Due to pt's continued progress with standing and gait tolerance, continued skilled PT is recommended to improve gait tolerance to decrease w/c use for household tasks and mobility.     Pt prognosis is Fair to good.   Pt is progressing  towards goals.      Pt will continue to benefit from skilled outpatient physical therapy to address the deficits listed in the problem list box on initial evaluation, provide pt/family education and to maximize pt's level of independence in the home and community environment.      Pt's spiritual, cultural and educational needs considered and pt agreeable to plan of care and goals.     Anticipated barriers to physical therapy: LE weakness     Goals:   Short Term Goals:  2-3 weeks; updated 1/15/2020  1.Report decreased in pain at worst less than <   / =  4  /10  to increase tolerance for functional activities. MET previously  2. Pt to improve B knee range of motion by 8 degrees to allow for improved functional mobility to allow for improvement in IADLs. MET  3. Increased B knee  MMT 1/2  to increase tolerance for ADL and work activities. MET  4. Pt to report an ability to perform stand pivot transfer into her sofa. MET   5. Pt to tolerate HEP to improve ROM and independence with ADL's. MET  6. New 1/15/2020: pt will tolerate 6 minute walk test to demonstrate improved gait tolerance.- 2 min walk with RW= 228 ft.      Long Term Goals:  6-8 weeks, updated 1/15/2020  1. Report decreased in pain at worse less than  <   / =  2  /10  to increase tolerance for functional mobility. ongoing  2. Pt to improve B knee range of motion by 8 degrees to allow for improved functional mobility to allow for improvement in IADLs.  MET  3. Increased B knee MMT 1 grade  to increase tolerance for ADL and work activities. MET  4. Pt will scores report at CJ level (20-40% impaired) on LEFS  to demonstrate increase in LE function with every day tasks. progressing  5. Pt to be Independent with HEP to improve ROM and independence with ADL's. MET  6. New goal 8/14/2019: Pt will decrease score on TUG to </= 30 seconds in order to make her ambulation more functional. met 12/18/19              Revised 12/18/19:  pt will perform TUG in <20 sec to  demonstrate decreased fall risk and improvement in mobility for household distance to decrease w/c use. Met 1/15/20  7. New 12/19/19: pt will demonstrate improved general strength for being able to stand from various surfaces by performing 9 sit<>stands from chair without armrests.- improved quality, still needs armrests  8. New 12/19/19: pt will perform static standing with intermittent UE support for 5 min to improve ability to perform some house hold duties (I.e. wiping a counter)- improved ~15 sec without UE support, SBA        Plan   New POC: 2/1/2020 to 3/13/2020. To include therapeutic activity, neuromuscular re education, gait training, and theraputic exercise.   Increase resistance for UE strengthening. Continue to increase gait and standing duration to decrease w/c use.        Yelena Ferrer,PT, DPT  1/15/2020

## 2020-01-20 ENCOUNTER — DOCUMENTATION ONLY (OUTPATIENT)
Dept: REHABILITATION | Facility: HOSPITAL | Age: 60
End: 2020-01-20

## 2020-01-20 NOTE — PROGRESS NOTES
I, Yelena Ferrer, DPT, met with Vaibhav Tejada PTA to discuss this pt's POC. Continue with POC, increase standing and gait as tolerated. Alternate UE strengthening days.     Yelena Ferrer,PT, DPT  Vaibhav Tejada PTA  1/20/2020

## 2020-01-22 ENCOUNTER — CLINICAL SUPPORT (OUTPATIENT)
Dept: REHABILITATION | Facility: HOSPITAL | Age: 60
End: 2020-01-22
Attending: NEUROLOGICAL SURGERY
Payer: MEDICARE

## 2020-01-22 DIAGNOSIS — G89.29 BILATERAL CHRONIC KNEE PAIN: ICD-10-CM

## 2020-01-22 DIAGNOSIS — M25.561 BILATERAL CHRONIC KNEE PAIN: ICD-10-CM

## 2020-01-22 DIAGNOSIS — M79.671 BILATERAL FOOT PAIN: ICD-10-CM

## 2020-01-22 DIAGNOSIS — R26.81 UNSTEADY GAIT: ICD-10-CM

## 2020-01-22 DIAGNOSIS — M79.672 BILATERAL FOOT PAIN: ICD-10-CM

## 2020-01-22 DIAGNOSIS — M25.562 BILATERAL CHRONIC KNEE PAIN: ICD-10-CM

## 2020-01-22 DIAGNOSIS — R29.898 WEAKNESS OF BOTH LOWER EXTREMITIES: ICD-10-CM

## 2020-01-22 PROCEDURE — 97110 THERAPEUTIC EXERCISES: CPT | Mod: KX,PN,CQ

## 2020-01-22 PROCEDURE — 97116 GAIT TRAINING THERAPY: CPT | Mod: KX,PN,CQ

## 2020-01-22 NOTE — PROGRESS NOTES
"  Physical Therapy Daily Treatment Note      Name: Kylie Aleman  Clinic Number: [id number]     Therapy Diagnosis:           Encounter Diagnoses   Name Primary?    Bilateral chronic knee pain      Bilateral foot pain Yes    Unsteady gait      Weakness of lower extremity, unspecified laterality        Physician: Mannie Hutchison MD     Visit Date: 1/22/2020      Physician Orders: PT Eval and Treat  Medical Diagnosis: pain in both lower extremities, gait instability  Evaluation Date: 5/9/19  Authorization Period Expiration: 10/20/2020  New POC: 12/10/2019 to 1/31/2019     Visit #/Visits authorized: 21/36 (New Referral)- KX modifier  Total Visits 2019: 43     Time In: 0815  Time Out: 0915  Total Billable Time: 42minutes      Precautions: Standard     Subjective      Pt reports: not feeling too well. I'm hurting especially in my feet and hands.  She was compliant with home exercise program.  Response to previous treatment: felt good  Functional change: progressing towards goals.      Pain: 3/10   Location: B feet and hands     Objective      BOLD = new exercise or exercise progression     Pt arrives in manual wc, improved sitting posture. Pt rolls self into and out of clinic.     Kylie received therapeutic exercises to develop strength and endurance for 30 minutes including:     Standing marches at RW 1x40     SciFit StepOne using hills on sprint setting 4.5, for 10 minutes to increase cardiovascular endurance. Rest break required after performed       sitting (freemotion): lat pull down 10# ea UE 3 x 15   Wood chop high to low 10# 3x10             Tricep/elbow extension 3# 3x15 each    Standing: -NP  Step up onto 6" step, 2x10 ea LE lead -NP  5 laps side stepping with 1 UE support -NP  Marches in // bar 2x40-NP  STS from WC 3x10        Kylie participated in gait training for 25minutes including:      Ambulated with RW approximately 330ft x  2 + 210 at end of session   mod I - sitting rest following each " trial. Pt with flexed posture and increased weight distribution through BUEs.      Parallel bars: static standing x 3 trials, attempted 30 sec intervals, intermittent UE support, SBA (only able to tolerate 15 sec)-NP    3 laps side stepping with 1 UE support -NP  Pt exited session using manual wc.       Home Exercises Provided and Patient Education Provided      Education provided:   - leave w/c at home when traveling to places that do not require excessive walking (I.e. Visiting family members)      Written Home Exercises Provided: Patient instructed to cont prior HEP.  Exercises were reviewed and [place] was able to demonstrate them prior to the end of the session.  Kylie demonstrated good  understanding of the education provided.      See EMR under Patient Instructions for exercises provided prior visit.     Assessment   Kylie tolerated treatment well. Pt with decreased activity tolerance today. Pt required frequent rest break and did not perform as many exercises or ambulate as far as past treatment sessions.    Pt prognosis is Fair.   Pt is progressing towards goals.      Pt will continue to benefit from skilled outpatient physical therapy to address the deficits listed in the problem list box on initial evaluation, provide pt/family education and to maximize pt's level of independence in the home and community environment.      Pt's spiritual, cultural and educational needs considered and pt agreeable to plan of care and goals.     Anticipated barriers to physical therapy: LE weakness     Goals:   Short Term Goals:  2-3 weeks; updated 12/18/19  1.Report decreased in pain at worst less than <   / =  4  /10  to increase tolerance for functional activities. MET previously, inconsistent  2. Pt to improve B knee range of motion by 8 degrees to allow for improved functional mobility to allow for improvement in IADLs. MET  3. Increased B knee  MMT 1/2  to increase tolerance for ADL and work activities. MET  4.  Pt to report an ability to perform stand pivot transfer into her sofa. MET   5. Pt to tolerate HEP to improve ROM and independence with ADL's. MET     Long Term Goals:  6-8 weeks  1. Report decreased in pain at worse less than  <   / =  2  /10  to increase tolerance for functional mobility. ongoing  2. Pt to improve B knee range of motion by 8 degrees to allow for improved functional mobility to allow for improvement in IADLs.  MET  3. Increased B knee MMT 1 grade  to increase tolerance for ADL and work activities. MET  4. Pt will scores report at CJ level (20-40% impaired) on LEFS  to demonstrate increase in LE function with every day tasks. progressing  5. Pt to be Independent with HEP to improve ROM and independence with ADL's. MET  6. New goal 8/14/2019: Pt will decrease score on TUG to </= 30 seconds in order to make her ambulation more functional. met 12/18/19              Revised 12/18/19:  pt will perform TUG in <20 sec to demonstrate decreased fall risk and improvement in mobility for household distance to decrease w/c use.   7. New 12/19/19: pt will demonstrate improved general strength for being able to stand from various surfaces by performing 9 sit<>stands from chair without armrests.- 14x with arms supported on armrests  8. New 12/19/19: pt will perform static standing with intermittent UE support for 5 min to improve ability to perform some house hold duties (I.e. wiping a counter)- ~10 sec without UE support, 30 sec max with intermittent support        Plan     Focus on standing with decreased UE support. Continue with core strengthening via Freemotion machine. Address side stepping without UE support.        Vaibhav Tejada,PTA  1/22/2020

## 2020-01-27 ENCOUNTER — CLINICAL SUPPORT (OUTPATIENT)
Dept: REHABILITATION | Facility: HOSPITAL | Age: 60
End: 2020-01-27
Attending: NEUROLOGICAL SURGERY
Payer: MEDICARE

## 2020-01-27 DIAGNOSIS — M79.671 BILATERAL FOOT PAIN: ICD-10-CM

## 2020-01-27 DIAGNOSIS — M79.672 BILATERAL FOOT PAIN: ICD-10-CM

## 2020-01-27 DIAGNOSIS — G89.29 BILATERAL CHRONIC KNEE PAIN: ICD-10-CM

## 2020-01-27 DIAGNOSIS — M25.562 BILATERAL CHRONIC KNEE PAIN: ICD-10-CM

## 2020-01-27 DIAGNOSIS — M25.561 BILATERAL CHRONIC KNEE PAIN: ICD-10-CM

## 2020-01-27 DIAGNOSIS — R29.898 WEAKNESS OF BOTH LOWER EXTREMITIES: ICD-10-CM

## 2020-01-27 DIAGNOSIS — R26.81 UNSTEADY GAIT: ICD-10-CM

## 2020-01-27 PROCEDURE — 97116 GAIT TRAINING THERAPY: CPT | Mod: KX,PN,CQ

## 2020-01-27 PROCEDURE — 97110 THERAPEUTIC EXERCISES: CPT | Mod: KX,PN,CQ

## 2020-01-27 NOTE — PROGRESS NOTES
"  Physical Therapy Daily Treatment Note      Name: Kylie Aleman  Clinic Number: [id number]     Therapy Diagnosis:           Encounter Diagnoses   Name Primary?    Bilateral chronic knee pain      Bilateral foot pain Yes    Unsteady gait      Weakness of lower extremity, unspecified laterality        Physician: Mannie Hutchison MD     Visit Date: 1/27/2020      Physician Orders: PT Eval and Treat  Medical Diagnosis: pain in both lower extremities, gait instability  Evaluation Date: 5/9/19  Authorization Period Expiration: 10/20/2020  New POC: 12/10/2019 to 1/31/2019     Visit #/Visits authorized: 21/36 (New Referral)- KX modifier  Total Visits 2019: 43     Time In: 823(8 mins late)  Time Out: 0903  Total Billable Time: 40 minutes      Precautions: Standard     Subjective      Pt reports: I'm doing pretty good. My knees are hurting today.  She was compliant with home exercise program.   Response to previous treatment: felt good  Functional change: progressing towards goals.      Pain: 3/10   Location: B Knees     Objective      BOLD = new exercise or exercise progression     Pt arrives in manual wc, improved sitting posture. Pt rolls self into and out of clinic.     Kylie received therapeutic exercises to develop strength and endurance for 24 minutes including:     Standing marches at RW 1x40 & 1x20     SciFit StepOne using hills on sprint setting 5, for 8 minutes to increase cardiovascular endurance. Rest break required after performed       sitting (freemotion): lat pull down 10# ea UE 3 x 15-NP   Wood chop high to low 10# 3x10-NP             Tricep/elbow extension 3# 3x15 each-NP    Standing: -  Step up onto 6" step, 2x10 ea LE lead   5 laps side stepping with 1 UE support   Marches in // bar 2x40-NP  STS from WC 3x10-NP        Kylie participated in gait training for 16 minutes including:      Ambulated with RW approximately 330ft x  2 + 210 at end of session   mod I - sitting rest following each trial. " Pt with flexed posture and increased weight distribution through BUEs.      Parallel bars: static standing x 3 trials, attempted 30 sec intervals, intermittent UE support, SBA (only able to tolerate 15 sec)-NP    3 laps side stepping with 1 UE support -NP  Pt exited session using manual wc.       Home Exercises Provided and Patient Education Provided      Education provided:   - leave w/c at home when traveling to places that do not require excessive walking (I.e. Visiting family members)      Written Home Exercises Provided: Patient instructed to cont prior HEP.  Exercises were reviewed and [place] was able to demonstrate them prior to the end of the session.  Kylie demonstrated good  understanding of the education provided.      See EMR under Patient Instructions for exercises provided prior visit.     Assessment   Pt tolerated session well today. Pt arrived in her new WC today. Pt has increased endurance with standing marches at RW, but required cues for improved standing posture. Pt seated rest break duration between exercises is shorter, timed at 45seconds    Pt prognosis is Fair.   Pt is progressing towards goals.      Pt will continue to benefit from skilled outpatient physical therapy to address the deficits listed in the problem list box on initial evaluation, provide pt/family education and to maximize pt's level of independence in the home and community environment.      Pt's spiritual, cultural and educational needs considered and pt agreeable to plan of care and goals.     Anticipated barriers to physical therapy: LE weakness     Goals:   Short Term Goals:  2-3 weeks; updated 12/18/19  1.Report decreased in pain at worst less than <   / =  4  /10  to increase tolerance for functional activities. MET previously, inconsistent  2. Pt to improve B knee range of motion by 8 degrees to allow for improved functional mobility to allow for improvement in IADLs. MET  3. Increased B knee  MMT 1/2  to increase  tolerance for ADL and work activities. MET  4. Pt to report an ability to perform stand pivot transfer into her sofa. MET   5. Pt to tolerate HEP to improve ROM and independence with ADL's. MET     Long Term Goals:  6-8 weeks  1. Report decreased in pain at worse less than  <   / =  2  /10  to increase tolerance for functional mobility. ongoing  2. Pt to improve B knee range of motion by 8 degrees to allow for improved functional mobility to allow for improvement in IADLs.  MET  3. Increased B knee MMT 1 grade  to increase tolerance for ADL and work activities. MET  4. Pt will scores report at CJ level (20-40% impaired) on LEFS  to demonstrate increase in LE function with every day tasks. progressing  5. Pt to be Independent with HEP to improve ROM and independence with ADL's. MET  6. New goal 8/14/2019: Pt will decrease score on TUG to </= 30 seconds in order to make her ambulation more functional. met 12/18/19              Revised 12/18/19:  pt will perform TUG in <20 sec to demonstrate decreased fall risk and improvement in mobility for household distance to decrease w/c use.   7. New 12/19/19: pt will demonstrate improved general strength for being able to stand from various surfaces by performing 9 sit<>stands from chair without armrests.- 14x with arms supported on armrests  8. New 12/19/19: pt will perform static standing with intermittent UE support for 5 min to improve ability to perform some house hold duties (I.e. wiping a counter)- ~10 sec without UE support, 30 sec max with intermittent support        Plan     Focus on standing with decreased UE support. Continue with core strengthening via Freemotion machine. Address side stepping without UE support.        Vaibhav Tejada,PTA  1/27/2020

## 2020-01-29 ENCOUNTER — CLINICAL SUPPORT (OUTPATIENT)
Dept: REHABILITATION | Facility: HOSPITAL | Age: 60
End: 2020-01-29
Attending: NEUROLOGICAL SURGERY
Payer: MEDICARE

## 2020-01-29 DIAGNOSIS — R26.81 UNSTEADY GAIT: ICD-10-CM

## 2020-01-29 DIAGNOSIS — M25.561 BILATERAL CHRONIC KNEE PAIN: ICD-10-CM

## 2020-01-29 DIAGNOSIS — M79.671 BILATERAL FOOT PAIN: ICD-10-CM

## 2020-01-29 DIAGNOSIS — M25.562 BILATERAL CHRONIC KNEE PAIN: ICD-10-CM

## 2020-01-29 DIAGNOSIS — M79.672 BILATERAL FOOT PAIN: ICD-10-CM

## 2020-01-29 DIAGNOSIS — R29.898 WEAKNESS OF BOTH LOWER EXTREMITIES: ICD-10-CM

## 2020-01-29 DIAGNOSIS — G89.29 BILATERAL CHRONIC KNEE PAIN: ICD-10-CM

## 2020-01-29 PROCEDURE — 97110 THERAPEUTIC EXERCISES: CPT | Mod: PN | Performed by: PHYSICAL THERAPIST

## 2020-01-29 NOTE — PROGRESS NOTES
"  Physical Therapy Daily Treatment Note      Name: Kylie Aleman  Clinic Number: [id number]     Therapy Diagnosis:           Encounter Diagnoses   Name Primary?    Bilateral chronic knee pain      Bilateral foot pain Yes    Unsteady gait      Weakness of lower extremity, unspecified laterality        Physician: Mannie Hutchison MD     Visit Date: 1/29/2020      Physician Orders: PT Eval and Treat  Medical Diagnosis: pain in both lower extremities, gait instability  Evaluation Date: 5/9/19  Authorization Period Expiration: 10/20/2020  POC: 12/10/2019 to 1/31/2019  New POC: 2/1/2020 to 3/13/2020    Visit #/Visits authorized: 22/36   (Total Visits 2019: 43)     Time In: 0830 (~15 mins late)  Time Out: 0915  Total Billable Time: 38 minutes   Total treatment time: 45 minutes     Precautions: Standard     Subjective      Pt reports: I'm doing pretty good. I'm late  She was compliant with home exercise program.   Response to previous treatment: felt good, no adverse effects reported  Functional change: progressing towards goals.      Pain: 3/10   Location: B Knees     Objective      BOLD = new exercise or exercise progression     Pt arrives with RW from parking lot>clinic, then used manual wc into gym. Pt rolls self into and out of gym.     Kylie received therapeutic exercises to develop strength and endurance for 38 minutes including:      SciFit StepOne using hills on sprint setting 5.5, for 8 minutes to increase cardiovascular endurance. Rest break required after performed       sitting (freemotion): lat pull down 10# ea UE 3 x 15   Wood chop high to low 10# 3x10             Tricep/elbow extension 7# 3x15 each    Standing: -  Hip abduction 2 x 10, BUE support  Heel raises 2 x 10, BUE support        Ambulated with RW approximately 210 ft x 2 (average time 1'30") mod I - sitting rest following each trial. Pt with flexed posture and increased weight distribution through BUEs.      Parallel bars: static standing x 3 " trials, attempted 30 sec intervals, intermittent UE support, SBA- 21 sec + 26 sec + 26 sec    3 laps side stepping with 1 UE support     Pt exited session using manual wc.       Home Exercises Provided and Patient Education Provided      Education provided:   - continue with HEP  - reviewed progressions made during session (I.e. Increased resistance with UE strengthening)     Written Home Exercises Provided: Patient instructed to cont prior HEP.  Exercises were reviewed and [place] was able to demonstrate them prior to the end of the session.  Kylie demonstrated good  understanding of the education provided.      See EMR under Patient Instructions for exercises provided prior visit.     Assessment   Kylie tolerated today's session well. Pt demonstrates improved gait velocity during gait trails with RW today. She tolerated additional standing exercises and increased resistance for triceps strengthening. Pt can benefit from continued skilled PT to improve mobility to decrease need for w/c for mobility.      Pt prognosis is Fair.   Pt is progressing towards goals.      Pt will continue to benefit from skilled outpatient physical therapy to address the deficits listed in the problem list box on initial evaluation, provide pt/family education and to maximize pt's level of independence in the home and community environment.      Pt's spiritual, cultural and educational needs considered and pt agreeable to plan of care and goals.     Anticipated barriers to physical therapy: LE weakness     Goals:   Short Term Goals:  2-3 weeks; updated 1/15/2020  1.Report decreased in pain at worst less than <   / =  4  /10  to increase tolerance for functional activities. MET previously  2. Pt to improve B knee range of motion by 8 degrees to allow for improved functional mobility to allow for improvement in IADLs. MET  3. Increased B knee  MMT 1/2  to increase tolerance for ADL and work activities. MET  4. Pt to report an ability  to perform stand pivot transfer into her sofa. MET   5. Pt to tolerate HEP to improve ROM and independence with ADL's. MET  6. New 1/15/2020: pt will tolerate 6 minute walk test to demonstrate improved gait tolerance.- 2 min walk with RW= 228 ft.      Long Term Goals:  6-8 weeks, updated 1/15/2020  1. Report decreased in pain at worse less than  <   / =  2  /10  to increase tolerance for functional mobility. ongoing  2. Pt to improve B knee range of motion by 8 degrees to allow for improved functional mobility to allow for improvement in IADLs.  MET  3. Increased B knee MMT 1 grade  to increase tolerance for ADL and work activities. MET  4. Pt will scores report at CJ level (20-40% impaired) on LEFS  to demonstrate increase in LE function with every day tasks. progressing  5. Pt to be Independent with HEP to improve ROM and independence with ADL's. MET  6. New goal 8/14/2019: Pt will decrease score on TUG to </= 30 seconds in order to make her ambulation more functional. met 12/18/19              Revised 12/18/19:  pt will perform TUG in <20 sec to demonstrate decreased fall risk and improvement in mobility for household distance to decrease w/c use. Met 1/15/20  7. New 12/19/19: pt will demonstrate improved general strength for being able to stand from various surfaces by performing 9 sit<>stands from chair without armrests.- improved quality, still needs armrests  8. New 12/19/19: pt will perform static standing with intermittent UE support for 5 min to improve ability to perform some house hold duties (I.e. wiping a counter)- improved ~15 sec without UE support, SBA         Plan   Add more standing exercises/ balance activities as tolerated       Yelena Ferrer,PT, DPT  1/29/2020

## 2020-02-03 ENCOUNTER — CLINICAL SUPPORT (OUTPATIENT)
Dept: REHABILITATION | Facility: HOSPITAL | Age: 60
End: 2020-02-03
Attending: NEUROLOGICAL SURGERY
Payer: MEDICARE

## 2020-02-03 DIAGNOSIS — M79.672 BILATERAL FOOT PAIN: ICD-10-CM

## 2020-02-03 DIAGNOSIS — R26.81 UNSTEADY GAIT: ICD-10-CM

## 2020-02-03 DIAGNOSIS — M25.561 BILATERAL CHRONIC KNEE PAIN: ICD-10-CM

## 2020-02-03 DIAGNOSIS — M79.671 BILATERAL FOOT PAIN: ICD-10-CM

## 2020-02-03 DIAGNOSIS — R29.898 WEAKNESS OF BOTH LOWER EXTREMITIES: ICD-10-CM

## 2020-02-03 DIAGNOSIS — M25.562 BILATERAL CHRONIC KNEE PAIN: ICD-10-CM

## 2020-02-03 DIAGNOSIS — G89.29 BILATERAL CHRONIC KNEE PAIN: ICD-10-CM

## 2020-02-03 PROCEDURE — 97116 GAIT TRAINING THERAPY: CPT | Mod: KX,PN,CQ

## 2020-02-03 PROCEDURE — 97110 THERAPEUTIC EXERCISES: CPT | Mod: KX,PN,CQ

## 2020-02-03 NOTE — PROGRESS NOTES
"  Physical Therapy Daily Treatment Note      Name: Kylie Aleman  Clinic Number: [id number]     Therapy Diagnosis:           Encounter Diagnoses   Name Primary?    Bilateral chronic knee pain      Bilateral foot pain Yes    Unsteady gait      Weakness of lower extremity, unspecified laterality        Physician: Mannie Hutchison MD     Visit Date: 2/3/2020      Physician Orders: PT Eval and Treat  Medical Diagnosis: pain in both lower extremities, gait instability  Evaluation Date: 5/9/19  Authorization Period Expiration: 10/20/2020  POC: 12/10/2019 to 1/31/2019  New POC: 2/1/2020 to 3/13/2020    Visit #/Visits authorized: 23/36   (Total Visits 2019: 43)     Time In: 0815  Time Out: 0900  Total Billable Time: 45minutes   Total treatment time: 45 minutes     Precautions: Standard     Subjective      Pt reports: I'm hurting some today and have a sinus cold.  She was compliant with home exercise program.   Response to previous treatment: none  Functional change: progressing towards goals.      Pain: 3/10   Location: B Knees, feet     Objective      BOLD = new exercise or exercise progression     Pt arrives with RW from parking lot>clinic, then used manual wc into gym. Pt rolls self into and out of gym.     Kylie received therapeutic exercises to develop strength and endurance for 38 minutes including:      SciFit StepOne using hills on sprint setting 5.5, for 10 minutes to increase cardiovascular endurance. Rest break required after performed       sitting (freemotion): lat pull down 10# ea UE 3 x 15   Wood chop high to low 10# 3x10             Tricep/elbow extension 7# 3x15 each    Standing: -  Hip abduction 3 x 10, BUE support 2#  Heel raises 3 x 10, BUE support 2#  Marches 3x10 2#     Ambulated with RW approximately 210 ft x 3 (average time 1'30") mod I - sitting rest following each trial. Pt with flexed posture and increased weight distribution through BUEs.      Parallel bars: static standing x 3 trials, " attempted 30 sec intervals, intermittent UE support, SBA- 21 sec + 26 sec + 26 sec-NP    3 laps side stepping with 1 UE support -NP    Pt exited session using manual wc.       Home Exercises Provided and Patient Education Provided      Education provided:   - continue with HEP  - reviewed progressions made during session (I.e. Increased resistance with UE strengthening)     Written Home Exercises Provided: Patient instructed to cont prior HEP.  Exercises were reviewed and [place] was able to demonstrate them prior to the end of the session.  [place] demonstrated good  understanding of the education provided.      See EMR under Patient Instructions for exercises provided prior visit.     Assessment   [place] tolerated today's session well. Pt became fatigued quicker during gait secondary to SOB. Pt tolerated addition of weight to standing exercises well. Pt was able to complete 2 sets with standing rest break between before needing to sit before performing 3rd set.      Pt prognosis is Fair.   Pt is progressing towards goals.      Pt will continue to benefit from skilled outpatient physical therapy to address the deficits listed in the problem list box on initial evaluation, provide pt/family education and to maximize pt's level of independence in the home and community environment.      Pt's spiritual, cultural and educational needs considered and pt agreeable to plan of care and goals.     Anticipated barriers to physical therapy: LE weakness     Goals:   Short Term Goals:  2-3 weeks; updated 1/15/2020  1.Report decreased in pain at worst less than <   / =  4  /10  to increase tolerance for functional activities. MET previously  2. Pt to improve B knee range of motion by 8 degrees to allow for improved functional mobility to allow for improvement in IADLs. MET  3. Increased B knee  MMT 1/2  to increase tolerance for ADL and work activities. MET  4. Pt to report an ability to perform stand pivot transfer into her  sofa. MET   5. Pt to tolerate HEP to improve ROM and independence with ADL's. MET  6. New 1/15/2020: pt will tolerate 6 minute walk test to demonstrate improved gait tolerance.- 2 min walk with RW= 228 ft.      Long Term Goals:  6-8 weeks, updated 1/15/2020  1. Report decreased in pain at worse less than  <   / =  2  /10  to increase tolerance for functional mobility. ongoing  2. Pt to improve B knee range of motion by 8 degrees to allow for improved functional mobility to allow for improvement in IADLs.  MET  3. Increased B knee MMT 1 grade  to increase tolerance for ADL and work activities. MET  4. Pt will scores report at CJ level (20-40% impaired) on LEFS  to demonstrate increase in LE function with every day tasks. progressing  5. Pt to be Independent with HEP to improve ROM and independence with ADL's. MET  6. New goal 8/14/2019: Pt will decrease score on TUG to </= 30 seconds in order to make her ambulation more functional. met 12/18/19              Revised 12/18/19:  pt will perform TUG in <20 sec to demonstrate decreased fall risk and improvement in mobility for household distance to decrease w/c use. Met 1/15/20  7. New 12/19/19: pt will demonstrate improved general strength for being able to stand from various surfaces by performing 9 sit<>stands from chair without armrests.- improved quality, still needs armrests  8. New 12/19/19: pt will perform static standing with intermittent UE support for 5 min to improve ability to perform some house hold duties (I.e. wiping a counter)- improved ~15 sec without UE support, SBA         Plan   Add more standing exercises/ balance activities as tolerated       Vaibhav Tejada,PTA  2/3/2020

## 2020-02-12 ENCOUNTER — DOCUMENTATION ONLY (OUTPATIENT)
Dept: REHABILITATION | Facility: HOSPITAL | Age: 60
End: 2020-02-12

## 2020-02-12 NOTE — PROGRESS NOTES
Missed Visit/Cancellation      Date: 2/12/2020         Canceled Number: 1  No Show Number: 0                                                                                                                Pt initially had visit scheduled for today for 0815.   Reason for cancellation: awaiting insurance auth.    Pt's next scheduled physical therapy visit is 12/17/2020.    Yelena Ferrer,SHAHIDT  2/12/2020

## 2020-02-17 ENCOUNTER — CLINICAL SUPPORT (OUTPATIENT)
Dept: REHABILITATION | Facility: HOSPITAL | Age: 60
End: 2020-02-17
Attending: NEUROLOGICAL SURGERY
Payer: MEDICARE

## 2020-02-17 DIAGNOSIS — G89.29 BILATERAL CHRONIC KNEE PAIN: ICD-10-CM

## 2020-02-17 DIAGNOSIS — M79.671 BILATERAL FOOT PAIN: ICD-10-CM

## 2020-02-17 DIAGNOSIS — R29.898 WEAKNESS OF BOTH LOWER EXTREMITIES: ICD-10-CM

## 2020-02-17 DIAGNOSIS — M79.672 BILATERAL FOOT PAIN: ICD-10-CM

## 2020-02-17 DIAGNOSIS — M25.562 BILATERAL CHRONIC KNEE PAIN: ICD-10-CM

## 2020-02-17 DIAGNOSIS — M25.561 BILATERAL CHRONIC KNEE PAIN: ICD-10-CM

## 2020-02-17 DIAGNOSIS — R26.81 UNSTEADY GAIT: ICD-10-CM

## 2020-02-17 PROCEDURE — 97116 GAIT TRAINING THERAPY: CPT | Mod: PN,CQ

## 2020-02-17 PROCEDURE — 97110 THERAPEUTIC EXERCISES: CPT | Mod: PN,CQ

## 2020-02-17 NOTE — PROGRESS NOTES
"  Physical Therapy Daily Treatment Note      Name: Kylie Aleman  Clinic Number: [id number]     Therapy Diagnosis:           Encounter Diagnoses   Name Primary?    Bilateral chronic knee pain      Bilateral foot pain Yes    Unsteady gait      Weakness of lower extremity, unspecified laterality        Physician: Mannie Hutchison MD     Visit Date: 2/17/2020      Physician Orders: PT Eval and Treat  Medical Diagnosis: pain in both lower extremities, gait instability  Evaluation Date: 5/9/19  Authorization Period Expiration: 10/20/2020  POC: 12/10/2019 to 1/31/2019  New POC: 2/1/2020 to 3/13/2020    Visit #/Visits authorized: 25/36   (Total Visits 2019: 43)     Time In: 0815  Time Out: 0900  Total Billable Time: 45minutes   Total treatment time: 45 minutes     Precautions: Standard     Subjective      Pt reports: Same pains in feet, hands, knees.   She was compliant with home exercise program.   Response to previous treatment: none  Functional change: progressing towards goals.      Pain: 3/10   Location: B Knees, feet     Objective      BOLD = new exercise or exercise progression     Pt arrives with RW from parking lot>clinic, then used manual wc into gym. Pt rolls self into and out of gym.     Kylie received therapeutic exercises to develop strength and endurance for 38 minutes including:      SciFit StepOne using hills on sprint setting 5.5, for 10 minutes to increase cardiovascular endurance. Rest break required after performed       sitting (freemotion): lat pull down 10# ea UE 3 x 15   Wood chop high to low 10# 3x10             Tricep/elbow extension 7# 3x15 each    Standing: -  Hip abduction 3 x 10, BUE support 1#  Heel raises 3 x 10, BUE support 1#  Marches 3x10 1#     Ambulated with RW approximately 210 ft x 3 (average time 1'30") mod I - sitting rest following each trial. Pt with flexed posture and increased weight distribution through BUEs.      Parallel bars: static standing x 3 trials, attempted 30 " sec intervals, intermittent UE support, SBA- 21 sec + 26 sec + 26 sec-NP    3 laps side stepping with 1 UE support -NP    Pt exited session using manual wc.       Home Exercises Provided and Patient Education Provided      Education provided:   - continue with HEP  - reviewed progressions made during session (I.e. Increased resistance with UE strengthening)     Written Home Exercises Provided: Patient instructed to cont prior HEP.  Exercises were reviewed and Kylie was able to demonstrate them prior to the end of the session.  [place] demonstrated good  understanding of the education provided.      See EMR under Patient Instructions for exercises provided prior visit.     Assessment   Kylie tolerated today's session well.  Pt has not been to therapy  In 2/3/2020 secondary to insurance issues. Pt with noted fatigue with gait and standing exercises. Pt stated she has not been doing exercises or walking at home during her time off. Pt ambulated with good posture and gait speed today.   Pt prognosis is Fair.   Pt is progressing towards goals.      Pt will continue to benefit from skilled outpatient physical therapy to address the deficits listed in the problem list box on initial evaluation, provide pt/family education and to maximize pt's level of independence in the home and community environment.      Pt's spiritual, cultural and educational needs considered and pt agreeable to plan of care and goals.     Anticipated barriers to physical therapy: LE weakness     Goals:   Short Term Goals:  2-3 weeks; updated 1/15/2020  1.Report decreased in pain at worst less than <   / =  4  /10  to increase tolerance for functional activities. MET previously  2. Pt to improve B knee range of motion by 8 degrees to allow for improved functional mobility to allow for improvement in IADLs. MET  3. Increased B knee  MMT 1/2  to increase tolerance for ADL and work activities. MET  4. Pt to report an ability to perform stand pivot  transfer into her sofa. MET   5. Pt to tolerate HEP to improve ROM and independence with ADL's. MET  6. New 1/15/2020: pt will tolerate 6 minute walk test to demonstrate improved gait tolerance.- 2 min walk with RW= 228 ft.      Long Term Goals:  6-8 weeks, updated 1/15/2020  1. Report decreased in pain at worse less than  <   / =  2  /10  to increase tolerance for functional mobility. ongoing  2. Pt to improve B knee range of motion by 8 degrees to allow for improved functional mobility to allow for improvement in IADLs.  MET  3. Increased B knee MMT 1 grade  to increase tolerance for ADL and work activities. MET  4. Pt will scores report at CJ level (20-40% impaired) on LEFS  to demonstrate increase in LE function with every day tasks. progressing  5. Pt to be Independent with HEP to improve ROM and independence with ADL's. MET  6. New goal 8/14/2019: Pt will decrease score on TUG to </= 30 seconds in order to make her ambulation more functional. met 12/18/19              Revised 12/18/19:  pt will perform TUG in <20 sec to demonstrate decreased fall risk and improvement in mobility for household distance to decrease w/c use. Met 1/15/20  7. New 12/19/19: pt will demonstrate improved general strength for being able to stand from various surfaces by performing 9 sit<>stands from chair without armrests.- improved quality, still needs armrests  8. New 12/19/19: pt will perform static standing with intermittent UE support for 5 min to improve ability to perform some house hold duties (I.e. wiping a counter)- improved ~15 sec without UE support, SBA         Plan   Add more standing exercises/ balance activities as tolerated       Vaibhav Tejada,PTA  2/17/2020

## 2020-02-19 ENCOUNTER — CLINICAL SUPPORT (OUTPATIENT)
Dept: REHABILITATION | Facility: HOSPITAL | Age: 60
End: 2020-02-19
Attending: NEUROLOGICAL SURGERY
Payer: MEDICARE

## 2020-02-19 DIAGNOSIS — M79.671 BILATERAL FOOT PAIN: ICD-10-CM

## 2020-02-19 DIAGNOSIS — R29.898 WEAKNESS OF BOTH LOWER EXTREMITIES: ICD-10-CM

## 2020-02-19 DIAGNOSIS — M25.561 BILATERAL CHRONIC KNEE PAIN: ICD-10-CM

## 2020-02-19 DIAGNOSIS — R26.81 UNSTEADY GAIT: ICD-10-CM

## 2020-02-19 DIAGNOSIS — G89.29 BILATERAL CHRONIC KNEE PAIN: ICD-10-CM

## 2020-02-19 DIAGNOSIS — M79.672 BILATERAL FOOT PAIN: ICD-10-CM

## 2020-02-19 DIAGNOSIS — M25.562 BILATERAL CHRONIC KNEE PAIN: ICD-10-CM

## 2020-02-19 PROCEDURE — 97110 THERAPEUTIC EXERCISES: CPT | Mod: PN | Performed by: PHYSICAL THERAPIST

## 2020-02-24 NOTE — PLAN OF CARE
Physical Therapy Daily Treatment Note      Name: Kylie Aleman  Clinic Number: [id number]     Therapy Diagnosis:           Encounter Diagnoses   Name Primary?    Bilateral chronic knee pain      Bilateral foot pain Yes    Unsteady gait      Weakness of lower extremity, unspecified laterality        Physician: Mannie Hutchison MD     Visit Date: 2/19/2020      Physician Orders: PT Eval and Treat  Medical Diagnosis: pain in both lower extremities, gait instability  Evaluation Date: 5/9/19  Authorization Period Expiration: 3/14/2020  POC: 2/1/2020 to 3/13/2020    Visit #/Visits authorized: 3/6   (Total Visits 2019: 43)     Time In: 0815  Time Out: 0900  Total Billable Time: 45 minutes      Precautions: Standard     Subjective      Pt reports: my feet are really bothering me today  She was not compliant with home exercise program. Pt reports she walked in the grocery store with cart for ~10 min, then had to use a scooter  Response to previous treatment: none  Functional change: progressing towards goals.      Pain: 5/10   Location: B Knees, feet     Objective      BOLD = new exercise or exercise progression     Pt used w/c to get from parking lot into PT gym today     Kylie received therapeutic exercises to develop strength and endurance for 45 minutes including:      SciFit StepOne using hills on sprint setting 5.5, for 10 minutes to increase cardiovascular endurance. Rest break required after performed       sitting (freemotion): lat pull down 10# ea UE x 15, 13# 2 x 10   Wood chop high to low 10# 3x15             Tricep/elbow extension 10# 3x10 each    Standing: -  Marches 3x10 1#  D2 single UE flexion 20x= fair balance      12/18/19 1/15/20 2/19/20   TUG 28.5 sec w/ RW 19 sec w/RW 21.5 sec w/RW   30 sec sit<>stand 14x with arms on armrests 11x arms on armrests 13x arms on armrests (w/c)   6 min walk NT 2 min tolerated with RW= 228 ft 2 min walk with RW= 188 ft   TUG norms: < 20 sec safe for independent  "transfers, < 30 sec safe for dependent transfers/assist required    TUG with RW= 23.9 sec + 20 sec + 20.6 sec    2 laps in 1'44"= 188 ft    Ambulated with RW approximately 188 ft x 2 mod I - sitting rest following each trial. Pt with flexed posture and increased weight distribution through BUEs.      Parallel bars: static standing x 3 trials, attempted 30 sec intervals, intermittent UE support, close S- 16 sec max (pt loosing balance forwards)      Pt exited session using manual wc.       Home Exercises Provided and Patient Education Provided      Education provided:   - continue with HEP  - reviewed progressions made during session (I.e. Increased resistance with UE strengthening)     Written Home Exercises Provided: Patient instructed to cont prior HEP.  Exercises were reviewed and Kylie was able to demonstrate them prior to the end of the session.  [place] demonstrated good  understanding of the education provided.      See EMR under Patient Instructions for exercises provided prior visit.     Assessment   assessment period: 1/22/2020 t 2/19/2020  Kylie tolerates PT sessions well.  Pt demonstrates intermittent use of w/c to attend PT sessions. Per pt report, she continues to attempt to increase ambulation outside of PT sessions. Pt reported that she was able to walk for ~10 minutes in the grocery store with use of shopping cart, then she had to use a scooter for the remainder of her shopping. During PT sessions, pt's tolerance to ambulation with RW no longer than 2 minutes. Pt demonstrates improved LE strength by improved quality and repetitions for 30 second sit<>stand test. Pt continues to require use of armrests for success. Pt's gait and standing is limited by decreased strength and pain of knees and feet. Pt continues to stand and ambulate with increased flexion at hips and increase weight bearing on UE. UE and back extension strengthening exercises are progressed as pt tolerates. Pt continues to " demonstrate poor trunk stability in standing without UE support. Pt can benefit from continued skilled PT to improve gait tolerance and strength to decrease need for w/c use for mobility to improve her daily mobility and ability to perform household duties.      Pt prognosis is Fair.   Pt is progressing towards goals.      Pt will continue to benefit from skilled outpatient physical therapy to address the deficits listed in the problem list box on initial evaluation, provide pt/family education and to maximize pt's level of independence in the home and community environment.      Pt's spiritual, cultural and educational needs considered and pt agreeable to plan of care and goals.     Anticipated barriers to physical therapy: LE weakness     Goals: formal status as of 2/19/2020  Short Term Goals:  2-3 weeks;  1.Report decreased in pain at worst less than <   / =  4  /10  to increase tolerance for functional activities. MET previously  2. Pt to improve B knee range of motion by 8 degrees to allow for improved functional mobility to allow for improvement in IADLs. MET  3. Increased B knee  MMT 1/2  to increase tolerance for ADL and work activities. MET  4. Pt to report an ability to perform stand pivot transfer into her sofa. MET   5. Pt to tolerate HEP to improve ROM and independence with ADL's. MET  6. New 1/15/2020: pt will tolerate 6 minute walk test to demonstrate improved gait tolerance.- 2 min walk with RW ~same= 188 ft.      Long Term Goals:  6-8 weeks,   1. Report decreased in pain at worse less than  <   / =  2  /10  to increase tolerance for functional mobility. ongoing  2. Pt to improve B knee range of motion by 8 degrees to allow for improved functional mobility to allow for improvement in IADLs.  MET  3. Increased B knee MMT 1 grade  to increase tolerance for ADL and work activities. MET  4. Pt will scores report at CJ level (20-40% impaired) on LEFS  to demonstrate increase in LE function with every  day tasks. progressing  5. Pt to be Independent with HEP to improve ROM and independence with ADL's. MET  6. New goal 8/14/2019: Pt will decrease score on TUG to </= 30 seconds in order to make her ambulation more functional. met 12/18/19              Revised 12/18/19:  pt will perform TUG in <20 sec to demonstrate decreased fall risk and improvement in mobility for household distance to decrease w/c use. Met 1/15/20  7. New 12/19/19: pt will demonstrate improved general strength for being able to stand from various surfaces by performing 9 sit<>stands from chair without armrests.- improved quality, still needs armrests  8. New 12/19/19: pt will perform static standing with intermittent UE support for 5 min to improve ability to perform some house hold duties (I.e. wiping a counter)- ~same ~15 sec without UE support, SBA         Plan   Increase resistance for D2 flexion in standing       Yelena Ferrer,PT  2/19/2020

## 2020-03-02 ENCOUNTER — DOCUMENTATION ONLY (OUTPATIENT)
Dept: REHABILITATION | Facility: HOSPITAL | Age: 60
End: 2020-03-02

## 2020-03-02 NOTE — PROGRESS NOTES
I, Yelena Ferrer, DPT, met with Vaibhav Tejada PTA to discuss this pt's POC.  Improve core strength to improve standing tolerance/ posture. May begin strengthening on mat. Continue to attempt to improve gait tolerance.     Yelena Ferrer,PT, DPT  Vaibhav Tejada PTA  3/2/2020

## 2020-03-04 ENCOUNTER — CLINICAL SUPPORT (OUTPATIENT)
Dept: REHABILITATION | Facility: HOSPITAL | Age: 60
End: 2020-03-04
Attending: NEUROLOGICAL SURGERY
Payer: MEDICARE

## 2020-03-04 DIAGNOSIS — G89.29 BILATERAL CHRONIC KNEE PAIN: ICD-10-CM

## 2020-03-04 DIAGNOSIS — M25.562 BILATERAL CHRONIC KNEE PAIN: ICD-10-CM

## 2020-03-04 DIAGNOSIS — M25.561 BILATERAL CHRONIC KNEE PAIN: ICD-10-CM

## 2020-03-04 DIAGNOSIS — R26.81 UNSTEADY GAIT: ICD-10-CM

## 2020-03-04 DIAGNOSIS — M79.672 BILATERAL FOOT PAIN: ICD-10-CM

## 2020-03-04 DIAGNOSIS — M79.671 BILATERAL FOOT PAIN: ICD-10-CM

## 2020-03-04 DIAGNOSIS — R29.898 WEAKNESS OF BOTH LOWER EXTREMITIES: ICD-10-CM

## 2020-03-04 PROCEDURE — 97110 THERAPEUTIC EXERCISES: CPT | Mod: PN | Performed by: PHYSICAL THERAPIST

## 2020-03-04 NOTE — PROGRESS NOTES
Physical Therapy Daily Treatment Note      Name: Kylie Aleman  Clinic Number: [id number]     Therapy Diagnosis:           Encounter Diagnoses   Name Primary?    Bilateral chronic knee pain      Bilateral foot pain Yes    Unsteady gait      Weakness of lower extremity, unspecified laterality        Physician: Mannie Hutchison MD     Visit Date: 3/4/2020      Physician Orders: PT Eval and Treat  Medical Diagnosis: pain in both lower extremities, gait instability  Evaluation Date: 5/9/19  Authorization Period Expiration: 3/14/2020  POC: 2/1/2020 to 3/13/2020     Visit #/Visits authorized: 4/6   (Total Visits 2019: 43)     Time In: 0819  Time Out: 0905  Total Billable Time: 45 minutes      Precautions: Standard     Subjective      Pt reports: my knees are really hurting, it's going to rain  She was partially compliant with home exercise program. Practiced mainly walking, mopping, cooking (with intermittent standing)  Response to previous treatment: no adverse effects reported  Functional change: progressing towards goals.      Pain: 4/10   Location: B Knees, feet     Objective      BOLD = new exercise or exercise progression     Pt used w/c to get from parking lot into PT gym today     Kylie received therapeutic exercises to develop strength and endurance for 45 minutes including:      SciFit StepOne using hills on sprint setting 5.5, for 10 minutes to increase cardiovascular endurance. Rest break required after performed      Supine: pelvic tilts 3 sec 10x   Pelvic tilts with marching 10x   Bridging 2 x 10     sitting (freemotion): lat pull down 10# ea UE x 15, 13# 2 x 10              Wood chop high to low 10# 3x15             Tricep/elbow extension 10# 3x10 each      Ambulated with RW approximately 180 ft + 85 ft mod I - sitting rest following each trial. Pt with flexed posture and increased weight distribution through BUEs.                  Pt exited session using manual wc.       Home Exercises Provided and  Patient Education Provided      Education provided:   - continue with HEP     Written Home Exercises Provided: Patient instructed to cont prior HEP.  Exercises were reviewed and Kylie was able to demonstrate them prior to the end of the session.  Kylie demonstrated good  understanding of the education provided.      See EMR under Patient Instructions for exercises provided prior visit.     Assessment   Kylie tolerated session fairly. Gait distance was limited by ECTOR foot and knee pain. PT performed supine exercises due to limited standing tolerance today and to address pt's postual stability in standing with decreased UE supportPt can benefit from continued skilled PT to improve gait tolerance and strength to decrease need for w/c use for mobility to improve her daily mobility and ability to perform household duties.       Pt prognosis is Fair.   Pt is progressing towards goals.       Pt will continue to benefit from skilled outpatient physical therapy to address the deficits listed in the problem list box on initial evaluation, provide pt/family education and to maximize pt's level of independence in the home and community environment.      Pt's spiritual, cultural and educational needs considered and pt agreeable to plan of care and goals.     Anticipated barriers to physical therapy: LE weakness     Goals: formal status as of 2/19/2020  Short Term Goals:  2-3 weeks;  1.Report decreased in pain at worst less than <   / =  4  /10  to increase tolerance for functional activities. MET previously  2. Pt to improve B knee range of motion by 8 degrees to allow for improved functional mobility to allow for improvement in IADLs. MET  3. Increased B knee  MMT 1/2  to increase tolerance for ADL and work activities. MET  4. Pt to report an ability to perform stand pivot transfer into her sofa. MET   5. Pt to tolerate HEP to improve ROM and independence with ADL's. MET  6. New 1/15/2020: pt will tolerate 6 minute walk  test to demonstrate improved gait tolerance.- 2 min walk with RW ~same= 188 ft.      Long Term Goals:  6-8 weeks,   1. Report decreased in pain at worse less than  <   / =  2  /10  to increase tolerance for functional mobility. ongoing  2. Pt to improve B knee range of motion by 8 degrees to allow for improved functional mobility to allow for improvement in IADLs.  MET  3. Increased B knee MMT 1 grade  to increase tolerance for ADL and work activities. MET  4. Pt will scores report at CJ level (20-40% impaired) on LEFS  to demonstrate increase in LE function with every day tasks. progressing  5. Pt to be Independent with HEP to improve ROM and independence with ADL's. MET  6. New goal 8/14/2019: Pt will decrease score on TUG to </= 30 seconds in order to make her ambulation more functional. met 12/18/19              Revised 12/18/19:  pt will perform TUG in <20 sec to demonstrate decreased fall risk and improvement in mobility for household distance to decrease w/c use. Met 1/15/20  7. New 12/19/19: pt will demonstrate improved general strength for being able to stand from various surfaces by performing 9 sit<>stands from chair without armrests.- improved quality, still needs armrests  8. New 12/19/19: pt will perform static standing with intermittent UE support for 5 min to improve ability to perform some house hold duties (I.e. wiping a counter)- ~same ~15 sec without UE support, SBA         Plan   Increase resistance for D2 flexion in standing   Alternate standing exercises with supine core stability      Yelena Ferrer,PT  3/4/2020

## 2020-03-09 ENCOUNTER — CLINICAL SUPPORT (OUTPATIENT)
Dept: REHABILITATION | Facility: HOSPITAL | Age: 60
End: 2020-03-09
Attending: NEUROLOGICAL SURGERY
Payer: MEDICARE

## 2020-03-09 DIAGNOSIS — G89.29 BILATERAL CHRONIC KNEE PAIN: ICD-10-CM

## 2020-03-09 DIAGNOSIS — M79.671 BILATERAL FOOT PAIN: ICD-10-CM

## 2020-03-09 DIAGNOSIS — M25.561 BILATERAL CHRONIC KNEE PAIN: ICD-10-CM

## 2020-03-09 DIAGNOSIS — M79.672 BILATERAL FOOT PAIN: ICD-10-CM

## 2020-03-09 DIAGNOSIS — R29.898 WEAKNESS OF BOTH LOWER EXTREMITIES: ICD-10-CM

## 2020-03-09 DIAGNOSIS — M25.562 BILATERAL CHRONIC KNEE PAIN: ICD-10-CM

## 2020-03-09 DIAGNOSIS — R26.81 UNSTEADY GAIT: ICD-10-CM

## 2020-03-09 PROCEDURE — 97116 GAIT TRAINING THERAPY: CPT | Mod: PN,CQ

## 2020-03-09 PROCEDURE — 97110 THERAPEUTIC EXERCISES: CPT | Mod: PN,CQ

## 2020-03-11 ENCOUNTER — CLINICAL SUPPORT (OUTPATIENT)
Dept: REHABILITATION | Facility: HOSPITAL | Age: 60
End: 2020-03-11
Attending: NEUROLOGICAL SURGERY
Payer: MEDICARE

## 2020-03-11 DIAGNOSIS — M25.562 BILATERAL CHRONIC KNEE PAIN: ICD-10-CM

## 2020-03-11 DIAGNOSIS — M79.672 BILATERAL FOOT PAIN: ICD-10-CM

## 2020-03-11 DIAGNOSIS — R29.898 WEAKNESS OF BOTH LOWER EXTREMITIES: ICD-10-CM

## 2020-03-11 DIAGNOSIS — G89.29 BILATERAL CHRONIC KNEE PAIN: ICD-10-CM

## 2020-03-11 DIAGNOSIS — M25.561 BILATERAL CHRONIC KNEE PAIN: ICD-10-CM

## 2020-03-11 DIAGNOSIS — R26.81 UNSTEADY GAIT: ICD-10-CM

## 2020-03-11 DIAGNOSIS — M79.671 BILATERAL FOOT PAIN: ICD-10-CM

## 2020-03-11 PROCEDURE — 97110 THERAPEUTIC EXERCISES: CPT | Mod: PN,CQ

## 2020-03-11 NOTE — PROGRESS NOTES
Physical Therapy Daily Treatment Note      Name: Kylie Aleman  Clinic Number: [id number]     Therapy Diagnosis:           Encounter Diagnoses   Name Primary?    Bilateral chronic knee pain      Bilateral foot pain Yes    Unsteady gait      Weakness of lower extremity, unspecified laterality        Physician: Mannie Hutchison MD     Visit Date: 3/11/2020      Physician Orders: PT Eval and Treat  Medical Diagnosis: pain in both lower extremities, gait instability  Evaluation Date: 5/9/19  Authorization Period Expiration: 3/14/2020  POC: 2/1/2020 to 3/13/2020     Visit #/Visits authorized: 6/6   (Total Visits 2019: 43)     Time In: 0825 (10 mins late)  Time Out: 905  Total Billable Time: 35 minutes      Precautions: Standard     Subjective      Pt reports:knees and the feet hurting as usual   She was partially compliant with home exercise program. Practiced mainly walking, mopping, cooking (with intermittent standing)  Response to previous treatment: no adverse effects reported  Functional change: progressing towards goals.      Pain: 4/10   Location: B Knees, feet     Objective      BOLD = new exercise or exercise progression     Pt used w/c to get from parking lot into PT gym today     Kylie received therapeutic exercises to develop strength and endurance for 45 minutes including:      SciFit StepOne using hills on sprint setting 5.5, for 10 minutes to increase cardiovascular endurance. Rest break required after performed      Supine: pelvic tilts 3 sec 20x-NP   Pelvic tilts with marching 20x-NP   Bridging 2 x 10-NP     sitting (freemotion): lat pull down 10# ea UE x 15, 13# 2 x 10              Wood chop high to low 10# 3x15             Tricep/elbow extension 10# 3x10 each     Standing: Marches with RW 2x40 Aj     Ambulated with RW approximately 180 ft + 130 ft mod I - sitting rest following each trial. Pt with flexed posture and increased weight distribution through BUEs.                  Pt exited session  using manual wc.       Home Exercises Provided and Patient Education Provided      Education provided:   - continue with HEP     Written Home Exercises Provided: Patient instructed to cont prior HEP.  Exercises were reviewed and Kylie was able to demonstrate them prior to the end of the session.  [place] demonstrated good  understanding of the education provided.      See EMR under Patient Instructions for exercises provided prior visit.     Assessment   Kylie tolerated session well. Pt entered clinic in WC. Pt able to ambulate 4 laps today (2 and 2) at end of session. Pt requires VCs and demonstration to be able to perform freemotion exercises correctly.       Pt prognosis is Fair.   Pt is progressing towards goals.       Pt will continue to benefit from skilled outpatient physical therapy to address the deficits listed in the problem list box on initial evaluation, provide pt/family education and to maximize pt's level of independence in the home and community environment.      Pt's spiritual, cultural and educational needs considered and pt agreeable to plan of care and goals.     Anticipated barriers to physical therapy: LE weakness     Goals: formal status as of 2/19/2020  Short Term Goals:  2-3 weeks;  1.Report decreased in pain at worst less than <   / =  4  /10  to increase tolerance for functional activities. MET previously  2. Pt to improve B knee range of motion by 8 degrees to allow for improved functional mobility to allow for improvement in IADLs. MET  3. Increased B knee  MMT 1/2  to increase tolerance for ADL and work activities. MET  4. Pt to report an ability to perform stand pivot transfer into her sofa. MET   5. Pt to tolerate HEP to improve ROM and independence with ADL's. MET  6. New 1/15/2020: pt will tolerate 6 minute walk test to demonstrate improved gait tolerance.- 2 min walk with RW ~same= 188 ft.      Long Term Goals:  6-8 weeks,   1. Report decreased in pain at worse less than   <   / =  2  /10  to increase tolerance for functional mobility. ongoing  2. Pt to improve B knee range of motion by 8 degrees to allow for improved functional mobility to allow for improvement in IADLs.  MET  3. Increased B knee MMT 1 grade  to increase tolerance for ADL and work activities. MET  4. Pt will scores report at CJ level (20-40% impaired) on LEFS  to demonstrate increase in LE function with every day tasks. progressing  5. Pt to be Independent with HEP to improve ROM and independence with ADL's. MET  6. New goal 8/14/2019: Pt will decrease score on TUG to </= 30 seconds in order to make her ambulation more functional. met 12/18/19              Revised 12/18/19:  pt will perform TUG in <20 sec to demonstrate decreased fall risk and improvement in mobility for household distance to decrease w/c use. Met 1/15/20  7. New 12/19/19: pt will demonstrate improved general strength for being able to stand from various surfaces by performing 9 sit<>stands from chair without armrests.- improved quality, still needs armrests  8. New 12/19/19: pt will perform static standing with intermittent UE support for 5 min to improve ability to perform some house hold duties (I.e. wiping a counter)- ~same ~15 sec without UE support, SBA         Plan   Increase resistance for D2 flexion in standing   Alternate standing exercises with supine core stability      Vaibhav Tejaad, PTA  3/11/2020

## 2020-03-19 ENCOUNTER — TELEPHONE (OUTPATIENT)
Dept: REHABILITATION | Facility: HOSPITAL | Age: 60
End: 2020-03-19

## 2020-03-19 NOTE — TELEPHONE ENCOUNTER
Patient: Kylie Aleman  Date: 3/19/2020  Diagnosis: No diagnosis found.  MRN: [id number]    Spoke with patient due to therapy following updates regarding COVID-19 closely and taking every precaution to ensure the safety of our patients, staff and community.  In an abundance of caution and in an effort to help reduce risk and limit community spread, we have decided to temporarily postpone appointments for patients who may be at increased risk to attend in-person therapy or where therapy is not critically needed at this time. Plan of care and home exercise program were reviewed and patient has what they need to continue therapy at home. All patient questions were answered. Also stated to patient that we are exploring virtual methods of providing care and will be in touch over the next few weeks. Patient verbalized understanding to all.    Vaibhav Tejada, PTA  3/19/2020

## 2020-04-28 ENCOUNTER — DOCUMENTATION ONLY (OUTPATIENT)
Dept: REHABILITATION | Facility: HOSPITAL | Age: 60
End: 2020-04-28

## 2020-04-28 NOTE — PROGRESS NOTES
Spoke with pt about coming back into the clinic to be reassessed and starting PT visits again. Pt verbalized that she is ready to begin with PT again. Pt informed that we are taking all necessary precautions with regards to COVID19.    Vaibhav Tejada, PTA

## 2020-05-06 ENCOUNTER — DOCUMENTATION ONLY (OUTPATIENT)
Dept: REHABILITATION | Facility: HOSPITAL | Age: 60
End: 2020-05-06

## 2020-05-06 ENCOUNTER — TELEPHONE (OUTPATIENT)
Dept: REHABILITATION | Facility: HOSPITAL | Age: 60
End: 2020-05-06

## 2020-05-06 NOTE — PROGRESS NOTES
Missed Visit/Cancellation      Date:5/6/2020         Canceled Number: 0  No Show Number: 1                                                                                                                Pt initially had visit scheduled for today for 1000.   Reason for cancellation: no show.    Pt's next scheduled physical therapy visit has not been rescheduled.    Yelena Ferrer,DPT  5/6/2020

## 2020-05-18 ENCOUNTER — CLINICAL SUPPORT (OUTPATIENT)
Dept: REHABILITATION | Facility: HOSPITAL | Age: 60
End: 2020-05-18
Attending: NEUROLOGICAL SURGERY
Payer: MEDICARE

## 2020-05-18 DIAGNOSIS — M79.671 BILATERAL FOOT PAIN: ICD-10-CM

## 2020-05-18 DIAGNOSIS — G89.29 BILATERAL CHRONIC KNEE PAIN: ICD-10-CM

## 2020-05-18 DIAGNOSIS — R26.81 UNSTEADY GAIT: ICD-10-CM

## 2020-05-18 DIAGNOSIS — M25.562 BILATERAL CHRONIC KNEE PAIN: ICD-10-CM

## 2020-05-18 DIAGNOSIS — R29.898 WEAKNESS OF BOTH LOWER EXTREMITIES: ICD-10-CM

## 2020-05-18 DIAGNOSIS — M79.672 BILATERAL FOOT PAIN: ICD-10-CM

## 2020-05-18 DIAGNOSIS — M25.561 BILATERAL CHRONIC KNEE PAIN: ICD-10-CM

## 2020-05-18 PROCEDURE — 97110 THERAPEUTIC EXERCISES: CPT | Mod: PN | Performed by: PHYSICAL THERAPIST

## 2020-05-18 NOTE — PLAN OF CARE
Physical Therapy Daily Treatment Note      Name: Kylie Aleman  Clinic Number: [id number]     Therapy Diagnosis:           Encounter Diagnoses   Name Primary?    Bilateral chronic knee pain      Bilateral foot pain Yes    Unsteady gait      Weakness of lower extremity, unspecified laterality        Physician: Mannie Hutchison MD     Visit Date: 5/18/2020      Physician Orders: PT Eval and Treat  Medical Diagnosis: M79.604 (ICD-10-CM) - Pain in right leg M79.605 (ICD-10-CM) - Pain in left leg R26.81 (ICD-10-CM) - Unsteadiness on feet    Evaluation Date: 5/9/19  Authorization Period Expiration: 3/11/2021  POC: 2/1/2020 to 3/13/2020   New POC: 5/18/2020 to 7/17/2020     Visit #/Visits authorized: 1/2  (Total Visits 2019: 43)     Time In: 0940  Time Out: 1014  Total Billable Time: 34 minutes      Precautions: Standard     Subjective      Pt reports: I have not been as motivated since I have not been in therapy. Pt arrived ~10 min late to session  She was not compliant with home exercise program for strengthening. Minimal walking at home (<daily)  Response to previous treatment: no adverse effects reported  Functional change: ongoing     Pain: 6/10 at beginning of session, 5/10 at conclusion of session  Location: B Knees, hands   Objective      BOLD = new exercise or exercise progression     Pt used w/c to get from parking lot into PT gym today     Kylie received therapeutic exercises to develop strength and endurance for 34 minutes including:       RLE 5/18/20 LLE 5/18/20   Hip Flexion: 4+/5 5/5   Hip Extension:  NT NT   Hip Abduction: NT NT   Hip Adduction: NT NT   Knee Extension: 4+/5 5/5   Knee Flexion: 5/5 5/5   Ankle Dorsiflexion: 4/5 4+/5   Ankle Plantarflexion: 3+/5 4-/5           12/18/19 1/15/20 2/19/20 5/18/2020   TUG 28.5 sec w/ RW 19 sec w/RW 21.5 sec w/RW 21.1sec with    30 sec sit<>stand 14x with arms on armrests 11x arms on armrests 13x arms on armrests (w/c) 11x arms on armrests   6 min walk NT 2  "min tolerated with RW= 228 ft 2 min walk with RW= 188 ft 45 sec tolerated w/RW= 88ft   TUG norms: < 20 sec safe for independent transfers, < 30 sec safe for dependent transfers/assist required     TUG with RW= 23.4 sec + 19.8 sec + 20.1 sec     1 laps in 45" x 2 trials= 88 ft- unable to tolerate 2 min    Static standing without UE support= 4 sec max    Parallel bars: heel raises 2 x 10   Side stepping 1 lap + 3 laps, BUE support   Hip extension 2 x 10     Pt exited session using manual wc.       Home Exercises Provided and Patient Education Provided      Education provided:   - encouraged increased walking when at home (I.e. Walk laps at home vs. Room to room)  - reviewed results of assessment     Written Home Exercises Provided: Patient instructed to cont prior HEP.  Exercises were reviewed and [place] was able to demonstrate them prior to the end of the session.  [place] demonstrated good  understanding of the education provided.      See EMR under Patient Instructions for exercises provided prior visit.     Assessment   Assessment period: 2/19/2020 to 5/18/ 2020. Pt participated in 4 sessions including today.    Pt is returning to PT clinic today- pt was placed on hold due to an abundance of caution and concern over the spread of COVID-19. Pt did not have the equipment needed to perform virtual PT sessions. Pt reports poor compliance with HEP/ gait while she was on hold from PT. [place] tolerated today's treatment fairly. Pt noted to have a significant decline in endurance for participation in physical activity/ exercise. Pt required multiple sitting rest periods during today's reassessment and was unable to tolerate gait for >1 min at a time. Pt reported increase foot and hand pain as compared to last attended visit.  pt reported that this pain improved with increased activity throughout the PT session. A decrease in activity tolerance was noted by pt requiring more support while performing timed sit<>stand " test. Pt utilized both armrests and pressed backs of legs into chair for added stability. A decline in core strength and endurance ws noted with attempts at performing static standing balance unsupported. Tolerated duration declined by >50%. Pt stands and ambulates with a forward flexed posture and increased reliance on UE support. Pt can benefit from skilled PT services to improve activity tolerance to level of function reported in Feb 2020 and to improve ability to ambulate around her home.     Pt prognosis is Fair.   Pt is progressing towards goals.       Pt will continue to benefit from skilled outpatient physical therapy to address the deficits listed in the problem list box on initial evaluation, provide pt/family education and to maximize pt's level of independence in the home and community environment.      Pt's spiritual, cultural and educational needs considered and pt agreeable to plan of care and goals.     Anticipated barriers to physical therapy: LE weakness     Goals: formal status as of 5/18/2020   Short Term Goals:  4 weeks;  1.Report decreased in pain at worst less than <   / =  4  /10  to increase tolerance for functional activities. MET previously, no longer met (5/18/20)  2. Pt to improve B knee range of motion by 8 degrees to allow for improved functional mobility to allow for improvement in IADLs. MET  3. Increased B knee  MMT 1/2  to increase tolerance for ADL and work activities. MET  4. Pt to report an ability to perform stand pivot transfer into her sofa. MET   5. Pt to tolerate HEP to improve ROM and independence with ADL's. MET  6. New 1/15/2020: pt will tolerate 6 minute walk test to demonstrate improved gait tolerance.- 2 min walk with RW declined= 88 ft, <1 min of gait tolerated     Long Term Goals:  8 weeks,   1. Report decreased in pain at worse less than  <   / =  2  /10  to increase tolerance for functional mobility. ongoing  2. Pt to improve B knee range of motion by 8  degrees to allow for improved functional mobility to allow for improvement in IADLs.  MET  3. Increased B knee MMT 1 grade  to increase tolerance for ADL and work activities. MET  4. Pt will scores report at CJ level (20-40% impaired) on LEFS  to demonstrate increase in LE function with every day tasks. progressing  5. Pt to be Independent with HEP to improve ROM and independence with ADL's. MET  6. New goal 8/14/2019: Pt will decrease score on TUG to </= 30 seconds in order to make her ambulation more functional. met 12/18/19              Revised 12/18/19:  pt will perform TUG in <20 sec to demonstrate decreased fall risk and improvement in mobility for household distance to decrease w/c use. Met 1/15/20, no longer met 5/18/20- 21.1 sec  7. New 12/19/19: pt will demonstrate improved general strength for being able to stand from various surfaces by performing 9 sit<>stands from chair without armrests.- ~same- 11x with use of armrests, LE support on chair  8. New 12/19/19: pt will perform static standing with intermittent UE support for 5 min to improve ability to perform some house hold duties (I.e. wiping a counter)- declined ~4 sec without UE support, SBA         Plan   New POC: 5/18/2020 to 7/17/2020 2x/week to include gait training, therapeutic activity, therapeutic exercises, and modalities PRN.     Increase standing duration/ gait duration. Perform core strengthening.          Yelena Ferrer,PT  5/18/2020

## 2020-05-21 ENCOUNTER — CLINICAL SUPPORT (OUTPATIENT)
Dept: REHABILITATION | Facility: HOSPITAL | Age: 60
End: 2020-05-21
Attending: NEUROLOGICAL SURGERY
Payer: MEDICARE

## 2020-05-21 DIAGNOSIS — M79.671 BILATERAL FOOT PAIN: ICD-10-CM

## 2020-05-21 DIAGNOSIS — R29.898 WEAKNESS OF BOTH LOWER EXTREMITIES: ICD-10-CM

## 2020-05-21 DIAGNOSIS — M25.562 BILATERAL CHRONIC KNEE PAIN: ICD-10-CM

## 2020-05-21 DIAGNOSIS — R26.81 UNSTEADY GAIT: ICD-10-CM

## 2020-05-21 DIAGNOSIS — M79.672 BILATERAL FOOT PAIN: ICD-10-CM

## 2020-05-21 DIAGNOSIS — G89.29 BILATERAL CHRONIC KNEE PAIN: ICD-10-CM

## 2020-05-21 DIAGNOSIS — M25.561 BILATERAL CHRONIC KNEE PAIN: ICD-10-CM

## 2020-05-21 PROCEDURE — 97110 THERAPEUTIC EXERCISES: CPT | Mod: PN,CQ

## 2020-05-21 NOTE — PROGRESS NOTES
Physical Therapy Daily Treatment Note     Name: Kylie Aleman  Clinic Number: [id number]    Therapy Diagnosis:   Encounter Diagnoses   Name Primary?    Bilateral chronic knee pain     Bilateral foot pain     Unsteady gait     Weakness of both lower extremities      Physician: Mannie Hutchison MD    Visit Date: 5/21/2020    Physician Orders: PT Eval and Treat  Medical Diagnosis: M79.604 (ICD-10-CM) - Pain in right leg M79.605 (ICD-10-CM) - Pain in left leg R26.81 (ICD-10-CM) - Unsteadiness on feet     Evaluation Date: 5/9/19  Authorization Period Expiration: 3/11/2021  POC: 2/1/2020 to 3/13/2020   New POC: 5/18/2020 to 7/17/2020     Visit #/Visits authorized: 2/2  (Total Visits 2019: 43)     Time In: 905 (20 minutes late)  Time Out: 940  Total Billable Time: 35 minutes      Precautions: Standard      Subjective     Pt reports: She is tired and her feet are hurting a little bit.  She was compliant with home exercise program.  Response to previous treatment: sore  Functional change: ongoing 2    Pain: 5/10  Location: bilateral feet      Objective     Kylie received therapeutic exercises to develop strength and endurance for 35 minutes including:    Nustep x8 minutes lvl 2 to improve muscular and CV endurance  1 lap x 3 trials=94ft each trial      Marches 2x30 at RW     Parallel bars: heel raises 2 x 10              Side stepping 1 lap + 3 laps, BUE support-NP              Hip extension 2 x 10-NP      Kylie received the following manual therapy techniques:  were applied to the:  for  minutes, including:      Kylie participated in neuromuscular re-education activities to improve:  for  minutes. The following activities were included:        Kylie received the following direct contact modalities after being cleared for contraindications:     Kylie received the following supervised modalities after being cleared for contradictions:     Kylie received hot pack for  minutes to .    Kylie received cold  pack for  minutes to .      Home Exercises Provided and Patient Education Provided     Education provided:   - cont HEP    Written Home Exercises Provided: Patient instructed to cont prior HEP.  Exercises were reviewed and Kylie was able to demonstrate them prior to the end of the session.  Kylie demonstrated good  understanding of the education provided.     See EMR under Patient Instructions for exercises provided prior visit.    Assessment     Pt tolerated session fair today. Pt shows decreased muscle and CV endurance since time off from PT. Pt only able to ambulate 95ft before required rest break. Pt was able to ambulate further today than last visit.   Kylie is progressing well towards her goals.   Pt prognosis is Good.     Pt will continue to benefit from skilled outpatient physical therapy to address the deficits listed in the problem list box on initial evaluation, provide pt/family education and to maximize pt's level of independence in the home and community environment.     Pt's spiritual, cultural and educational needs considered and pt agreeable to plan of care and goals.     Anticipated barriers to physical therapy: none    Goals: formal status as of 5/18/2020   Short Term Goals:  4 weeks;  1.Report decreased in pain at worst less than <   / =  4  /10  to increase tolerance for functional activities. MET previously, no longer met (5/18/20)  2. Pt to improve B knee range of motion by 8 degrees to allow for improved functional mobility to allow for improvement in IADLs. MET  3. Increased B knee  MMT 1/2  to increase tolerance for ADL and work activities. MET  4. Pt to report an ability to perform stand pivot transfer into her sofa. MET   5. Pt to tolerate HEP to improve ROM and independence with ADL's. MET  6. New 1/15/2020: pt will tolerate 6 minute walk test to demonstrate improved gait tolerance.- 2 min walk with RW declined= 88 ft, <1 min of gait tolerated     Long Term Goals:  8 weeks,   1.  Report decreased in pain at worse less than  <   / =  2  /10  to increase tolerance for functional mobility. ongoing  2. Pt to improve B knee range of motion by 8 degrees to allow for improved functional mobility to allow for improvement in IADLs.  MET  3. Increased B knee MMT 1 grade  to increase tolerance for ADL and work activities. MET  4. Pt will scores report at CJ level (20-40% impaired) on LEFS  to demonstrate increase in LE function with every day tasks. progressing  5. Pt to be Independent with HEP to improve ROM and independence with ADL's. MET  6. New goal 8/14/2019: Pt will decrease score on TUG to </= 30 seconds in order to make her ambulation more functional. met 12/18/19              Revised 12/18/19:  pt will perform TUG in <20 sec to demonstrate decreased fall risk and improvement in mobility for household distance to decrease w/c use. Met 1/15/20, no longer met 5/18/20- 21.1 sec  7. New 12/19/19: pt will demonstrate improved general strength for being able to stand from various surfaces by performing 9 sit<>stands from chair without armrests.- ~same- 11x with use of armrests, LE support on chair  8. New 12/19/19: pt will perform static standing with intermittent UE support for 5 min to improve ability to perform some house hold duties (I.e. wiping a counter)- declined ~4 sec without UE support, SBA      Plan     New POC: 5/18/2020 to 7/17/2020 2x/week to include gait training, therapeutic activity, therapeutic exercises, and modalities PRN.      Increase standing duration/ gait duration. Perform core strengthening.        Vaibhav Tejada, PTA

## 2020-06-01 ENCOUNTER — CLINICAL SUPPORT (OUTPATIENT)
Dept: REHABILITATION | Facility: HOSPITAL | Age: 60
End: 2020-06-01
Attending: NEUROLOGICAL SURGERY
Payer: MEDICARE

## 2020-06-01 DIAGNOSIS — R26.81 UNSTEADY GAIT: ICD-10-CM

## 2020-06-01 DIAGNOSIS — G89.29 BILATERAL CHRONIC KNEE PAIN: ICD-10-CM

## 2020-06-01 DIAGNOSIS — M79.671 BILATERAL FOOT PAIN: ICD-10-CM

## 2020-06-01 DIAGNOSIS — M25.562 BILATERAL CHRONIC KNEE PAIN: ICD-10-CM

## 2020-06-01 DIAGNOSIS — M25.561 BILATERAL CHRONIC KNEE PAIN: ICD-10-CM

## 2020-06-01 DIAGNOSIS — M79.672 BILATERAL FOOT PAIN: ICD-10-CM

## 2020-06-01 DIAGNOSIS — R29.898 WEAKNESS OF BOTH LOWER EXTREMITIES: ICD-10-CM

## 2020-06-01 PROCEDURE — 97110 THERAPEUTIC EXERCISES: CPT | Mod: PN | Performed by: PHYSICAL THERAPIST

## 2020-06-01 NOTE — PATIENT INSTRUCTIONS
Adduction: Hip - Knees Together (Sitting)        Sit with towel roll between knees. Push knees together. Hold for 3 seconds. Repeat 10 times. Perform 2 sets. Do 1 times a day.    Copyright © I. All rights reserved.   Hip Flexion / Knee Extension: Straight-Leg Raise (Eccentric)        Lie on back. Lift leg with knee straight. Slowly lower leg for 3-5 seconds.  10 reps per set, 2 sets per day, 7 days per week. Lower like elevator, stopping at each floor.   Copyright © I. All rights reserved.       Copyright © 3589-8933 [employer]   ABDUCTION: Sitting - Resistance Band (Active)        Sit with feet flat. Lift right leg slightly and, against yellow resistance band, draw it out to side.  Complete 2 sets of 10 repetitions. Perform 1 sessions per day.    Copyright © [employer]. All rights reserved.   KNEE: Extension, Long Arc Quads - Sitting        Raise leg until knee is straight.  10 reps per set, 2 sets per day, 7 days per week    Copyright © I. All rights reserved.

## 2020-06-01 NOTE — PROGRESS NOTES
"  Physical Therapy Daily Treatment Note     Name: Kylie Aleman  Clinic Number: [id number]    Therapy Diagnosis:   Encounter Diagnoses   Name Primary?    Bilateral chronic knee pain     Bilateral foot pain     Unsteady gait     Weakness of both lower extremities      Physician: Mannie Hutchison MD    Visit Date: 6/1/2020    Physician Orders: PT Eval and Treat  Medical Diagnosis: M79.604 (ICD-10-CM) - Pain in right leg M79.605 (ICD-10-CM) - Pain in left leg R26.81 (ICD-10-CM) - Unsteadiness on feet     Evaluation Date: 5/9/19  Authorization Period Expiration: 3/11/2021  POC: 2/1/2020 to 3/13/2020   New POC: 5/18/2020 to 7/17/2020     Visit #/Visits authorized: 3/4  (total visits 2020 as of 6/1/20= 17)  (Total Visits 2019: 43)     Time In: 0930  Time Out: 1013  Total Billable Time: 40 minutes      Precautions: Standard      Subjective     Pt reports: my knees have been bothering me for the last few days  She was compliant with home exercise program for bridging, ankle pumps and marching  Response to previous treatment: no adverse effects reported  Functional change: ongoing     Pain: 5/10 prior to session, at conclusion of session, pain intensity rate at 5/10 but pt reported "feels better" than when she came in  Location: bilateral knees     Objective   HR upon arrival (after transfer w/c>stepper)= 132 bpm, O2= 95%    Target heart rate: 220-62= 158 bpm x 80%= 126 bpm    Kylie received therapeutic exercises to develop strength and endurance for 40 minutes including:    Nustep x8 minutes lvl 3 to improve muscular and CV endurance   HR after stepper use: 145 bpm, O2= 95%   After resting HR= 109 bpm    Attempted ambulation with RW- pt unable due to knee pain    Supine: straight leg raise 1# 2 x 10   Bridging with Blue thera band 2 x 10   Pelvic tilt with marching 2 x 10    Sitting: Long arc quads 3# 2 x 10   Hip adduction with ball 2 x 10        Home Exercises Provided and Patient Education Provided     Education " provided:   - cont HEP, educated in HEP to address knee pain    Written Home Exercises Provided: yes.  Exercises were reviewed and Kylie was able to demonstrate them prior to the end of the session.  Kylie demonstrated good  understanding of the education provided.     See EMR under Patient Instructions for exercises provided prior visit.    Assessment     Pt tolerated session fair today. Pt was unable to ambulate today due to bilateral knee pain (rated 5/10). Focus on today's session was having pt perform exercises to strengthen musculature surrounding the knee to decrease pain. HEP was issued and pt was instructed to perform exercises at least every other day or when knee pain is increased/ limiting mobility. Pt tolerated these exercises well and reported that knee pain was improved after performing. Pt's initial heart rate upon arrival to session was elevated and also likely limited her ability to ambulate today. Pt can benefit from continued skilled PT to address strength and activity tolerance impairments to improve her ability to ambulate vs use w/c for household mobility.      .Kylie is progressing well towards her goals.   Pt prognosis is Good.     Pt will continue to benefit from skilled outpatient physical therapy to address the deficits listed in the problem list box on initial evaluation, provide pt/family education and to maximize pt's level of independence in the home and community environment.     Pt's spiritual, cultural and educational needs considered and pt agreeable to plan of care and goals.     Anticipated barriers to physical therapy: none    Goals: formal status as of 5/18/2020   Short Term Goals:  4 weeks;  1.Report decreased in pain at worst less than <   / =  4  /10  to increase tolerance for functional activities. MET previously, no longer met (5/18/20)  2. Pt to improve B knee range of motion by 8 degrees to allow for improved functional mobility to allow for improvement in IADLs.  MET  3. Increased B knee  MMT 1/2  to increase tolerance for ADL and work activities. MET  4. Pt to report an ability to perform stand pivot transfer into her sofa. MET   5. Pt to tolerate HEP to improve ROM and independence with ADL's. MET  6. New 1/15/2020: pt will tolerate 6 minute walk test to demonstrate improved gait tolerance.- 2 min walk with RW declined= 88 ft, <1 min of gait tolerated     Long Term Goals:  8 weeks,   1. Report decreased in pain at worse less than  <   / =  2  /10  to increase tolerance for functional mobility. ongoing  2. Pt to improve B knee range of motion by 8 degrees to allow for improved functional mobility to allow for improvement in IADLs.  MET  3. Increased B knee MMT 1 grade  to increase tolerance for ADL and work activities. MET  4. Pt will scores report at CJ level (20-40% impaired) on LEFS  to demonstrate increase in LE function with every day tasks. progressing  5. Pt to be Independent with HEP to improve ROM and independence with ADL's. MET  6. New goal 8/14/2019: Pt will decrease score on TUG to </= 30 seconds in order to make her ambulation more functional. met 12/18/19              Revised 12/18/19:  pt will perform TUG in <20 sec to demonstrate decreased fall risk and improvement in mobility for household distance to decrease w/c use. Met 1/15/20, no longer met 5/18/20- 21.1 sec  7. New 12/19/19: pt will demonstrate improved general strength for being able to stand from various surfaces by performing 9 sit<>stands from chair without armrests.- ~same- 11x with use of armrests, LE support on chair  8. New 12/19/19: pt will perform static standing with intermittent UE support for 5 min to improve ability to perform some house hold duties (I.e. wiping a counter)- declined ~4 sec without UE support, SBA      Plan       Increase standing duration/ gait duration. Perform core and knee strengthening.        Yelena Ferrer, PT

## 2020-06-02 NOTE — PROGRESS NOTES
"  Physical Therapy Daily Treatment Note     Name: Kylie Aleman  Clinic Number: [id number]    Therapy Diagnosis:   Encounter Diagnoses   Name Primary?    Bilateral chronic knee pain     Bilateral foot pain     Unsteady gait     Weakness of both lower extremities      Physician: Mannie Hutchison MD    Visit Date: 6/3/2020    Physician Orders: PT Eval and Treat  Medical Diagnosis: M79.604 (ICD-10-CM) - Pain in right leg M79.605 (ICD-10-CM) - Pain in left leg R26.81 (ICD-10-CM) - Unsteadiness on feet     Evaluation Date: 5/9/19  Authorization Period Expiration: 3/11/2021  New POC: 5/18/2020 to 7/17/2020     Visit #/Visits authorized: 4/4  (total visits 2020 as of 6/1/20= 17)  (Total Visits 2019: 43)     Time In: 0935  Time Out: 1015  Total Billable Time: 35 minutes      Precautions: Standard      Subjective     Pt reports: knees feel better than last. My  has 1# and 5# ankle weights, he also has some dumbells (~5#)  She was compliant with home exercise program for knee strengthening  Response to previous treatment: no adverse effects reported  Functional change: ongoing     Pain: 5/10 prior to session, at conclusion of session, pain intensity rate at 5/10 but pt reported "feels better" than when she came in  Location: bilateral knees     Objective     Target heart rate: 220-62= 158 bpm x 80%= 126 bpm    Kylie received therapeutic exercises to develop strength and endurance for 35 minutes including:    Nustep x 4 min + 2 min minutes lvl 4 to improve muscular and CV endurance    HR after stepper use: 125 bpm, O2= 96%    Attempted ambulation with RW- pt unable due to knee pain and fatigue    Supine: straight leg raise 1# 2 x 10   Bridging with Blue thera band 2 x 10   Pelvic tilt with marching 2 x 10    Sitting: reverse fly Red thera band  2 x 10    Reviewed the following UE exercises to add for HEP:  press with dumbbells, bilateral shoulder abduction, bi      Home Exercises Provided and Patient " Education Provided     Education provided:   - reviewed UE strengthening that can be added to HEP  - cont HEP, educated in HEP to address knee pain    Written Home Exercises Provided: yes.  Exercises were reviewed and Kylie was able to demonstrate them prior to the end of the session.  Kylie demonstrated good  understanding of the education provided.     See EMR under Patient Instructions for exercises provided prior visit.    Assessment     Pt tolerated session fairly. Pt reported that knee pain is better than the last session, but limited gait attempt today. Pt reports performing Resistance on stepper was increased, pt required 1 rest break after 4 min due to fatigue/ shortness of breath. Cardiovascular response to activity was better than the last attended session. Pt can benefit from continued skilled PT to address strength and activity tolerance impairments to improve her ability to ambulate vs use w/c for household mobility.      .Kylie is progressing well towards her goals.   Pt prognosis is Good.     Pt will continue to benefit from skilled outpatient physical therapy to address the deficits listed in the problem list box on initial evaluation, provide pt/family education and to maximize pt's level of independence in the home and community environment.     Pt's spiritual, cultural and educational needs considered and pt agreeable to plan of care and goals.     Anticipated barriers to physical therapy: none    Goals: formal status as of 5/18/2020   Short Term Goals:  4 weeks;  1.Report decreased in pain at worst less than <   / =  4  /10  to increase tolerance for functional activities. MET previously, no longer met (5/18/20)  2. Pt to improve B knee range of motion by 8 degrees to allow for improved functional mobility to allow for improvement in IADLs. MET  3. Increased B knee  MMT 1/2  to increase tolerance for ADL and work activities. MET  4. Pt to report an ability to perform stand pivot transfer  into her sofa. MET   5. Pt to tolerate HEP to improve ROM and independence with ADL's. MET  6. New 1/15/2020: pt will tolerate 6 minute walk test to demonstrate improved gait tolerance.- 2 min walk with RW declined= 88 ft, <1 min of gait tolerated     Long Term Goals:  8 weeks,   1. Report decreased in pain at worse less than  <   / =  2  /10  to increase tolerance for functional mobility. ongoing  2. Pt to improve B knee range of motion by 8 degrees to allow for improved functional mobility to allow for improvement in IADLs.  MET  3. Increased B knee MMT 1 grade  to increase tolerance for ADL and work activities. MET  4. Pt will scores report at CJ level (20-40% impaired) on LEFS  to demonstrate increase in LE function with every day tasks. progressing  5. Pt to be Independent with HEP to improve ROM and independence with ADL's. MET  6. New goal 8/14/2019: Pt will decrease score on TUG to </= 30 seconds in order to make her ambulation more functional. met 12/18/19              Revised 12/18/19:  pt will perform TUG in <20 sec to demonstrate decreased fall risk and improvement in mobility for household distance to decrease w/c use. Met 1/15/20, no longer met 5/18/20- 21.1 sec  7. New 12/19/19: pt will demonstrate improved general strength for being able to stand from various surfaces by performing 9 sit<>stands from chair without armrests.- ~same- 11x with use of armrests, LE support on chair  8. New 12/19/19: pt will perform static standing with intermittent UE support for 5 min to improve ability to perform some house hold duties (I.e. wiping a counter)- declined ~4 sec without UE support, SBA      Plan     Increase standing duration/ gait duration as tolerated. Continue with core and knee strengthening.        Yelena Ferrer, PT

## 2020-06-03 ENCOUNTER — CLINICAL SUPPORT (OUTPATIENT)
Dept: REHABILITATION | Facility: HOSPITAL | Age: 60
End: 2020-06-03
Attending: NEUROLOGICAL SURGERY
Payer: MEDICARE

## 2020-06-03 DIAGNOSIS — G89.29 BILATERAL CHRONIC KNEE PAIN: ICD-10-CM

## 2020-06-03 DIAGNOSIS — R29.898 WEAKNESS OF BOTH LOWER EXTREMITIES: ICD-10-CM

## 2020-06-03 DIAGNOSIS — M79.672 BILATERAL FOOT PAIN: ICD-10-CM

## 2020-06-03 DIAGNOSIS — M25.562 BILATERAL CHRONIC KNEE PAIN: ICD-10-CM

## 2020-06-03 DIAGNOSIS — M25.561 BILATERAL CHRONIC KNEE PAIN: ICD-10-CM

## 2020-06-03 DIAGNOSIS — R26.81 UNSTEADY GAIT: ICD-10-CM

## 2020-06-03 DIAGNOSIS — M79.671 BILATERAL FOOT PAIN: ICD-10-CM

## 2020-06-03 PROCEDURE — 97110 THERAPEUTIC EXERCISES: CPT | Mod: PN | Performed by: PHYSICAL THERAPIST

## 2020-06-03 NOTE — PATIENT INSTRUCTIONS
Wheelchair Push-Up        Wheelchair in locked position, push up on armrests to lift bottom off seat.  Hold 3 seconds. Repeat 10 times. Do 1 sessions per day.    Copyright © I. All rights reserved.      Safety Tips    -Always give priority to M.D. and therapist guidelines. -Always lock brakes before performing exercise. -Watch your balance. -Listen to your body and if you notice increase in pain, shortness of breath, irregular pulse, or chest pain, STOP! -Follow 2 hour rule: 2 hours after exercising there should be no discomfort from exercising. -When appropriate, if able, move footrests out of the way and put feet on floor.    Copyright © [employer]. All rights reserved.   Reverse Fly / Shoulder Retraction        Extend both arms in front of body at shoulder height, thumbs up, holding band. Move arms out to sides, squeeze shoulder blades together. Use red band. Repeat 10 times. Do 2 sessions per day.    Abduction Lift With Soup Can        With a can in each hand, raise arms out to the sides and over head in a pain free range.  Repeat 10 times. Do 2 sessions per day.    Copyright © [employer]. All rights reserved.   Elevation:  Press - Incline (Dumbbell)        Lean back, neck in line with spine, weights held near shoulders, palms forward. Press straight up, touching weights above head, palms in.  Repeat 10 times per set. Do 2 sets per session. Do 1 sessions per day  Copyright © [employer]. All rights reserved.

## 2020-06-08 ENCOUNTER — DOCUMENTATION ONLY (OUTPATIENT)
Dept: REHABILITATION | Facility: HOSPITAL | Age: 60
End: 2020-06-08

## 2020-06-08 NOTE — PROGRESS NOTES
Missed Visit/Cancellation      Date: 6/8/2020         Canceled Number: 1  No Show Number: 1     Pt initially had visit scheduled for today for 0930  Reason for cancellation: weather  Pt's next scheduled physical therapy visit is 6/10/2020     Yelena Ferrer DPT  6/8/2020

## 2020-06-11 ENCOUNTER — DOCUMENTATION ONLY (OUTPATIENT)
Dept: REHABILITATION | Facility: HOSPITAL | Age: 60
End: 2020-06-11

## 2020-06-11 NOTE — PROGRESS NOTES
Missed Visit/Cancellation      Date: 6/10/2020         Canceled Number: 2  No Show Number: 1     Pt initially had visit scheduled for today for 0930  Reason for cancellation: weather  Pt's next scheduled physical therapy visit is 6/15/2020     SHAHID FoleyT  6/11/2020

## 2020-06-15 ENCOUNTER — CLINICAL SUPPORT (OUTPATIENT)
Dept: REHABILITATION | Facility: HOSPITAL | Age: 60
End: 2020-06-15
Attending: NEUROLOGICAL SURGERY
Payer: MEDICARE

## 2020-06-15 DIAGNOSIS — M25.562 BILATERAL CHRONIC KNEE PAIN: ICD-10-CM

## 2020-06-15 DIAGNOSIS — M79.671 BILATERAL FOOT PAIN: ICD-10-CM

## 2020-06-15 DIAGNOSIS — G89.29 BILATERAL CHRONIC KNEE PAIN: ICD-10-CM

## 2020-06-15 DIAGNOSIS — M79.672 BILATERAL FOOT PAIN: ICD-10-CM

## 2020-06-15 DIAGNOSIS — R26.81 UNSTEADY GAIT: ICD-10-CM

## 2020-06-15 DIAGNOSIS — M25.561 BILATERAL CHRONIC KNEE PAIN: ICD-10-CM

## 2020-06-15 DIAGNOSIS — R29.898 WEAKNESS OF BOTH LOWER EXTREMITIES: ICD-10-CM

## 2020-06-15 PROCEDURE — 97110 THERAPEUTIC EXERCISES: CPT | Mod: KX,PN | Performed by: PHYSICAL THERAPIST

## 2020-06-15 NOTE — PLAN OF CARE
Physical Therapy Daily Treatment Note     Name: Kylie Aleman  [facility] Number: [id number]    Therapy Diagnosis:   Encounter Diagnoses   Name Primary?    Bilateral chronic knee pain     Bilateral foot pain     Unsteady gait     Weakness of both lower extremities      Physician: Mannie Hutchison MD    Visit Date: 6/15/2020    Physician Orders: PT Eval and Treat  Medical Diagnosis: M79.604 (ICD-10-CM) - Pain in right leg M79.605 (ICD-10-CM) - Pain in left leg R26.81 (ICD-10-CM) - Unsteadiness on feet     Evaluation Date: 5/9/19  Authorization Period Expiration: 6/18/2020  New POC: 5/18/2020 to 7/17/2020     Visit #/Visits authorized: 1/12- KX MODIFIER  (total visits 2020 as of 6/1/20= 17)  (Total Visits 2019: 43)     Time In: 0936  Time Out: 1026  Total Billable Time: 40 minutes   Total treatment time: 50 min     Precautions: Standard      Subjective     Pt reports: knees feel better than last visit. I think I over-did my exercises last week at home, my knees were swollen after I did them  She was compliant with home exercise program for knee strengthening  Response to previous treatment: my knees were hurting and swollen  Functional change: ongoing     Pain: 4/10 prior to session, 0/10 at conclusion of session  Location: bilateral knees     Objective     Target heart rate: 220-62= 158 bpm x 80%= 126 bpm    Kylie received therapeutic exercises to develop strength and endurance for 40 minutes including:   RLE 5/18/20 RLE 6/15/20 LLE 5/18/20 LLE 6/15/20   Hip Flexion: 4+/5 4+/5 5/5 5/5   Hip Extension:  NT NT NT NT   Hip Abduction: NT 4+/5 NT 4+/5   Hip Adduction: NT NT NT NT   Knee Extension: 4+/5 5/5 5/5 5/5   Knee Flexion: 5/5 NT 5/5 NT   Ankle Dorsiflexion: 4/5 5/5 4+/5 5/5   Ankle Plantarflexion: 3+/5 4/5 4-/5 4/5           12/18/19 1/15/20 2/19/20 5/18/2020 6/15/2020   TUG 28.5 sec w/ RW 19 sec w/RW 21.5 sec w/RW 21.1sec with  29.4 sec   30 sec sit<>stand 14x with arms on armrests 11x arms on armrests 13x  arms on armrests (w/c) 11x arms on armrests 13x with arms on armrest   6 min walk NT 2 min tolerated with RW= 228 ft 2 min walk with RW= 188 ft 45 sec tolerated w/RW= 88ft 1 min tolerated w/RW= 72ft   TUG with RW= 33 sec + 27 sec + 28.2 sec= 29.4 sec    TUG norms: < 20 sec safe for independent transfers, < 30 sec safe for dependent transfers/assist required      Nustep x 5 min + 3 min minutes lvl 4 to improve muscular and CV endurance    HR after stepper use: 143 bpm, O2= 96%    Supine: short arc quads 1# 2 x 10, 3 sec hold   straight leg raise 1# 2 x 10   Hip adduction with ball 2 x 10      Ice applied to both knees at conclusion of session x 10 min    Home Exercises Provided and Patient Education Provided     Education provided:   - apply ice to knees after exercises or walking as needed to address swelling  - reviewed results of assessment    Written Home Exercises Provided: Patient instructed to cont prior HEP.  Exercises were reviewed and Kylie was able to demonstrate them prior to the end of the session.  Kylie demonstrated good  understanding of the education provided.     See EMR under Patient Instructions for exercises provided prior visit.    Assessment   assessment period: 5/21/2020 to 6/15/2020.    Kylie tolerates PT sessions fairly. Pt only attended 3 PT sessions since last reassessment. Pt cancelled 2 sessions due to weather. ambulation has been limited by bilateral knee pain (R>L). Pt demonstrates a gross improvement in LE strength per formal assessment and sit<>stand test, but gait is inconsistent. Upon today's assessment, pt was noted to stand with increased bilateral knee flexion and demonstrated a shorter stride with slower speed. Pt reported that her legs (espcially knees) were very stiff this morning. Minimal-moderate edema was noted in R knee upon observation. PT applied ice packs to bilateral knees at the conclusion of the session after performing exercises to address knee pain. Pt  reported improved knee pain at the end of the session. Pt was instructed to apply ice packs to knees daily and after exercising/ walking excessively to decrease edema and knee pain.  Pt can benefit from continued skilled PT to address strength and activity tolerance impairments to improve her ability to ambulate vs use w/c for household mobility.      .Kylie is progressing well towards her goals.   Pt prognosis is Good.     Pt will continue to benefit from skilled outpatient physical therapy to address the deficits listed in the problem list box on initial evaluation, provide pt/family education and to maximize pt's level of independence in the home and community environment.     Pt's spiritual, cultural and educational needs considered and pt agreeable to plan of care and goals.     Anticipated barriers to physical therapy: none    Goals: formal status as of 6/15/2020   Short Term Goals:  4 weeks;  1.Report decreased in pain at worst less than <   / =  4  /10  to increase tolerance for functional activities. ongoing  2. Pt to improve B knee range of motion by 8 degrees to allow for improved functional mobility to allow for improvement in IADLs. MET  3. Increased B knee  MMT 1/2  to increase tolerance for ADL and work activities. MET  4. Pt to report an ability to perform stand pivot transfer into her sofa. MET   5. Pt to tolerate HEP to improve ROM and independence with ADL's. MET  6. New 1/15/2020: pt will tolerate 6 minute walk test to demonstrate improved gait tolerance.- 2 min walk with RW inconsistent= 72 ft, 1 min of gait tolerated     Long Term Goals:  8 weeks,   1. Report decreased in pain at worse less than  <   / =  2  /10  to increase tolerance for functional mobility. ongoing  2. Pt to improve B knee range of motion by 8 degrees to allow for improved functional mobility to allow for improvement in IADLs.  MET  3. Increased B knee MMT 1 grade  to increase tolerance for ADL and work activities.  MET  4. Pt will scores report at CJ level (20-40% impaired) on LEFS  to demonstrate increase in LE function with every day tasks. progressing  5. Pt to be Independent with HEP to improve ROM and independence with ADL's. MET  6. New goal 8/14/2019: Pt will decrease score on TUG to </= 30 seconds in order to make her ambulation more functional. met 12/18/19              Revised 12/18/19:  pt will perform TUG in <20 sec to demonstrate decreased fall risk and improvement in mobility for household distance to decrease w/c use. Met 1/15/20, no longer met 6/15/2020- 29.4 sec with RW  7. New 12/19/19: pt will demonstrate improved general strength for being able to stand from various surfaces by performing 9 sit<>stands from chair without armrests.- improved- 13x with use of armrests  8. New 12/19/19: pt will perform static standing with intermittent UE support for 5 min to improve ability to perform some house hold duties (I.e. wiping a counter)- declined ~4 sec without UE support, SBA      Plan     Increase standing duration/ gait duration as tolerated. Apply ice at the end of sessions to address knee pain      Yelena Ferrer, PT

## 2020-06-17 ENCOUNTER — CLINICAL SUPPORT (OUTPATIENT)
Dept: REHABILITATION | Facility: HOSPITAL | Age: 60
End: 2020-06-17
Attending: NEUROLOGICAL SURGERY
Payer: MEDICARE

## 2020-06-17 DIAGNOSIS — R29.898 WEAKNESS OF BOTH LOWER EXTREMITIES: ICD-10-CM

## 2020-06-17 DIAGNOSIS — M79.671 BILATERAL FOOT PAIN: ICD-10-CM

## 2020-06-17 DIAGNOSIS — R26.81 UNSTEADY GAIT: ICD-10-CM

## 2020-06-17 DIAGNOSIS — M79.672 BILATERAL FOOT PAIN: ICD-10-CM

## 2020-06-17 DIAGNOSIS — M25.562 BILATERAL CHRONIC KNEE PAIN: ICD-10-CM

## 2020-06-17 DIAGNOSIS — M25.561 BILATERAL CHRONIC KNEE PAIN: ICD-10-CM

## 2020-06-17 DIAGNOSIS — G89.29 BILATERAL CHRONIC KNEE PAIN: ICD-10-CM

## 2020-06-17 PROCEDURE — 97110 THERAPEUTIC EXERCISES: CPT | Mod: KX,PN | Performed by: PHYSICAL THERAPIST

## 2020-06-17 PROCEDURE — 97010 HOT OR COLD PACKS THERAPY: CPT | Mod: PN | Performed by: PHYSICAL THERAPIST

## 2020-06-17 NOTE — PROGRESS NOTES
Physical Therapy Daily Treatment Note      Name: Kylie Aleman  [facility] Number: [id number]     Therapy Diagnosis:        Encounter Diagnoses   Name Primary?    Bilateral chronic knee pain      Bilateral foot pain      Unsteady gait      Weakness of both lower extremities        Physician: Mannie Hutchison MD     Visit Date: 6/17/2020      Physician Orders: PT Eval and Treat  Medical Diagnosis: M79.604 (ICD-10-CM) - Pain in right leg M79.605 (ICD-10-CM) - Pain in left leg R26.81 (ICD-10-CM) - Unsteadiness on feet     Evaluation Date: 5/9/19  Authorization Period Expiration: 6/18/2020  New POC: 5/18/2020 to 7/17/2020     Visit #/Visits authorized: 2/12- KX MODIFIER  (total visits 2020 as of 6/1/20= 17)  (Total Visits 2019: 43)     Time In: 0934  Time Out: 1025  Total Billable Time: 41 minutes   Total treatment time: 51 min     Precautions: Standard        Subjective      Pt reports: my knees keep swelling, pt reports applying ice at home to address. Has been limiting walking due to knee pain and swelling  She was compliant with home exercise program for knee strengthening  Response to previous treatment: my knees were hurting and swollen  Functional change: ongoing      Pain: 6/10 prior to session, 0/10 at conclusion of session  Location: bilateral knees      Objective      Target heart rate: 220-62= 158 bpm x 80%= 126 bpm     Kylie received therapeutic exercises to develop strength and endurance for 41 minutes including:     Nustep x 8 min lvl 4 to improve muscular and CV endurance           Supine: bridging 10x   Quad sets with towel roll 2 x 10   short arc quads 1# 2 x 10, 3 sec hold              straight leg raise 1# 2 x 10   Heel slides with slide board 2 x 10              Hip adduction with ball 2 x 10                            Ice applied to both knees at conclusion of session x 10 min     Home Exercises Provided and Patient Education Provided      Education provided:   - continue to apply ice to  knees to address pain and swelling     Written Home Exercises Provided: Patient instructed to cont prior HEP.  Exercises were reviewed and Kylie was able to demonstrate them prior to the end of the session.  Kylie demonstrated good  understanding of the education provided.      See EMR under Patient Instructions for exercises provided prior visit.     Assessment   Kylie tolerated PT session well. Standing/ gait was not addressed today due to visible bilateral knee edema (moderate) and pt's report of increased pain. Pt demonstrated a good tolerance to all exercises performed today. PT added quad sets for improved vastus medialis oblique activation and heel slides for improved knee range of Motion.  Pt can benefit from continued skilled PT to address strength and activity tolerance impairments to improve her ability to ambulate vs use w/c for household mobility.       .Kylie is progressing well towards her goals.   Pt prognosis is Good.      Pt will continue to benefit from skilled outpatient physical therapy to address the deficits listed in the problem list box on initial evaluation, provide pt/family education and to maximize pt's level of independence in the home and community environment.      Pt's spiritual, cultural and educational needs considered and pt agreeable to plan of care and goals.     Anticipated barriers to physical therapy: none     Goals: formal status as of 6/15/2020   Short Term Goals:  4 weeks;  1.Report decreased in pain at worst less than <   / =  4  /10  to increase tolerance for functional activities. ongoing  2. Pt to improve B knee range of motion by 8 degrees to allow for improved functional mobility to allow for improvement in IADLs. MET  3. Increased B knee  MMT 1/2  to increase tolerance for ADL and work activities. MET  4. Pt to report an ability to perform stand pivot transfer into her sofa. MET   5. Pt to tolerate HEP to improve ROM and independence with ADL's. MET  6. New  1/15/2020: pt will tolerate 6 minute walk test to demonstrate improved gait tolerance.- 2 min walk with RW inconsistent= 72 ft, 1 min of gait tolerated     Long Term Goals:  8 weeks,   1. Report decreased in pain at worse less than  <   / =  2  /10  to increase tolerance for functional mobility. ongoing  2. Pt to improve B knee range of motion by 8 degrees to allow for improved functional mobility to allow for improvement in IADLs.  MET  3. Increased B knee MMT 1 grade  to increase tolerance for ADL and work activities. MET  4. Pt will scores report at CJ level (20-40% impaired) on LEFS  to demonstrate increase in LE function with every day tasks. progressing  5. Pt to be Independent with HEP to improve ROM and independence with ADL's. MET  6. New goal 8/14/2019: Pt will decrease score on TUG to </= 30 seconds in order to make her ambulation more functional. met 12/18/19              Revised 12/18/19:  pt will perform TUG in <20 sec to demonstrate decreased fall risk and improvement in mobility for household distance to decrease w/c use. Met 1/15/20, no longer met 6/15/2020- 29.4 sec with RW  7. New 12/19/19: pt will demonstrate improved general strength for being able to stand from various surfaces by performing 9 sit<>stands from chair without armrests.- improved- 13x with use of armrests  8. New 12/19/19: pt will perform static standing with intermittent UE support for 5 min to improve ability to perform some house hold duties (I.e. wiping a counter)- declined ~4 sec without UE support, SBA        Plan      Limit standing as needed depending on knee pain and edema. Apply ice at the end of sessions to address knee pain        Yelena Ferrer, PT

## 2020-06-18 ENCOUNTER — DOCUMENTATION ONLY (OUTPATIENT)
Dept: REHABILITATION | Facility: HOSPITAL | Age: 60
End: 2020-06-18

## 2020-06-18 NOTE — PROGRESS NOTES
PT/PTA STAFFING      Name: Kylie Aleman  [facility] Number: [id number]    Date of staffin2020      DME/ orders needed: No    Changes to POC: limit gait while knee pain is increased and swollen. Ice at end of session. Perform mat exercises as tolerated     Continue with POC: Yes    Yelena Ferrer,DPT  Vaibhav Tejada, PTA

## 2020-06-24 ENCOUNTER — CLINICAL SUPPORT (OUTPATIENT)
Dept: REHABILITATION | Facility: HOSPITAL | Age: 60
End: 2020-06-24
Attending: NEUROLOGICAL SURGERY
Payer: MEDICARE

## 2020-06-24 DIAGNOSIS — R29.898 WEAKNESS OF LOWER EXTREMITY, UNSPECIFIED LATERALITY: ICD-10-CM

## 2020-06-24 DIAGNOSIS — M25.562 BILATERAL CHRONIC KNEE PAIN: Primary | ICD-10-CM

## 2020-06-24 DIAGNOSIS — M25.561 BILATERAL CHRONIC KNEE PAIN: Primary | ICD-10-CM

## 2020-06-24 DIAGNOSIS — G89.29 BILATERAL CHRONIC KNEE PAIN: Primary | ICD-10-CM

## 2020-06-24 DIAGNOSIS — M79.672 BILATERAL FOOT PAIN: ICD-10-CM

## 2020-06-24 DIAGNOSIS — R26.81 UNSTEADY GAIT: ICD-10-CM

## 2020-06-24 DIAGNOSIS — M79.671 BILATERAL FOOT PAIN: ICD-10-CM

## 2020-06-24 PROCEDURE — 97110 THERAPEUTIC EXERCISES: CPT | Mod: PN,CQ

## 2020-06-24 NOTE — PROGRESS NOTES
Physical Therapy Daily Treatment Note      Name: Kylie Aleman  Clinic Number: [id number]     Therapy Diagnosis:        Encounter Diagnoses   Name Primary?    Bilateral chronic knee pain      Bilateral foot pain      Unsteady gait      Weakness of both lower extremities        Physician: Mannie Hutchison MD     Visit Date: 6/24/2020      Physician Orders: PT Eval and Treat  Medical Diagnosis: M79.604 (ICD-10-CM) - Pain in right leg M79.605 (ICD-10-CM) - Pain in left leg R26.81 (ICD-10-CM) - Unsteadiness on feet     Evaluation Date: 5/9/19  Authorization Period Expiration: 6/18/2020  New POC: 5/18/2020 to 7/17/2020     Visit #/Visits authorized: 3/12- KX MODIFIER  (total visits 2020 as of 6/1/20= 17)  (Total Visits 2019: 43)     Time In: 0845 ( 15 mins late)  Time Out: 0925  Total Billable Time:  minutes   Total treatment time:  min     Precautions: Standard        Subjective      Pt reports: Been feeling light headed all morning. Did not sleep well.  She was compliant with home exercise program for knee strengthening  Response to previous treatment: my knees were hurting and swollen  Functional change: ongoing      Pain: 5/10 prior to session, 0/10 at conclusion of session  Location: bilateral knees  And feet     Objective      Target heart rate: 220-62= 158 bpm x 80%= 126 bpm     Kylie received therapeutic exercises to develop strength and endurance for 41 minutes including:     Nustep x 8 min lvl 4 to improve muscular and CV endurance           Supine: bridging 10x   Quad sets with towel roll 2 x 10   short arc quads 1# 2 x 10, 3 sec hold              straight leg raise 1# 2 x 10   Heel slides with slide board 2 x 10              Hip adduction with ball 2 x 10                            Ice applied to both knees at conclusion of session x 10 min     Home Exercises Provided and Patient Education Provided      Education provided:   - continue to apply ice to knees to address pain and swelling     Written  Home Exercises Provided: Patient instructed to cont prior HEP.  Exercises were reviewed and Kylie was able to demonstrate them prior to the end of the session.  Kylie demonstrated good  understanding of the education provided.      See EMR under Patient Instructions for exercises provided prior visit.     Assessment   Pt tolerated session  fair today. Pt was unable to finish Nustep exercise secondary to fatigue and R knee pain. Pt did not experience any pain with performed therex on mat. Pt is independent with transfers from . Assess and potentially add standing exercise  NV     .Kylie is progressing well towards her goals.   Pt prognosis is Good.      Pt will continue to benefit from skilled outpatient physical therapy to address the deficits listed in the problem list box on initial evaluation, provide pt/family education and to maximize pt's level of independence in the home and community environment.      Pt's spiritual, cultural and educational needs considered and pt agreeable to plan of care and goals.     Anticipated barriers to physical therapy: none     Goals: formal status as of 6/15/2020   Short Term Goals:  4 weeks;  1.Report decreased in pain at worst less than <   / =  4  /10  to increase tolerance for functional activities. ongoing  2. Pt to improve B knee range of motion by 8 degrees to allow for improved functional mobility to allow for improvement in IADLs. MET  3. Increased B knee  MMT 1/2  to increase tolerance for ADL and work activities. MET  4. Pt to report an ability to perform stand pivot transfer into her sofa. MET   5. Pt to tolerate HEP to improve ROM and independence with ADL's. MET  6. New 1/15/2020: pt will tolerate 6 minute walk test to demonstrate improved gait tolerance.- 2 min walk with RW inconsistent= 72 ft, 1 min of gait tolerated     Long Term Goals:  8 weeks,   1. Report decreased in pain at worse less than  <   / =  2  /10  to increase tolerance for functional  mobility. ongoing  2. Pt to improve B knee range of motion by 8 degrees to allow for improved functional mobility to allow for improvement in IADLs.  MET  3. Increased B knee MMT 1 grade  to increase tolerance for ADL and work activities. MET  4. Pt will scores report at CJ level (20-40% impaired) on LEFS  to demonstrate increase in LE function with every day tasks. progressing  5. Pt to be Independent with HEP to improve ROM and independence with ADL's. MET  6. New goal 8/14/2019: Pt will decrease score on TUG to </= 30 seconds in order to make her ambulation more functional. met 12/18/19              Revised 12/18/19:  pt will perform TUG in <20 sec to demonstrate decreased fall risk and improvement in mobility for household distance to decrease w/c use. Met 1/15/20, no longer met 6/15/2020- 29.4 sec with RW  7. New 12/19/19: pt will demonstrate improved general strength for being able to stand from various surfaces by performing 9 sit<>stands from chair without armrests.- improved- 13x with use of armrests  8. New 12/19/19: pt will perform static standing with intermittent UE support for 5 min to improve ability to perform some house hold duties (I.e. wiping a counter)- declined ~4 sec without UE support, SBA        Plan      Limit standing as needed depending on knee pain and edema. Apply ice at the end of sessions to address knee pain        Vaibhav Tejada, PTA

## 2020-06-29 ENCOUNTER — CLINICAL SUPPORT (OUTPATIENT)
Dept: REHABILITATION | Facility: HOSPITAL | Age: 60
End: 2020-06-29
Attending: NEUROLOGICAL SURGERY
Payer: MEDICARE

## 2020-06-29 DIAGNOSIS — G89.29 BILATERAL CHRONIC KNEE PAIN: ICD-10-CM

## 2020-06-29 DIAGNOSIS — M25.561 BILATERAL CHRONIC KNEE PAIN: ICD-10-CM

## 2020-06-29 DIAGNOSIS — R29.898 WEAKNESS OF BOTH LOWER EXTREMITIES: ICD-10-CM

## 2020-06-29 DIAGNOSIS — R26.81 UNSTEADY GAIT: ICD-10-CM

## 2020-06-29 DIAGNOSIS — M25.562 BILATERAL CHRONIC KNEE PAIN: ICD-10-CM

## 2020-06-29 DIAGNOSIS — M79.672 BILATERAL FOOT PAIN: ICD-10-CM

## 2020-06-29 DIAGNOSIS — M79.671 BILATERAL FOOT PAIN: ICD-10-CM

## 2020-06-29 PROCEDURE — 97110 THERAPEUTIC EXERCISES: CPT | Mod: KX,PN | Performed by: PHYSICAL THERAPIST

## 2020-06-29 PROCEDURE — 97010 HOT OR COLD PACKS THERAPY: CPT | Mod: PN | Performed by: PHYSICAL THERAPIST

## 2020-06-29 NOTE — PROGRESS NOTES
"  Physical Therapy Daily Treatment Note      Name: Kylie Aleman  Clinic Number: [id number]     Therapy Diagnosis:        Encounter Diagnoses   Name Primary?    Bilateral chronic knee pain      Bilateral foot pain      Unsteady gait      Weakness of both lower extremities        Physician: Mannei Hutchison MD     Visit Date: 6/29/2020      Physician Orders: PT Eval and Treat  Medical Diagnosis: M79.604 (ICD-10-CM) - Pain in right leg M79.605 (ICD-10-CM) - Pain in left leg R26.81 (ICD-10-CM) - Unsteadiness on feet     Evaluation Date: 5/9/19  Authorization Period Expiration: 6/18/2020  New POC: 5/18/2020 to 7/17/2020     Visit #/Visits authorized: 4/12- KX MODIFIER  (total visits 2020 as of 6/1/20= 17)  (Total Visits 2019: 43)     Time In: 0938 (late)  Time Out: 1030  Total Billable Time: 42 minutes   Total treatment time: 52 min     Precautions: Standard        Subjective      Pt reports: I woke up late, feeling kind of "funny". Pt reports onset of R hip pain this morning.   She was compliant with home exercise program for knee strengthening  Response to previous treatment: no adverse effects reported  Functional change: ongoing      Pain: 4/10 prior to session, 0/10 knees at conclusion of session  Location: bilateral knees  And from R hip down to knee     Objective     Bold= new or progression    Target heart rate: 220-62= 158 bpm x 80%= 126 bpm     Kylie received therapeutic exercises to develop strength and endurance for 42 minutes including:     Nustep x 8 min lvl 3 to improve muscular and CV endurance (2 min (L4) + 3 min (L3) + 1 min (L3))     HR after stepper= 116 bpm    Supine: bridging 2 x 10   Quad sets with double towel roll 2 x 10   single knee to chest with towel 5 x 10 sec- 2 sets   short arc quads 1# 2 x 10, 3 sec hold              Hip adduction with ball 2 x 10   Hip abduction Blue thera band 20x    Sitting: hamstring stretch with belt 2 x 30 sec   Hip ER stretch 2 x 30 sec                     "        Ice applied to both knees at conclusion of session x 10 min     Home Exercises Provided and Patient Education Provided      Education provided:   - perform hamstring stretch and piriformis stretch daily to address pain     Written Home Exercises Provided: yes and Patient instructed to cont prior HEP.  Exercises were reviewed and Kylie was able to demonstrate them prior to the end of the session.  Kylie demonstrated good  understanding of the education provided.      See EMR under Patient Instructions for exercises provided 6/29/2020     Assessment   Kylie tolerated PT session fair to good. Pt reports onset of R hip pain that extends from hip to knee (began ~2 days ago). PT instructed pt in piriformis and hamstring stretching to address. PT also focused more on core strengthening this session to address pain. No gait was performed today to address more strengthening for improvement of pain. Pt denied significant change in R hip pain at conclusion of session. Pt provided with new stretches to perform at home to address R hip pain. Pt can benefit from continued skilled PT to address strength and stretching to improve pain for improved mobility.       .Kylie is progressing well towards her goals.   Pt prognosis is Good.      Pt will continue to benefit from skilled outpatient physical therapy to address the deficits listed in the problem list box on initial evaluation, provide pt/family education and to maximize pt's level of independence in the home and community environment.      Pt's spiritual, cultural and educational needs considered and pt agreeable to plan of care and goals.     Anticipated barriers to physical therapy: none     Goals: formal status as of 6/15/2020   Short Term Goals:  4 weeks;  1.Report decreased in pain at worst less than <   / =  4  /10  to increase tolerance for functional activities. ongoing  2. Pt to improve B knee range of motion by 8 degrees to allow for improved functional  mobility to allow for improvement in IADLs. MET  3. Increased B knee  MMT 1/2  to increase tolerance for ADL and work activities. MET  4. Pt to report an ability to perform stand pivot transfer into her sofa. MET   5. Pt to tolerate HEP to improve ROM and independence with ADL's. MET  6. New 1/15/2020: pt will tolerate 6 minute walk test to demonstrate improved gait tolerance.- 2 min walk with RW inconsistent= 72 ft, 1 min of gait tolerated     Long Term Goals:  8 weeks,   1. Report decreased in pain at worse less than  <   / =  2  /10  to increase tolerance for functional mobility. ongoing  2. Pt to improve B knee range of motion by 8 degrees to allow for improved functional mobility to allow for improvement in IADLs.  MET  3. Increased B knee MMT 1 grade  to increase tolerance for ADL and work activities. MET  4. Pt will scores report at CJ level (20-40% impaired) on LEFS  to demonstrate increase in LE function with every day tasks. progressing  5. Pt to be Independent with HEP to improve ROM and independence with ADL's. MET  6. New goal 8/14/2019: Pt will decrease score on TUG to </= 30 seconds in order to make her ambulation more functional. met 12/18/19              Revised 12/18/19:  pt will perform TUG in <20 sec to demonstrate decreased fall risk and improvement in mobility for household distance to decrease w/c use. Met 1/15/20, no longer met 6/15/2020- 29.4 sec with RW  7. New 12/19/19: pt will demonstrate improved general strength for being able to stand from various surfaces by performing 9 sit<>stands from chair without armrests.- improved- 13x with use of armrests  8. New 12/19/19: pt will perform static standing with intermittent UE support for 5 min to improve ability to perform some house hold duties (I.e. wiping a counter)- declined ~4 sec without UE support, SBA        Plan     Address hamstring stretch and piriformis stretch to address R hip>knee pain  Limit standing as needed depending on knee  pain and edema. Apply ice at the end of sessions to address knee pain     Yelena Ferrer,XANDER  6/29/2020

## 2020-06-29 NOTE — PATIENT INSTRUCTIONS
Copyright © 0518-2332 [employer]    Piriformis Stretch, Sitting        Sit, one ankle on opposite knee, same-side hand on crossed knee. Push down on knee, keeping spine straight. Lean torso forward, with flat back, until tension is felt in hamstrings and gluteals of crossed-leg side. Hold 30 seconds.   Repeat 2 times per session. Do 1 sessions per day.    Copyright © I. All rights reserved.

## 2020-07-01 ENCOUNTER — CLINICAL SUPPORT (OUTPATIENT)
Dept: REHABILITATION | Facility: HOSPITAL | Age: 60
End: 2020-07-01
Attending: NEUROLOGICAL SURGERY
Payer: MEDICARE

## 2020-07-01 DIAGNOSIS — M79.671 BILATERAL FOOT PAIN: ICD-10-CM

## 2020-07-01 DIAGNOSIS — M25.561 BILATERAL CHRONIC KNEE PAIN: ICD-10-CM

## 2020-07-01 DIAGNOSIS — G89.29 BILATERAL CHRONIC KNEE PAIN: ICD-10-CM

## 2020-07-01 DIAGNOSIS — M25.562 BILATERAL CHRONIC KNEE PAIN: ICD-10-CM

## 2020-07-01 DIAGNOSIS — R26.81 UNSTEADY GAIT: ICD-10-CM

## 2020-07-01 DIAGNOSIS — M79.672 BILATERAL FOOT PAIN: ICD-10-CM

## 2020-07-01 DIAGNOSIS — R29.898 WEAKNESS OF LOWER EXTREMITY, UNSPECIFIED LATERALITY: ICD-10-CM

## 2020-07-01 PROCEDURE — 97110 THERAPEUTIC EXERCISES: CPT | Mod: KX,PN | Performed by: PHYSICAL THERAPIST

## 2020-07-01 NOTE — PROGRESS NOTES
Physical Therapy Daily Treatment Note      Name: Kylie Aleman  Clinic Number: [id number]     Therapy Diagnosis:        Encounter Diagnoses   Name Primary?    Bilateral chronic knee pain      Bilateral foot pain      Unsteady gait      Weakness of both lower extremities        Physician: Mannie Hutchison MD     Visit Date: 7/1/2020      Physician Orders: PT Eval and Treat  Medical Diagnosis: M79.604 (ICD-10-CM) - Pain in right leg M79.605 (ICD-10-CM) - Pain in left leg R26.81 (ICD-10-CM) - Unsteadiness on feet     Evaluation Date: 5/9/19  Authorization Period Expiration: 6/18/2020  New POC: 5/18/2020 to 7/17/2020     Visit #/Visits authorized: 5/12- KX MODIFIER  (total visits 2020 as of 6/1/20= 17)  (Total Visits 2019: 43)     Time In: 0850  Time Out: 0945  Total Billable Time: 45 minutes   Total treatment time: 55 min     Precautions: Standard        Subjective      Pt reports: I feel weak, but I started a new diet (reports eating only 1 meal/day)  She was compliant with home exercise program for stretching  Response to previous treatment: R hip feels slightly better  Functional change: ongoing      Pain: 4/10 prior to session, sore knees, but improved pain at conclusion of session  Location: bilateral knees and feet     Objective     Bold= new or progression    Target heart rate: 220-62= 158 bpm x 80%= 126 bpm     Kylie received therapeutic exercises to develop strength and endurance for 45 minutes including:     Nustep 4 min x 2 min lvl 3 to improve muscular and CV endurance    HR after stepper= 113 bpm, O2= 93%    Supine: bridging 2 x 10   Quad sets with double towel roll 2 x 10   single knee to chest with towel 5 x 10 sec- 2 sets   short arc quads 1# 2 x 10, 3 sec hold              Hip adduction with ball 30x   Hip abduction Blue thera band 30x   Hip abduction with sliding board 20x    Sitting: hamstring stretch with belt 2 x 30 sec   Hip ER stretch 2 x 30 sec                            Ice applied to  "both knees at conclusion of session x 10 min     Home Exercises Provided and Patient Education Provided      Education provided:   - continue with HEP and stretching  - educated on eating >1 meal/day, and/or adding nutritious snacks such as fruits, vegetables, or nuts for energy. Also discussed ensuring that meals contain good proteins such as chicken for improved energy.      Written Home Exercises Provided: Patient instructed to cont prior HEP.  Exercises were reviewed and Kylie was able to demonstrate them prior to the end of the session.  Kylie demonstrated good  understanding of the education provided.      See EMR under Patient Instructions for exercises provided 6/29/2020     Assessment   Kylie tolerated PT session well. Pt reports that she is not ambulating t home due to bilateral knee pain. PT continues to address with strengthening exercises. Pt reports a "slight improvement" in R hip pain since new stretches were initiated. Pt denies performance on resisted hip abduction at home. Increased practice with hip abduction strengthening practiced today to compensate. MD visit to assess knee pain scheduled for 7/7/2020. Pt can benefit from continued skilled PT to address strength and stretching to decrease pain for improved mobility.       .Kylie is progressing well towards her goals.   Pt prognosis is Good.      Pt will continue to benefit from skilled outpatient physical therapy to address the deficits listed in the problem list box on initial evaluation, provide pt/family education and to maximize pt's level of independence in the home and community environment.      Pt's spiritual, cultural and educational needs considered and pt agreeable to plan of care and goals.     Anticipated barriers to physical therapy: none     Goals: formal status as of 6/15/2020   Short Term Goals:  4 weeks;  1.Report decreased in pain at worst less than <   / =  4  /10  to increase tolerance for functional activities. " ongoing  2. Pt to improve B knee range of motion by 8 degrees to allow for improved functional mobility to allow for improvement in IADLs. MET  3. Increased B knee  MMT 1/2  to increase tolerance for ADL and work activities. MET  4. Pt to report an ability to perform stand pivot transfer into her sofa. MET   5. Pt to tolerate HEP to improve ROM and independence with ADL's. MET  6. New 1/15/2020: pt will tolerate 6 minute walk test to demonstrate improved gait tolerance.- 2 min walk with RW inconsistent= 72 ft, 1 min of gait tolerated     Long Term Goals:  8 weeks,   1. Report decreased in pain at worse less than  <   / =  2  /10  to increase tolerance for functional mobility. ongoing  2. Pt to improve B knee range of motion by 8 degrees to allow for improved functional mobility to allow for improvement in IADLs.  MET  3. Increased B knee MMT 1 grade  to increase tolerance for ADL and work activities. MET  4. Pt will scores report at CJ level (20-40% impaired) on LEFS  to demonstrate increase in LE function with every day tasks. progressing  5. Pt to be Independent with HEP to improve ROM and independence with ADL's. MET  6. New goal 8/14/2019: Pt will decrease score on TUG to </= 30 seconds in order to make her ambulation more functional. met 12/18/19              Revised 12/18/19:  pt will perform TUG in <20 sec to demonstrate decreased fall risk and improvement in mobility for household distance to decrease w/c use. Met 1/15/20, no longer met 6/15/2020- 29.4 sec with RW  7. New 12/19/19: pt will demonstrate improved general strength for being able to stand from various surfaces by performing 9 sit<>stands from chair without armrests.- improved- 13x with use of armrests  8. New 12/19/19: pt will perform static standing with intermittent UE support for 5 min to improve ability to perform some house hold duties (I.e. wiping a counter)- declined ~4 sec without UE support, SBA        Plan     Increase resistance for  short arc quads to 2#  Limit standing as needed depending on knee pain and edema. Apply ice at the end of sessions to address knee pain     Yelena Ferrer,DPT  7/1/2020

## 2020-07-06 ENCOUNTER — CLINICAL SUPPORT (OUTPATIENT)
Dept: REHABILITATION | Facility: HOSPITAL | Age: 60
End: 2020-07-06
Attending: NEUROLOGICAL SURGERY
Payer: MEDICARE

## 2020-07-06 DIAGNOSIS — G89.29 BILATERAL CHRONIC KNEE PAIN: Primary | ICD-10-CM

## 2020-07-06 DIAGNOSIS — M79.672 BILATERAL FOOT PAIN: ICD-10-CM

## 2020-07-06 DIAGNOSIS — M79.671 BILATERAL FOOT PAIN: ICD-10-CM

## 2020-07-06 DIAGNOSIS — M25.561 BILATERAL CHRONIC KNEE PAIN: Primary | ICD-10-CM

## 2020-07-06 DIAGNOSIS — R26.81 UNSTEADY GAIT: ICD-10-CM

## 2020-07-06 DIAGNOSIS — M25.562 BILATERAL CHRONIC KNEE PAIN: Primary | ICD-10-CM

## 2020-07-06 DIAGNOSIS — R29.898 WEAKNESS OF LOWER EXTREMITY, UNSPECIFIED LATERALITY: ICD-10-CM

## 2020-07-06 PROCEDURE — 97110 THERAPEUTIC EXERCISES: CPT | Mod: KX,PN,CQ

## 2020-07-06 NOTE — PROGRESS NOTES
"  Physical Therapy Daily Treatment Note      Name: Kylie Aleman  Clinic Number: [id number]     Therapy Diagnosis:        Encounter Diagnoses   Name Primary?    Bilateral chronic knee pain      Bilateral foot pain      Unsteady gait      Weakness of both lower extremities        Physician: Mannie Hutchison MD     Visit Date: 7/6/2020      Physician Orders: PT Eval and Treat  Medical Diagnosis: M79.604 (ICD-10-CM) - Pain in right leg M79.605 (ICD-10-CM) - Pain in left leg R26.81 (ICD-10-CM) - Unsteadiness on feet     Evaluation Date: 5/9/19  Authorization Period Expiration: 6/18/2020  New POC: 5/18/2020 to 7/17/2020     Visit #/Visits authorized: 5/12- KX MODIFIER  (total visits 2020 as of 6/1/20= 17)  (Total Visits 2019: 43)     Time In: 0905  Time Out: 1000  Total Billable Time: 45 minutes   Total treatment time: 55 min     Precautions: Standard        Subjective      Pt reports: some stiffness in the R knee. Pt is performing HEP and hips: "so far, so good", though there is some R hip pain.  Pt reports she has not been walking because of the R knee.  She was compliant with home exercise program for stretching but not using ice on knees at home.  Response to previous treatment: R hip feels slightly better  Functional change: ongoing      Pain: 4/10 prior to session, sore knees, but improved pain at conclusion of session  Location: bilateral knees and feet     Objective     Bold= new or progression    Target heart rate: 220-62= 158 bpm x 80%= 126 bpm     Kylie received therapeutic exercises to develop strength and endurance for 45 minutes including:     Nustep 4 min x 2  lvl 3 to improve muscular and CV endurance        Supine: bridging 2 x 10   Quad sets with double towel roll 2 x 10   single knee to chest with towel 5 x 10 sec- 2 sets   short arc quads 1# 2 x 10, 3 sec hold - **progress next session              Hip adduction with ball 30x   Hip abduction Blue thera band 30x   Hip abduction with sliding " "board 20x     Sitting: hamstring stretch with belt 2 x 30 sec   Hip ER stretch 2 x 30 sec                            Ice applied to both knees at conclusion of session x 10 min    Attempted gait, pt performed STS and sat back down secondary to R knee pain.     Home Exercises Provided and Patient Education Provided      Education provided:   - continue with HEP and stretching  - educated on eating >1 meal/day, and/or adding nutritious snacks such as fruits, vegetables, or nuts for energy. Also discussed ensuring that meals contain good proteins such as chicken for improved energy.      Written Home Exercises Provided: Patient instructed to cont prior HEP.  Exercises were reviewed and Kylie was able to demonstrate them prior to the end of the session.  Kylie demonstrated good  understanding of the education provided.      See EMR under Patient Instructions for exercises provided 6/29/2020     Assessment   Kylie tolerated PT session well. Pt reports that she is not ambulating at home due to bilateral knee pain. PT continues to address with strengthening exercises. Pt reports a "slight improvement" in R hip pain since new stretches were initiated. MD visit to assess knee pain scheduled for 7/7/2020. Pt can benefit from continued skilled PT to address strength and increased joint stability and stretching to decrease pain for improved mobility.       .Kylie is progressing well towards her goals.   Pt prognosis is Good.      Pt will continue to benefit from skilled outpatient physical therapy to address the deficits listed in the problem list box on initial evaluation, provide pt/family education and to maximize pt's level of independence in the home and community environment.      Pt's spiritual, cultural and educational needs considered and pt agreeable to plan of care and goals.     Anticipated barriers to physical therapy: none     Goals: formal status as of 6/15/2020   Short Term Goals:  4 weeks;  1.Report " decreased in pain at worst less than <   / =  4  /10  to increase tolerance for functional activities. ongoing  2. Pt to improve B knee range of motion by 8 degrees to allow for improved functional mobility to allow for improvement in IADLs. MET  3. Increased B knee  MMT 1/2  to increase tolerance for ADL and work activities. MET  4. Pt to report an ability to perform stand pivot transfer into her sofa. MET   5. Pt to tolerate HEP to improve ROM and independence with ADL's. MET  6. New 1/15/2020: pt will tolerate 6 minute walk test to demonstrate improved gait tolerance.- 2 min walk with RW inconsistent= 72 ft, 1 min of gait tolerated     Long Term Goals:  8 weeks,   1. Report decreased in pain at worse less than  <   / =  2  /10  to increase tolerance for functional mobility. ongoing  2. Pt to improve B knee range of motion by 8 degrees to allow for improved functional mobility to allow for improvement in IADLs.  MET  3. Increased B knee MMT 1 grade  to increase tolerance for ADL and work activities. MET  4. Pt will scores report at CJ level (20-40% impaired) on LEFS  to demonstrate increase in LE function with every day tasks. progressing  5. Pt to be Independent with HEP to improve ROM and independence with ADL's. MET  6. New goal 8/14/2019: Pt will decrease score on TUG to </= 30 seconds in order to make her ambulation more functional. met 12/18/19              Revised 12/18/19:  pt will perform TUG in <20 sec to demonstrate decreased fall risk and improvement in mobility for household distance to decrease w/c use. Met 1/15/20, no longer met 6/15/2020- 29.4 sec with RW  7. New 12/19/19: pt will demonstrate improved general strength for being able to stand from various surfaces by performing 9 sit<>stands from chair without armrests.- improved- 13x with use of armrests  8. New 12/19/19: pt will perform static standing with intermittent UE support for 5 min to improve ability to perform some house hold duties  (I.e. wiping a counter)- declined ~4 sec without UE support, SBA        Plan     Increase resistance for short arc quads to 2#  Limit standing as needed depending on knee pain and edema. Apply ice at the end of sessions to address knee pain    I certify that I was present in the room directing the student in service delivery and guiding them using my skilled judgment. As the co-signing therapist I have reviewed the students documentation and am responsible for the treatment, assessment, and plan.        ELIZABETH Henson, SPTA  7/6/2020

## 2020-07-08 ENCOUNTER — DOCUMENTATION ONLY (OUTPATIENT)
Dept: REHABILITATION | Facility: HOSPITAL | Age: 60
End: 2020-07-08

## 2020-07-22 NOTE — PLAN OF CARE
Physical Therapy Daily Treatment Note      Name: Kylie Aleman  Clinic Number: [id number]     Therapy Diagnosis:        Encounter Diagnoses   Name Primary?    Bilateral chronic knee pain      Bilateral foot pain      Unsteady gait      Weakness of both lower extremities        Physician: Mannie Hutchison MD     Visit Date: 7/22/2020      Physician Orders: PT Eval and Treat  Medical Diagnosis: M79.604 (ICD-10-CM) - Pain in right leg M79.605 (ICD-10-CM) - Pain in left leg R26.81 (ICD-10-CM) - Unsteadiness on feet     Evaluation Date: 5/9/19  Authorization Period Expiration: 9/2/2020  POC: 5/18/2020 to 7/17/2020  POC extended: 7/18/2020 to 8/14/2020     Visit #/Visits authorized: 7/12- KX MODIFIER  (total visits 2020 as of 6/1/20= 17)  (Total Visits 2019: 43)     Time In: 0933  Time Out: 1027  Total Billable Time: 44 minutes   Total treatment time: 54 min     Precautions: Standard        Subjective      Pt reports: The Dr says that I have something in my knee that is causing me pain. Pt reports that the cysts are in the anterior knee, near patella. Pt reports receiving 2 Cortizone injections in each knee last week. This did not help the pain. Very little ambulation at home due to knee pain  She was compliant with home exercise program for stretching and sitting exercises  Response to previous treatment: ongoing  Functional change: ongoing      Pain: 5/10 prior to session,   Location: bilateral knees and feet     Objective     Bold= new or progression    Target heart rate: 220-62= 158 bpm x 80%= 126 bpm     Kylie received therapeutic exercises to develop strength and endurance for 44 minutes including:     Performed in sitting:    RLE 5/18/20 RLE 6/15/20 RLE 7/20 LLE 5/18/20 LLE 6/15/20 LLE 7/20   Hip Flexion: 4+/5 4+/5 5/5 5/5 5/5 5/5   Hip Extension:  NT NT NT NT NT NT   Hip Abduction: NT 4+/5 5/5 NT 4+/5 5/5   Hip Adduction: NT NT NT NT NT NT   Knee Extension: 4+/5 5/5 5/5 5/5 5/5 5/5   Knee Flexion: 5/5 NT  5/5 5/5 NT 5/5   Ankle Dorsiflexion: 4/5 5/5 NT 4+/5 5/5 NT   Ankle Plantarflexion: 3+/5 4/5 4+/5 4-/5 4/5 4-/5           12/18/19 1/15/20 2/19/20 5/18/2020 6/15/2020 7/22/20   TUG 28.5 sec w/ RW 19 sec w/RW 21.5 sec w/RW 21.1sec with  29.4 sec unable   30 sec sit<>stand 14x with arms on armrests 11x arms on armrests 13x arms on armrests (w/c) 11x arms on armrests 13x with arms on armrest 12x with UE on armrests   6 min walk NT 2 min tolerated with RW= 228 ft 2 min walk with RW= 188 ft 45 sec tolerated w/RW= 88ft 1 min tolerated w/RW= 72ft unable     Nustep 4 min x 2  lvl 3 to improve muscular and CV endurance  Vitals prior to stepper (after testing): O2=97%, HR= 122 bpm  After 4 min: O2=97%, HR= 124 bpm  After resting x 1-2 min: O2= 98%, HR= 111bpm       Supine: bridging 2 x 10   Quad sets with double towel roll 2 x 10   Heel slides 2 x 10   short arc quads 2# 2 x 10, 3 sec hold    Hip abduction with sliding board 20x       Stand pivot transfers from w/c during session with BUE support and mod I                          Ice applied to both knees at conclusion of session x 10 min       Home Exercises Provided and Patient Education Provided      Education provided:   - continue with HEP, stretching, and applying ice to knees to address pain  - d/c from PT will occur if knee pain remains unchanged     Written Home Exercises Provided: Patient instructed to cont prior HEP.  Exercises were reviewed and Kylie was able to demonstrate them prior to the end of the session.  Kylie demonstrated good  understanding of the education provided.      See EMR under Patient Instructions for exercises provided 6/29/2020     Assessment   Assessment period: 6/17/2020 to 7/22/2020. Pt participated in 6 PT sessions since last reassessment. Multiple cancellations due to increased knee edema.   Kylie tolerates PT sessions fairly. Pt reports that she is not ambulating at home due to bilateral knee pain. This has been a progressively  "worsening condition for >1 month. Pt has been unable to ambulate at PT sessions due to reported knee pain. Per pt, MD informed her that she has "cysts" in the anterior portion of her knees. PT palpated a soft mass medial to the inferior angle of each patella. Pt presents with moderate edema in both knees. LE strength assessed in sitting reveals good strength. Pt's endurance for cardiovascular activity has remained ~same. Pt requires a rest break after ~4 min of cardiovascular activity. Pt was able to tolerate increased resistance for terminal knee extension today. Poor knee extension active range of motion/ passive range of motion continues. Pt reports good complaince with strengthening exercises, but only intermittent compliance with use of ice to address swelling. Pt's progress is greatly limited by knee pain. Pt informed that if knee pain cannot be managed medically, then a d/c from PT is indicated due to a plateau in progress with mobility. Pt has a follow up MD appointment today, 2 remaining PT visits will continue as scheduled to determine if medical management is helping with pain.         .Kylie is progressing well towards her goals.   Pt prognosis is Good.      Pt will continue to benefit from skilled outpatient physical therapy to address the deficits listed in the problem list box on initial evaluation, provide pt/family education and to maximize pt's level of independence in the home and community environment.      Pt's spiritual, cultural and educational needs considered and pt agreeable to plan of care and goals.     Anticipated barriers to physical therapy: none     Goals: formal status as of 6/15/2020   Short Term Goals:  4 weeks;  1.Report decreased in pain at worst less than <   / =  4  /10  to increase tolerance for functional activities. ongoing  2. Pt to improve B knee range of motion by 8 degrees to allow for improved functional mobility to allow for improvement in IADLs. MET  3. Increased B " knee  MMT 1/2  to increase tolerance for ADL and work activities. MET  4. Pt to report an ability to perform stand pivot transfer into her sofa. MET   5. Pt to tolerate HEP to improve ROM and independence with ADL's. MET  6. New 1/15/2020: pt will tolerate 6 minute walk test to demonstrate improved gait tolerance.- 2 min walk with RW inconsistent= 72 ft, 1 min of gait tolerated     Long Term Goals:  8 weeks,   1. Report decreased in pain at worse less than  <   / =  2  /10  to increase tolerance for functional mobility. ongoing  2. Pt to improve B knee range of motion by 8 degrees to allow for improved functional mobility to allow for improvement in IADLs.  MET  3. Increased B knee MMT 1 grade  to increase tolerance for ADL and work activities. MET  4. Pt will scores report at CJ level (20-40% impaired) on LEFS  to demonstrate increase in LE function with every day tasks. progressing  5. Pt to be Independent with HEP to improve ROM and independence with ADL's. MET  6. New goal 8/14/2019: Pt will decrease score on TUG to </= 30 seconds in order to make her ambulation more functional. met 12/18/19              Revised 12/18/19:  pt will perform TUG in <20 sec to demonstrate decreased fall risk and improvement in mobility for household distance to decrease w/c use. Met 1/15/20, no longer met 6/15/2020- 29.4 sec with RW  7. New 12/19/19: pt will demonstrate improved general strength for being able to stand from various surfaces by performing 9 sit<>stands from chair without armrests.- improved- 13x with use of armrests  8. New 12/19/19: pt will perform static standing with intermittent UE support for 5 min to improve ability to perform some house hold duties (I.e. wiping a counter)- declined ~4 sec without UE support, SBA        Plan   POC extended x 4 weeks (7/18/2020 to 8/14/2020) to determine if a change in knee pain can be achieved with medical management.     Limit standing as needed depending on knee pain and  edema. Apply ice at the end of sessions to address knee pain       Yelena Ferrer, DPT, NCS, CBIS   7/22/2020

## 2020-07-28 NOTE — PROGRESS NOTES
PT/PTA STAFFING      Name: Kylie Aleman  [facility] Number: [id number]    Date of staffin2020      DME/ orders needed: No    Changes to POC: attempt more gait as tolerated, pt may be d/c if pt is unable to progress with gait.      Continue with POC: Yes, see above    Yelena Ferrer,DPT  Vaibhav Tejada, PTA  2020

## 2020-07-28 NOTE — PROGRESS NOTES
Physical Therapy Daily Treatment Note      Name: Kylie Aleman  Clinic Number: [id number]     Therapy Diagnosis:        Encounter Diagnoses   Name Primary?    Bilateral chronic knee pain      Bilateral foot pain      Unsteady gait      Weakness of both lower extremities        Physician: Mannie Hutchison MD     Visit Date: 7/28/2020      Physician Orders: PT Eval and Treat  Medical Diagnosis: M79.604 (ICD-10-CM) - Pain in right leg M79.605 (ICD-10-CM) - Pain in left leg R26.81 (ICD-10-CM) - Unsteadiness on feet     Evaluation Date: 5/9/19  Authorization Period Expiration: 6/18/2020  New POC: 5/18/2020 to 7/17/2020     Visit #/Visits authorized: 7/12- KX MODIFIER  (total visits 2020 as of 6/1/20= 17)  (Total Visits 2019: 43)     Time In: 0925 (10mins late)  Time Out: 1005 40  Total Billable Time: 45 minutes   Total treatment time: 55 min     Precautions: Standard        Subjective      Pt reports: She is feeling fatigued and like her muscle are weak.  She was compliant with home exercise program for stretching but not using ice on knees at home.  Response to previous treatment: R hip feels slightly better  Functional change: ongoing      Pain: 4/10 prior to session, sore knees, but improved pain at conclusion of session  Location: bilateral knees and feet     Objective     Bold= new or progression    Target heart rate: 220-62= 158 bpm x 80%= 126 bpm     Kylie received therapeutic exercises to develop strength and endurance for 45 minutes including:     Nustep 8 min   lvl 3 to improve muscular and CV endurance    Standing at RW lateral weight shift 3x45 seconds  Mini squats at RW 3x10  Gait 10ft x2 with RW SBA        NP -Supine: bridging 2 x 10   Quad sets with double towel roll 2 x 10   single knee to chest with towel 5 x 10 sec- 2 sets   short arc quads 1# 2 x 10, 3 sec hold - **progress next session              Hip adduction with ball 30x   Hip abduction Blue thera band 30x   Hip abduction with sliding  board 20x     NP- Sitting: hamstring stretch with belt 2 x 30 sec   Hip ER stretch 2 x 30 sec                            Ice applied to both knees at conclusion of session x 10 min    Attempted gait, pt performed STS and sat back down secondary to R knee pain.     Home Exercises Provided and Patient Education Provided      Education provided:   - continue with HEP and stretching  - educated on eating >1 meal/day, and/or adding nutritious snacks such as fruits, vegetables, or nuts for energy. Also discussed ensuring that meals contain good proteins such as chicken for improved energy.      Written Home Exercises Provided: Patient instructed to cont prior HEP.  Exercises were reviewed and Kylie was able to demonstrate them prior to the end of the session.  Kylie demonstrated good  understanding of the education provided.      See EMR under Patient Instructions for exercises provided 6/29/2020     Assessment   Pt tolerated session well today. Pt has had decreased functional mobility and tolerance since return after break from Covid restrictions. Today pt was challenged with all standing exercises apart from Nustep. Pt was able to completed all exercises without significant increases in knee pain to required pt to sit. Pt was able to ambulate 10ft x2 with decreased step length and step height, significant weight distribution through BUEs. Pt reported feeling better after therapy with more confidence to progress.      .Kylie is progressing well towards her goals.   Pt prognosis is Good.      Pt will continue to benefit from skilled outpatient physical therapy to address the deficits listed in the problem list box on initial evaluation, provide pt/family education and to maximize pt's level of independence in the home and community environment.      Pt's spiritual, cultural and educational needs considered and pt agreeable to plan of care and goals.     Anticipated barriers to physical therapy: none     Goals:  formal status as of 6/15/2020   Short Term Goals:  4 weeks;  1.Report decreased in pain at worst less than <   / =  4  /10  to increase tolerance for functional activities. ongoing  2. Pt to improve B knee range of motion by 8 degrees to allow for improved functional mobility to allow for improvement in IADLs. MET  3. Increased B knee  MMT 1/2  to increase tolerance for ADL and work activities. MET  4. Pt to report an ability to perform stand pivot transfer into her sofa. MET   5. Pt to tolerate HEP to improve ROM and independence with ADL's. MET  6. New 1/15/2020: pt will tolerate 6 minute walk test to demonstrate improved gait tolerance.- 2 min walk with RW inconsistent= 72 ft, 1 min of gait tolerated     Long Term Goals:  8 weeks,   1. Report decreased in pain at worse less than  <   / =  2  /10  to increase tolerance for functional mobility. ongoing  2. Pt to improve B knee range of motion by 8 degrees to allow for improved functional mobility to allow for improvement in IADLs.  MET  3. Increased B knee MMT 1 grade  to increase tolerance for ADL and work activities. MET  4. Pt will scores report at CJ level (20-40% impaired) on LEFS  to demonstrate increase in LE function with every day tasks. progressing  5. Pt to be Independent with HEP to improve ROM and independence with ADL's. MET  6. New goal 8/14/2019: Pt will decrease score on TUG to </= 30 seconds in order to make her ambulation more functional. met 12/18/19              Revised 12/18/19:  pt will perform TUG in <20 sec to demonstrate decreased fall risk and improvement in mobility for household distance to decrease w/c use. Met 1/15/20, no longer met 6/15/2020- 29.4 sec with RW  7. New 12/19/19: pt will demonstrate improved general strength for being able to stand from various surfaces by performing 9 sit<>stands from chair without armrests.- improved- 13x with use of armrests  8. New 12/19/19: pt will perform static standing with intermittent UE  support for 5 min to improve ability to perform some house hold duties (I.e. wiping a counter)- declined ~4 sec without UE support, SBA        Plan     Increase resistance for short arc quads to 2#  Limit standing as needed depending on knee pain and edema. Apply ice at the end of sessions to address knee pain    I certify that I was present in the room directing the student in service delivery and guiding them using my skilled judgment. As the co-signing therapist I have reviewed the students documentation and am responsible for the treatment, assessment, and plan.        Vaibhav Tejada,PTA  7/28/2020

## 2020-07-29 NOTE — PROGRESS NOTES
"  Physical Therapy Daily Treatment Note      Name: Kylie Aleman  Clinic Number: [id number]     Therapy Diagnosis:        Encounter Diagnoses   Name Primary?    Bilateral chronic knee pain      Bilateral foot pain      Unsteady gait      Weakness of both lower extremities        Physician: Mannie Hutchison MD     Visit Date: 7/29/2020      Physician Orders: PT Eval and Treat  Medical Diagnosis: M79.604 (ICD-10-CM) - Pain in right leg M79.605 (ICD-10-CM) - Pain in left leg R26.81 (ICD-10-CM) - Unsteadiness on feet     Evaluation Date: 5/9/19  Authorization Period Expiration: 6/18/2020  POC extended: 7/18/2020 to 8/14/2020     Visit #/Visits authorized: 7/12- KX MODIFIER  (total visits 2020 as of 6/1/20= 17)  (Total Visits 2019: 43)     Time In: 0930  Time Out: 1016  Total Billable Time: 44 minutes   Total treatment time: 46 min     Precautions: Standard        Subjective      Pt reports: feels like my legs feel heavy. Using ice at home 1x/day, now takes Tramodol for knee pain  She was compliant with home exercise program   Response to previous treatment: felt good  Functional change: ongoing      Pain: 4/10 prior to session, sore knees, but improved pain at conclusion of session  Location: bilateral knees and feet     Objective     Bold= new or progression    Target heart rate: 220-62= 158 bpm x 80%= 126 bpm     Kylie received therapeutic exercises to develop strength and endurance for 46 minutes including:     Nustep 8 min lvl 3 to improve muscular and CV endurance  Heart rate after stepper= 120 bpm,     Standing at RW lateral weight shift 3 x 1 min  Mini squats at RW to 23" surface 20x + 10x    Standing with RW: foot taps on 4" step 10x- pain reported on LLE   Heel raises 20x + 10x    HR mid session= 136 bpm    Gait 10ft x2 with RW SBA      Sitting: Long arc quads 3#- 30x   hamstring stretch with belt 2 x 30 sec   Hip ER stretch 2 x 30 sec     HR at conclusion of session= 111 bpm                       "        Home Exercises Provided and Patient Education Provided      Education provided:   - continue with HEP, stretching, and ice       Written Home Exercises Provided: Patient instructed to cont prior HEP.  Exercises were reviewed and Kylie was able to demonstrate them prior to the end of the session.  Kylie demonstrated good  understanding of the education provided.      See EMR under Patient Instructions for exercises provided 6/29/2020     Assessment   Pt tolerated session well today. Pt tolerated increased standing activities today. Pt reported lateral thigh pain/cramping with L single leg stance. Pt demonstrates poor foot clearance ECTOR during gait with rolling walker. Pt did not require ice to knees at conclusion of session. Pt demonstrated appropriate cardiovascular response during exercises. Due to increased standing tolerance and knee pain decreasing to tolerable, pt can benefit from continued skilled PT to address weakness to progress towards goals.       .Kylie is progressing well towards her goals.   Pt prognosis is Good.      Pt will continue to benefit from skilled outpatient physical therapy to address the deficits listed in the problem list box on initial evaluation, provide pt/family education and to maximize pt's level of independence in the home and community environment.      Pt's spiritual, cultural and educational needs considered and pt agreeable to plan of care and goals.     Anticipated barriers to physical therapy: none     Goals: formal status as of 6/15/2020   Short Term Goals:  4 weeks;  1.Report decreased in pain at worst less than <   / =  4  /10  to increase tolerance for functional activities. ongoing  2. Pt to improve B knee range of motion by 8 degrees to allow for improved functional mobility to allow for improvement in IADLs. MET  3. Increased B knee  MMT 1/2  to increase tolerance for ADL and work activities. MET  4. Pt to report an ability to perform stand pivot transfer  into her sofa. MET   5. Pt to tolerate HEP to improve ROM and independence with ADL's. MET  6. New 1/15/2020: pt will tolerate 6 minute walk test to demonstrate improved gait tolerance.- 2 min walk with RW inconsistent= 72 ft, 1 min of gait tolerated     Long Term Goals:  8 weeks,   1. Report decreased in pain at worse less than  <   / =  2  /10  to increase tolerance for functional mobility. ongoing  2. Pt to improve B knee range of motion by 8 degrees to allow for improved functional mobility to allow for improvement in IADLs.  MET  3. Increased B knee MMT 1 grade  to increase tolerance for ADL and work activities. MET  4. Pt will scores report at CJ level (20-40% impaired) on LEFS  to demonstrate increase in LE function with every day tasks. progressing  5. Pt to be Independent with HEP to improve ROM and independence with ADL's. MET  6. New goal 8/14/2019: Pt will decrease score on TUG to </= 30 seconds in order to make her ambulation more functional. met 12/18/19              Revised 12/18/19:  pt will perform TUG in <20 sec to demonstrate decreased fall risk and improvement in mobility for household distance to decrease w/c use. Met 1/15/20, no longer met 6/15/2020- 29.4 sec with RW  7. New 12/19/19: pt will demonstrate improved general strength for being able to stand from various surfaces by performing 9 sit<>stands from chair without armrests.- improved- 13x with use of armrests  8. New 12/19/19: pt will perform static standing with intermittent UE support for 5 min to improve ability to perform some house hold duties (I.e. wiping a counter)- declined ~4 sec without UE support, SBA        Plan   Address standing and gait as tolerated       Yelena Ferrer, DPT, NCS, CBIS  7/29/2020

## 2020-08-03 NOTE — PROGRESS NOTES
"  Physical Therapy Daily Treatment Note      Name: Kylie Aleman  Clinic Number: [id number]     Therapy Diagnosis:        Encounter Diagnoses   Name Primary?    Bilateral chronic knee pain      Bilateral foot pain      Unsteady gait      Weakness of both lower extremities        Physician: Mannie Hutchison MD     Visit Date: 8/3/2020      Physician Orders: PT Eval and Treat  Medical Diagnosis: M79.604 (ICD-10-CM) - Pain in right leg M79.605 (ICD-10-CM) - Pain in left leg R26.81 (ICD-10-CM) - Unsteadiness on feet     Evaluation Date: 5/9/19  Authorization Period Expiration: 6/18/2020  POC extended: 7/18/2020 to 8/14/2020     Visit #/Visits authorized: 8/12- KX MODIFIER  (total visits 2020 as of 6/1/20= 17)  (Total Visits 2019: 43)     Time In: 1025  Time Out: 1110  Total Billable Time: 44 minutes   Total treatment time: 46 min     Precautions: Standard        Subjective      Pt reports: my knees are giving me fits, both of them.  She was compliant with home exercise program    Response to previous treatment: felt good  Functional change: ongoing      Pain: 5/10 prior to session, sore knees, but improved pain at conclusion of session  Location: bilateral knees and feet     Objective     Bold= new or progression    Target heart rate: 220-62= 158 bpm x 80%= 126 bpm     Kylie received therapeutic exercises to develop strength and endurance for 46 minutes including:     Nustep 8 min lvl 3 to improve muscular and CV endurance  Heart rate after stepper= 120 bpm,     Standing at RW lateral weight shift 3 x 1 min  Mini squats at RW to 23" surface 30x    Standing with RW: foot taps on 4" step 10x- pain reported on LLE-NP   Heel raises 15x + 15x              Standing Marches 2x15    HR mid session= 136 bpm    Gait 20ft & 60ft with RW     Sitting: Long arc quads 3#- 30x   hamstring stretch with belt 2 x 30 sec   Hip ER stretch 2 x 30 sec- NP     HR at conclusion of session= 111 bpm                              Home " Exercises Provided and Patient Education Provided      Education provided:   - continue with HEP, stretching, and ice       Written Home Exercises Provided: Patient instructed to cont prior HEP.  Exercises were reviewed and Kylie was able to demonstrate them prior to the end of the session.  Kylie demonstrated good  understanding of the education provided.      See EMR under Patient Instructions for exercises provided 6/29/2020     Assessment   Pt tolerated session well today. While pt reported high bilateral knee pain she was able to perform increased standing exercises today. Pt reported less pain at conclusion of session. Pt still relies on significant support from BUEs during standing exercises. Pt ambulates with decreased step width and height.     .Kylie is progressing well towards her goals.   Pt prognosis is Good.      Pt will continue to benefit from skilled outpatient physical therapy to address the deficits listed in the problem list box on initial evaluation, provide pt/family education and to maximize pt's level of independence in the home and community environment.      Pt's spiritual, cultural and educational needs considered and pt agreeable to plan of care and goals.     Anticipated barriers to physical therapy: none     Goals: formal status as of 6/15/2020   Short Term Goals:  4 weeks;  1.Report decreased in pain at worst less than <   / =  4  /10  to increase tolerance for functional activities. ongoing  2. Pt to improve B knee range of motion by 8 degrees to allow for improved functional mobility to allow for improvement in IADLs. MET  3. Increased B knee  MMT 1/2  to increase tolerance for ADL and work activities. MET  4. Pt to report an ability to perform stand pivot transfer into her sofa. MET   5. Pt to tolerate HEP to improve ROM and independence with ADL's. MET  6. New 1/15/2020: pt will tolerate 6 minute walk test to demonstrate improved gait tolerance.- 2 min walk with RW  inconsistent= 72 ft, 1 min of gait tolerated     Long Term Goals:  8 weeks,   1. Report decreased in pain at worse less than  <   / =  2  /10  to increase tolerance for functional mobility. ongoing  2. Pt to improve B knee range of motion by 8 degrees to allow for improved functional mobility to allow for improvement in IADLs.  MET  3. Increased B knee MMT 1 grade  to increase tolerance for ADL and work activities. MET  4. Pt will scores report at CJ level (20-40% impaired) on LEFS  to demonstrate increase in LE function with every day tasks. progressing  5. Pt to be Independent with HEP to improve ROM and independence with ADL's. MET  6. New goal 8/14/2019: Pt will decrease score on TUG to </= 30 seconds in order to make her ambulation more functional. met 12/18/19              Revised 12/18/19:  pt will perform TUG in <20 sec to demonstrate decreased fall risk and improvement in mobility for household distance to decrease w/c use. Met 1/15/20, no longer met 6/15/2020- 29.4 sec with RW  7. New 12/19/19: pt will demonstrate improved general strength for being able to stand from various surfaces by performing 9 sit<>stands from chair without armrests.- improved- 13x with use of armrests  8. New 12/19/19: pt will perform static standing with intermittent UE support for 5 min to improve ability to perform some house hold duties (I.e. wiping a counter)- declined ~4 sec without UE support, SBA        Plan   Address standing and gait as tolerated       Vaibhav Tejada  8/3/2020

## 2020-08-13 NOTE — PROGRESS NOTES
"  Physical Therapy Daily Treatment Note      Name: Kylie Aleman  Clinic Number: [id number]     Therapy Diagnosis:        Encounter Diagnoses   Name Primary?    Bilateral chronic knee pain      Bilateral foot pain      Unsteady gait      Weakness of both lower extremities        Physician: Mannie Hutchison MD     Visit Date: 8/13/2020      Physician Orders: PT Eval and Treat  Medical Diagnosis: M79.604 (ICD-10-CM) - Pain in right leg M79.605 (ICD-10-CM) - Pain in left leg R26.81 (ICD-10-CM) - Unsteadiness on feet     Evaluation Date: 5/9/19  Authorization Period Expiration: 6/18/2020  POC extended: 7/18/2020 to 8/14/2020     Visit #/Visits authorized:11/12- KX MODIFIER  (total visits 2020 as of 6/1/20= 17)  (Total Visits 2019: 43)     Time In: 0818  Time Out: 0902  Total Billable Time: 44 minutes      Precautions: Standard        Subjective      Pt reports: my knees are giving me the blues, it had me up last night  She was compliant with home exercise program    Response to previous treatment: felt good  Functional change: ongoing      Pain: 6/10 prior to session, sore knees, 5/10 at conclusion of session  Location: bilateral knees and feet     Objective     Bold= new or progression    Target heart rate: 220-62= 158 bpm x 80%= 126 bpm     Kylie received therapeutic exercises to develop strength and endurance for 44 minutes including:     Nustep 8 min lvl 3 to improve muscular and CV endurance  Heart rate after stepper= 131 bpm,     Standing at RW lateral weight shift 3 x 1 min    Standing with RW: foot taps on 4" step 10x- pain reported on LLE-NP   Heel raises 15x + 15x              Standing Marches 3x20    HR mid session= 108 bpm    Gait 20ft + 30ft + 20 ft with RW and mod I- forward flexion at hips with flexed knees     Sitting: Long arc quads 3#- 40x      HR at conclusion of session= 111 bpm                              Home Exercises Provided and Patient Education Provided      Education provided:   - " continue with HEP, stretching, and ice       Written Home Exercises Provided: Patient instructed to cont prior HEP.  Exercises were reviewed and Kylie was able to demonstrate them prior to the end of the session.  Kylie demonstrated good  understanding of the education provided.      See EMR under Patient Instructions for exercises provided 6/29/2020     Assessment   Pt tolerated session fairly well. Pt reports that bilateral knee pain has been increased, she is still taking Tramadol for pain. Despite knee pain, pt was able to tolerate some standing exercises and gait. Pt was only able to walk up to 30 ft at a time today. Gait distance was limited due to knee pain. Pt did not participate in mini squats due to increased pain. Pt stood with bilateral knee and hip flexion today. Pt was able to tolerate increased repetitions for Long arc quads and marching in standing. Pt can benefit from continued skilled PT to address gait ability to decrease w/c use.      .Kylie is progressing well towards her goals.   Pt prognosis is Good.      Pt will continue to benefit from skilled outpatient physical therapy to address the deficits listed in the problem list box on initial evaluation, provide pt/family education and to maximize pt's level of independence in the home and community environment.      Pt's spiritual, cultural and educational needs considered and pt agreeable to plan of care and goals.     Anticipated barriers to physical therapy: none     Goals: formal status as of 6/15/2020   Short Term Goals:  4 weeks;  1.Report decreased in pain at worst less than <   / =  4  /10  to increase tolerance for functional activities. ongoing  2. Pt to improve B knee range of motion by 8 degrees to allow for improved functional mobility to allow for improvement in IADLs. MET  3. Increased B knee  MMT 1/2  to increase tolerance for ADL and work activities. MET  4. Pt to report an ability to perform stand pivot transfer into her  sofa. MET   5. Pt to tolerate HEP to improve ROM and independence with ADL's. MET  6. New 1/15/2020: pt will tolerate 6 minute walk test to demonstrate improved gait tolerance.- 2 min walk with RW inconsistent= 72 ft, 1 min of gait tolerated     Long Term Goals:  8 weeks,   1. Report decreased in pain at worse less than  <   / =  2  /10  to increase tolerance for functional mobility. ongoing  2. Pt to improve B knee range of motion by 8 degrees to allow for improved functional mobility to allow for improvement in IADLs.  MET  3. Increased B knee MMT 1 grade  to increase tolerance for ADL and work activities. MET  4. Pt will scores report at CJ level (20-40% impaired) on LEFS  to demonstrate increase in LE function with every day tasks. progressing  5. Pt to be Independent with HEP to improve ROM and independence with ADL's. MET  6. New goal 8/14/2019: Pt will decrease score on TUG to </= 30 seconds in order to make her ambulation more functional. met 12/18/19              Revised 12/18/19:  pt will perform TUG in <20 sec to demonstrate decreased fall risk and improvement in mobility for household distance to decrease w/c use. Met 1/15/20, no longer met 6/15/2020- 29.4 sec with RW  7. New 12/19/19: pt will demonstrate improved general strength for being able to stand from various surfaces by performing 9 sit<>stands from chair without armrests.- improved- 13x with use of armrests  8. New 12/19/19: pt will perform static standing with intermittent UE support for 5 min to improve ability to perform some house hold duties (I.e. wiping a counter)- declined ~4 sec without UE support, SBA        Plan   Reassess next visit       Yelena Ferrer  8/13/2020

## 2020-08-16 NOTE — PLAN OF CARE
Physical Therapy Daily Treatment Note      Name: Kylie Aleman  Clinic Number: [id number]     Therapy Diagnosis:        Encounter Diagnoses   Name Primary?    Bilateral chronic knee pain      Bilateral foot pain      Unsteady gait      Weakness of both lower extremities        Physician: Mannie Hutchison MD     Visit Date: 2020      Physician Orders: PT Eval and Treat  Medical Diagnosis: M79.604 (ICD-10-CM) - Pain in right leg M79.605 (ICD-10-CM) - Pain in left leg R26.81 (ICD-10-CM) - Unsteadiness on feet     Evaluation Date: 19  Authorization Period Expiration: 2020  POC: 2020 to 2020  New POC: 8/15/2020 to 2020     Visit #/Visits authorized:- KX MODIFIER  (total visits  as of 20= 17)  (Total Visits 2019: 43)     Time In: 0930  Time Out: 1016  Total Billable Time: 46 minutes      Precautions: Standard        Subjective      Pt reports: my knees are stiff  She was compliant with home exercise program only for Long arc quads    Response to previous treatment: felt good   Functional change: fluctuating     Pain: 5/10 prior to session, sore knees, 5/10 at conclusion of session  Location: bilateral knees- feels like a cramping  Objective     Bold= new or progression    Target heart rate: 220-62= 158 bpm x 80%= 126 bpm     Kylie received therapeutic exercises to develop strength and endurance for 46 minutes includin/18/19 1/15/20 2/19/20 2020 6/15/2020 7/22/20 8/14/20   TUG 28.5 sec w/ RW 19 sec w/RW 21.5 sec w/RW 21.1sec with  29.4 sec unable unable   30 sec sit<>stand 14x with arms on armrests 11x arms on armrests 13x arms on armrests (w/c) 11x arms on armrests 13x with arms on armrest 12x with UE on armrests 15x with BUE on armrests   6 min walk NT 2 min tolerated with RW= 228 ft 2 min walk with RW= 188 ft 45 sec tolerated w/RW= 88ft 1 min tolerated w/RW= 72ft unable unable     TUG with RW= unable 2 attempts prior to any exercises     Nustep 8 min lvl 3  to improve muscular and CV endurance  Heart rate after stepper= 137 bpm,     Standing with RW:   Heel raises 15x + 15x- pt sat between sets              Standing Marches 3x20     Parallel bar: hip extension- pt unable to complete 1 set due to L quad cramping    hamstring curls x 10    HR mid session= 118 bpm    Gait 20ft x 3t with RW and mod I- forward flexion at hips with flexed knees     Sitting: Long arc quads 3#- 40x      HR at conclusion of session (after gait trials)= 138 bpm                              Home Exercises Provided and Patient Education Provided      Education provided:   - continue with HEP, stretching, and ice  - HEP should include heel slides and hip abduction in addition to Long arc quads  - should practice more standing and gait at home- stiffness is arising from immobility       Written Home Exercises Provided: Patient instructed to cont prior HEP.  Exercises were reviewed and Kylie was able to demonstrate them prior to the end of the session.  [place] demonstrated good  understanding of the education provided.      See EMR under Patient Instructions for exercises provided 6/29/2020     Assessment   Assessment period: 7/28/2020 to 8/14/2020   Pt tolerates PT sessions fairly well. Pt reports an increase in bilateral knee pain and stiffness. Pt's standing tolerance and gait ability. Knee pain and stiffness (L>R) affected outcomes during today's assessment. Pt was not able to perform TUG with RW. This was attempted twice. Pt was able to walk 20 ft 3x with RW at conclusion of session, but this was only in a straight path. This has slightly improved since last assessment (pt was unable to ambulate). Pt stands with increased knee and hip flexion due to stiffness, pain and weakness. PT discussed with pt that she must perform more than just Long arc quads for HEP (to include knee flexion and hip abduction in sitting or supine) to maintain her mobility, flexibility and decrease pain. Pt was  instructed to attempt more standing and gait at home to progress towards goals. POC will be extended x 4 weeks to determine if PT is continuing to benefit pt. Pt can benefit from continued skilled PT to improve gait and standing tolerance.      .Kylie is progressing well towards her goals.   Pt prognosis is Good.      Pt will continue to benefit from skilled outpatient physical therapy to address the deficits listed in the problem list box on initial evaluation, provide pt/family education and to maximize pt's level of independence in the home and community environment.      Pt's spiritual, cultural and educational needs considered and pt agreeable to plan of care and goals.     Anticipated barriers to physical therapy: compliance with entire HEP     Goals: formal status as of 8/14/2020   Short Term Goals:  4 weeks;  1.Report decreased in pain at worst less than <   / =  4  /10  to increase tolerance for functional activities. ongoing  2. Pt to improve B knee range of motion by 8 degrees to allow for improved functional mobility to allow for improvement in IADLs. MET  3. Increased B knee  MMT 1/2  to increase tolerance for ADL and work activities. MET  4. Pt to report an ability to perform stand pivot transfer into her sofa. MET   5. Pt to tolerate HEP to improve ROM and independence with ADL's. MET  6. New 1/15/2020: pt will tolerate 6 minute walk test to demonstrate improved gait tolerance.- 2 min walk with RW declined- pt only tolerates 30 sec of gait with RW     Long Term Goals:  8 weeks,   1. Report decreased in pain at worse less than  <   / =  2  /10  to increase tolerance for functional mobility. ongoing  2. Pt to improve B knee range of motion by 8 degrees to allow for improved functional mobility to allow for improvement in IADLs.  MET  3. Increased B knee MMT 1 grade  to increase tolerance for ADL and work activities. MET  4. Pt will scores report at CJ level (20-40% impaired) on LEFS  to demonstrate  increase in LE function with every day tasks. progressing  5. Pt to be Independent with HEP to improve ROM and independence with ADL's. No longer met- pt only reports 1 exercise  6. New goal 8/14/2019: Pt will decrease score on TUG to </= 30 seconds in order to make her ambulation more functional. met 12/18/19              Revised 12/18/19:  pt will perform TUG in <20 sec to demonstrate decreased fall risk and improvement in mobility for household distance to decrease w/c use. No longer met - pt unable on 8/14/20  7. New 12/19/19: pt will demonstrate improved general strength for being able to stand from various surfaces by performing 9 sit<>stands from chair without armrests.- improved- 15x with use of armrests  8. New 12/19/19: pt will perform static standing with intermittent UE support for 5 min to improve ability to perform some house hold duties (I.e. wiping a counter)- declined ~2 sec without UE support, SBA         Plan   New POC: 8/15/2020 to 9/11/2020- 2x/wee to include therapeutic exercises and gait training.  Continue with POC to focus on LE strengthening, ROM and standing        Yelena Ferrer  8/14/2020

## 2020-08-20 NOTE — PROGRESS NOTES
"  Physical Therapy Daily Treatment Note      Name: Kylie Aleman  Clinic Number: [id number]     Therapy Diagnosis:        Encounter Diagnoses   Name Primary?    Bilateral chronic knee pain      Bilateral foot pain      Unsteady gait      Weakness of both lower extremities        Physician: Mannie Hutchison MD     Visit Date: 8/20/2020      Physician Orders: PT Eval and Treat  Medical Diagnosis: M79.604 (ICD-10-CM) - Pain in right leg M79.605 (ICD-10-CM) - Pain in left leg R26.81 (ICD-10-CM) - Unsteadiness on feet     Evaluation Date: 5/9/19  Authorization Period Expiration: 6/18/2020  New POC: 8/15/2020 to 9/11/2020        Visit #/Visits authorized:12/12- KX MODIFIER  (total visits 2020 as of 6/1/20= 17)  (Total Visits 2019: 43)     Time In: 0933  Time Out: 1017  Total Billable Time: 44 minutes      Precautions: Standard        Subjective      Pt reports: my knees and feet hurting. I've been trying to walk at home  She was compliant with home exercise program    Response to previous treatment: felt good  Functional change: ongoing      Pain: 5/10 prior to session, sore knees, 5/10 at conclusion of session  Location: bilateral knees and feet     Objective     Bold= new or progression    Target heart rate: 220-62= 158 bpm x 80%= 126 bpm     Kylie received therapeutic exercises to develop strength and endurance for 44 minutes including:     Nustep 8 min lvl 3 to improve muscular and CV endurance  Heart rate after stepper= 131 bpm,     Standing at RW lateral weight shift 3 x 1 min -np    STS from 20'' mat c/ blocks to push off 3x10      Standing with RW: foot taps on 4" step 10x- pain reported on LLE   Heel raises 15x + 15x              Standing Marches 3x20    HR mid session= 108 bpm    Gait 20ft + 30ft + 20 ft with RW and mod I- forward flexion at hips with flexed knees     Sitting: Long arc quads 3#- 2x20    HS curl 2x20 GTB    HR at conclusion of session= 115 bpm                              Home Exercises " Provided and Patient Education Provided      Education provided:   - continue with HEP, stretching, and ice       Written Home Exercises Provided: Patient instructed to cont prior HEP.  Exercises were reviewed and Kylie was able to demonstrate them prior to the end of the session.  Kylie demonstrated good  understanding of the education provided.      See EMR under Patient Instructions for exercises provided 6/29/2020     Assessment   Pt tolerated session well. Pt did not require any rest breaks on Nustep endurance today. Pt was ambulate around facility with RW from nustep to mat and mat to out of the clinic, slightly flexed posture, decreased step height, decreased hip/knee flexion. Pt reported less knee pain beginning session and less pain after session. Pt improved with overall functional endurance, able to tolerate increased functional activities in therapy.       .Kylie is progressing well towards her goals.   Pt prognosis is Good.      Pt will continue to benefit from skilled outpatient physical therapy to address the deficits listed in the problem list box on initial evaluation, provide pt/family education and to maximize pt's level of independence in the home and community environment.      Pt's spiritual, cultural and educational needs considered and pt agreeable to plan of care and goals.     Anticipated barriers to physical therapy: none     Goals: formal status as of 8/14/2020   Short Term Goals:  4 weeks;  1.Report decreased in pain at worst less than <   / =  4  /10  to increase tolerance for functional activities. ongoing  2. Pt to improve B knee range of motion by 8 degrees to allow for improved functional mobility to allow for improvement in IADLs. MET  3. Increased B knee  MMT 1/2  to increase tolerance for ADL and work activities. MET  4. Pt to report an ability to perform stand pivot transfer into her sofa. MET   5. Pt to tolerate HEP to improve ROM and independence with ADL's. MET  6. New  1/15/2020: pt will tolerate 6 minute walk test to demonstrate improved gait tolerance.- 2 min walk with RW declined- pt only tolerates 30 sec of gait with RW     Long Term Goals:  8 weeks,   1. Report decreased in pain at worse less than  <   / =  2  /10  to increase tolerance for functional mobility. ongoing  2. Pt to improve B knee range of motion by 8 degrees to allow for improved functional mobility to allow for improvement in IADLs.  MET  3. Increased B knee MMT 1 grade  to increase tolerance for ADL and work activities. MET  4. Pt will scores report at CJ level (20-40% impaired) on LEFS  to demonstrate increase in LE function with every day tasks. progressing  5. Pt to be Independent with HEP to improve ROM and independence with ADL's. No longer met- pt only reports 1 exercise  6. New goal 8/14/2019: Pt will decrease score on TUG to </= 30 seconds in order to make her ambulation more functional. met 12/18/19              Revised 12/18/19:  pt will perform TUG in <20 sec to demonstrate decreased fall risk and improvement in mobility for household distance to decrease w/c use. No longer met - pt unable on 8/14/20  7. New 12/19/19: pt will demonstrate improved general strength for being able to stand from various surfaces by performing 9 sit<>stands from chair without armrests.- improved- 15x with use of armrests  8. New 12/19/19: pt will perform static standing with intermittent UE support for 5 min to improve ability to perform some house hold duties (I.e. wiping a counter)- declined ~2 sec without UE support, SBA           Plan   Cont with functional strengthening and endurance        Vaibhav Tejada  8/20/2020

## 2020-08-27 NOTE — PROGRESS NOTES
"PT/PTA STAFFING      Name: Kylie Aleman  [facility] Number: [id number]    Date of staffin2020      DME/ orders needed: No    Changes to POC: encourage pt to participate in as much standing/ gait as possible. Needs to exercise some to "lossen up" prior to gait for improved participation.      Continue with POC: Yes    Yelena Ferrer,DPT  Vaibhav Tejada, PTA  2020          "

## 2020-08-31 NOTE — FIRST PROVIDER EVALUATION
Emergency Department TeleTRIAGE Encounter Note      CHIEF COMPLAINT    Chief Complaint   Patient presents with    Rash     rash under left breast.Started x several daysRash across abd    Knee Pain     mann knee pain ,chronic arthritis       VITAL SIGNS   Initial Vitals [08/31/20 1644]   BP Pulse Resp Temp SpO2   (!) 106/56 107 20 99 °F (37.2 °C) 97 %      MAP       --            ALLERGIES    Review of patient's allergies indicates:  No Known Allergies    PROVIDER TRIAGE NOTE  Patient is a 60 y.o. female presenting with bilateral knee pain and a rash and pain along the bottom of her breast. No medication for pain. No fever or chills.       ORDERS  Labs Reviewed - No data to display    ED Orders (720h ago, onward)    None            Virtual Visit Note: The provider triage portion of this emergency department evaluation and documentation was performed via [employer], a HIPAA-compliant telemedicine application, in concert with a tele-presenter in the room. A face to face patient evaluation with one of my colleagues will occur once the patient is placed in an emergency department room.      DISCLAIMER: This note was prepared with [employer] voice recognition transcription software. Garbled syntax, mangled pronouns, and other bizarre constructions may be attributed to that software system.

## 2020-08-31 NOTE — PROGRESS NOTES
Missed Visit/Cancellation      Date: 8/31/2020         Canceled Number: 4 (for month of Aug)  No Show Number: 1     Pt initially had visit scheduled for today for 1215  Reason for cancellation: not feeling well  Pt's next scheduled physical therapy visit is not currently scheduled     Yelena Ferrer,XANDER  8/31/2020

## 2020-09-01 NOTE — ED PROVIDER NOTES
Encounter Date: 8/31/2020       History     Chief Complaint   Patient presents with    Rash     rash under left breast.Started x several daysRash across abd    Knee Pain     mann knee pain ,chronic arthritis     HPI     60-year-old female with past medical history of depression, hypertension presents with rash and knee pain x1 week.  Patient reports she has had a rash underneath both her breasts previously that improved after placing a cream on it.  She reports the rash is very itchy and she has tried to use a similar cream she found without any significant improvement and states that she has a similar rash in the fold of her abdomen.  She also reports worsening knee pain in both knees, and has had prior steroid injections which she states did not help her at all.  She reports taking some Motrin as needed for this pain, and also reports some cramping in her legs over the last few days.  She otherwise denies any chest pain, shortness of breath, abdominal pain, nausea/vomiting, lightheadedness/dizziness, headache, or any recent illness.  She denies any further complaints.    Review of patient's allergies indicates:  No Known Allergies  Past Medical History:   Diagnosis Date    Depression     Encounter for blood transfusion     Hypertension      Past Surgical History:   Procedure Laterality Date    HYSTERECTOMY      TOTAL HIP ARTHROPLASTY       Family History   Problem Relation Age of Onset    Hypertension Mother     Hypertension Father     Heart disease Father     Hypertension Sister     Heart disease Sister     Heart disease Maternal Grandmother     Hypertension Maternal Grandmother     Heart disease Maternal Grandfather     Hypertension Maternal Grandfather     Heart disease Paternal Grandmother     Hypertension Paternal Grandmother     Heart disease Paternal Grandfather     Hypertension Paternal Grandfather      Social History     Tobacco Use    Smoking status: Former Smoker    Smokeless  tobacco: Never Used   Substance Use Topics    Alcohol use: No     Comment: QOD last drink on 7/21/2017    Drug use: No     Review of Systems   Constitutional: Negative.    HENT: Negative.    Eyes: Negative.    Respiratory: Negative.    Cardiovascular: Negative.    Gastrointestinal: Negative.    Genitourinary: Negative.    Musculoskeletal: Positive for arthralgias (Knee pain).   Skin: Positive for rash.   Neurological: Negative.        Physical Exam     Initial Vitals [08/31/20 1644]   BP Pulse Resp Temp SpO2   (!) 106/56 107 20 99 °F (37.2 °C) 97 %      MAP       --         Physical Exam    Nursing note and vitals reviewed.  Constitutional: She appears well-developed. She is not diaphoretic. She appears distressed (Mildly).   HENT:   Head: Normocephalic and atraumatic.   Nose: Nose normal.   Eyes: EOM are normal. Pupils are equal, round, and reactive to light.   Neck: Normal range of motion. Neck supple. No JVD present.   Cardiovascular: Normal rate, regular rhythm and normal heart sounds.   No murmur heard.  Pulmonary/Chest: Breath sounds normal. No stridor. No respiratory distress. She has no wheezes. She has no rales.   Abdominal: Soft. Bowel sounds are normal. She exhibits no distension. There is no abdominal tenderness.   Musculoskeletal: Normal range of motion. Tenderness ( mild tenderness over bilateral knees some tenderness on the joint line, no effusions, or skin changes noted full range of motion present.) present. No edema.   Neurological: She is alert and oriented to person, place, and time. No cranial nerve deficit.   Able to move all extremities with equal and symmetric strength.     Skin: Skin is warm and dry. Capillary refill takes less than 2 seconds. Rash ( noted erythematous and scaling rash to intertriginous folds of the left and right breast as well as upper abdomen) noted. No erythema.         ED Course   Procedures  Labs Reviewed   CBC W/ AUTO DIFFERENTIAL - Abnormal; Notable for the  following components:       Result Value    RBC 2.40 (*)     Hemoglobin 9.5 (*)     Hematocrit 27.4 (*)     Mean Corpuscular Volume 114 (*)     Mean Corpuscular Hemoglobin 39.6 (*)     RDW 20.5 (*)     Immature Grans (Abs) 0.05 (*)     All other components within normal limits   COMPREHENSIVE METABOLIC PANEL - Abnormal; Notable for the following components:    Calcium 6.8 (*)     Albumin 3.1 (*)     Alkaline Phosphatase 216 (*)     AST 63 (*)     All other components within normal limits    Narrative:     Calcium critical result(s) called and verbal readback obtained from   Kimberli Asencio by DALI 08/31/2020 21:05   CK          Imaging Results    None                         60-year-old female presenting with rash and acute on chronic bilaterally pain.  Rash noted above consistent with intertriginous candidiasis, will place on nystatin ointment and further rash care discussed with patient.  Cramping noted by patient above, blood work drawn noting a corrected calcium of 7.5, which appears to be near her baseline.  Patient without further complaints upon reassessment, discussed symptomatic treatment for acute on chronic knee pain, including Voltaren topical gel, compression as well as [facility] as she previously has been evaluated there.  Patient also advised to add in calcium and vitamin-D supplements to further help correct her hypocalcemia.  Based on history physical exam and ED workup do not suspect acute renal failure, acute MI/ACS, sepsis, septic arthritis, arrhythmia, fracture, rhabdomyolysis, myositis, cauda equina/conus medullaris, or any further malignant etiology.  Discussed with patient strict ED return precautions as well as follow up with her primary care physician in the next 2-3 days.  All questions answered, patient discharged home improved and stable.                        Clinical Impression:       ICD-10-CM ICD-9-CM   1. Tinea corporis  B35.4 110.5   2. Chronic pain  of both knees  M25.561 719.46    M25.562 338.29    G89.29              ED Disposition Condition    Discharge Stable        ED Prescriptions     Medication Sig Dispense Start Date End Date Auth. Provider    calcium carbonate-vitamin D3 (CALCIUM 500 + D) 500 mg(1,250mg) -400 unit Chew Take 1 tablet by mouth once daily. 100 each 8/31/2020  Esau Newton MD    diclofenac sodium (VOLTAREN) 1 % Gel Apply 2 g topically 4 (four) times daily. 100 g 8/31/2020  Esau Newton MD    nystatin-triamcinolone (MYCOLOG II) cream Apply topically 3 (three) times daily. 30 g 8/31/2020  Esau Newton MD        Follow-up Information     Follow up With Specialties Details Why Contact Info    [facility] Emergency [facility] Go to  If symptoms worsen [address]  [facility] [address] [phone]    Lulu Mcnamara MD Internal Medicine Go in 1 week As needed [address] [facility]  [place] [address] [phone]                                       Esau Newton MD  09/01/20 1311

## 2020-09-01 NOTE — ED TRIAGE NOTES
Pt here with c/o bilateral knee pain due to chronic arthritis, denies injury. Pt also c/o rash under bilateral breast and under fold of abdomen.

## 2020-10-14 NOTE — TELEPHONE ENCOUNTER
----- Message from Kimberly Flowers sent at 10/14/2020 11:18 AM CDT -----  Regarding: Patient Call back  Contact: Patient  Type: Patient Call Back    Who called: Patient     What is the request in detail: Pt is requesting a call back in regards to getting an order for physical therapy     Can the clinic reply by [facility]?    Would the patient rather a call back or a response via [facility]? Call back     Best call back number: [phone]

## 2020-10-19 NOTE — PROGRESS NOTES
Outpatient Therapy Discharge Summary     Name: Kylie Aleman  [facility] Number: [id number]    Therapy Diagnosis:   Encounter Diagnoses   Name Primary?    Bilateral chronic knee pain      Bilateral foot pain      Unsteady gait      Weakness of both lower extremities        Physician: Mannie Hutchison MD     Physician Orders: PT Eval and Treat  Medical Diagnosis: M79.604 (ICD-10-CM) - Pain in right leg M79.605 (ICD-10-CM) - Pain in left leg R26.81 (ICD-10-CM) - Unsteadiness on feet     Evaluation Date: 5/9/19  POC: 8/15/2020 to 9/11/2020     Date of Last visit: 8/20/2020  Total Visits Received: 84  Cancelled Visits: 4 for the month of Aug  No Show Visits: 1      OBJECTIVE     ROM:   UPPER EXTREMITY--AROM/PROM  (R) UE: WFLs  (L) UE: WFLs           RANGE OF MOTION--LOWER EXTREMITIES  (R) LE Hip: equal bilaterally- limited in extension   Knee: equal bilaterally- limited in flexion/ extension   Ankle: equal bilaterally    (L) LE: Hip: equal bilaterally- limited in extension   Knee: equal bilaterally- limited in flexion/ extension   Ankle: equal bilaterally    Strength: manual muscle test grades below   Upper Extremity Strength  BUE grossly WFLs    Lower Extremity Strength  Not tested due to unexpected d/c    Abdominal Strength: Not tested due to unexpected d/c     D/C   Single Limb Stance R LE unable  (<10 sec = HIGH FALL RISK)   Single Limb Stance L LE unable  (<10 sec = HIGH FALL RISK)      12/18/19 8/14/20   TUG 28.5 sec w/ RW unable   30 sec sit<>stand 14x with arms on armrests 15x with BUE on armrests   6 min walk NT unable       Gait Assessment:   - AD used: RW  - Assistance: unable at time of d/c  - Distance: NA  - Curb: D- in w/c  - Ramp:  D- in w/c      Functional Mobility (Bed mobility, transfers)  Bed mobility: Mod I  Supine to sit: Mod I  Sit to supine: Mod I  Rolling: Mod I  Transfers to bed: Mod I- lateral/ squat pivot  Transfers to toilet: Mod I- squat pivot  Sit to stand:  Mod I- if pt not in  pain  Stand pivot:  Mod I- if pt not in pain  Car transfers: Mod I  Wheelchair mobility: Mod I  Floor transfers: NT    Written Home Exercises Provided: Patient instructed to cont prior HEP.    Kylie demonstrated good  understanding of the education provided.     See EMR under Patient Instructions for exercises provided prior visit.      Assessment    60 year old female with diagnosis of Pain in right leg, Pain in left leg, and Unsteadiness on feet was referred to [facility] received skilled outpatient PT 12/9/2019 to 8/20/2020. Pt had a break in PT treatment 3/11/2020 to 5/18/2020 due being placed on hold by clinician out of an abundance of precaution over the spread of COVID-19. When pt returned to PT, she presented with increased knee pain. Pt reported that she was not active while she was on hold from PT. Pt demonstrated a decline in ambulation tolerance and sit<>stand ability. Pt reported poor compliance with standing at home and performing sitting HEP to address pain and strength. Prior to d/c, pt was prescribed Tramadol which temporarily helped tolerance to PT. Pt then began reporting pain as a limiting factor to ambulation. Pt also began cancelling PT appointments due to pain. Poor attendance, pain and inconsistent participation in HEP limited progress towards goals.      Goals:   Goals: formal status as of 8/14/2020   Short Term Goals:    1.Report decreased in pain at worst less than <   / =  4  /10  to increase tolerance for functional activities. Not met   2. Pt to improve B knee range of motion by 8 degrees to allow for improved functional mobility to allow for improvement in IADLs. MET  3. Increased B knee  MMT 1/2  to increase tolerance for ADL and work activities. MET  4. Pt to report an ability to perform stand pivot transfer into her sofa. MET   5. Pt to tolerate HEP to improve ROM and independence with ADL's. MET  6. New 1/15/2020: pt will tolerate 6 minute walk test to  demonstrate improved gait tolerance.- 2 min walk with RW declined/ not met     Long Term Goals:  8 weeks,   1. Report decreased in pain at worse less than  <   / =  2  /10  to increase tolerance for functional mobility. Not met  2. Pt to improve B knee range of motion by 8 degrees to allow for improved functional mobility to allow for improvement in IADLs.  MET  3. Increased B knee MMT 1 grade  to increase tolerance for ADL and work activities. MET  4. Pt will scores report at CJ level (20-40% impaired) on LEFS  to demonstrate increase in LE function with every day tasks. not met  5. Pt to be Independent with HEP to improve ROM and independence with ADL's. Not met  6. New goal 8/14/2019: Pt will decrease score on TUG to </= 30 seconds in order to make her ambulation more functional. met 12/18/19              Revised 12/18/19:  pt will perform TUG in <20 sec to demonstrate decreased fall risk and improvement in mobility for household distance to decrease w/c use.  Not met  7. New 12/19/19: pt will demonstrate improved general strength for being able to stand from various surfaces by performing 9 sit<>stands from chair without armrests.- improved/ not met- 15x with use of armrests  8. New 12/19/19: pt will perform static standing with intermittent UE support for 5 min to improve ability to perform some house hold duties (I.e. wiping a counter)- not met- ~2 sec without UE support, SBA      Discharge reason: Patient has not attended therapy since 8/20/2020 and Patient has reached the maximum rehab potential for the present time    Plan   This patient is discharged from Physical Therapy

## 2020-10-22 NOTE — TELEPHONE ENCOUNTER
Made an appt.            ----- Message from Romain Denson sent at 10/22/2020  1:44 PM CDT -----  Regarding: orders for physical therapy.  Type: Patient Call Back    Who called:Kylie     What is the request in detail: The patient is requesting an order to continue physical. She would like it sent over to [facility]     Can the clinic reply by [facility]?no    Would the patient rather a call back or a response via [facility]? Call back     Best call back number: [phone]

## 2020-12-04 PROBLEM — R53.1 WEAKNESS GENERALIZED: Status: ACTIVE | Noted: 2020-01-01

## 2020-12-04 PROBLEM — Z74.09 IMPAIRED FUNCTIONAL MOBILITY, BALANCE, GAIT, AND ENDURANCE: Status: ACTIVE | Noted: 2020-01-01

## 2020-12-05 NOTE — PLAN OF CARE
[facility]  Physical Therapy Neurological Rehabilitation Initial Evaluation    Name: Kylie Aleman  Clinic Number: [id number]    Therapy Diagnosis:   Encounter Diagnoses   Name Primary?    Gait instability     Weakness generalized     Impaired functional mobility, balance, gait, and endurance        Physician: Mannie Hutchison MD    Physician Orders: PT Eval and Treat   Medical Diagnosis from Referral: R26.81 (ICD-10-CM) - Gait instability  Evaluation Date: 12/1/2020  Authorization Period Expiration: 1/11/21  Plan of Care Expiration: 3/1/21  Visit # / Visits authorized: 1/ 12    Time In: 9:50 am  Time Out: 10:30 am  Total Billable Time: 40 minutes    Precautions: Standard and Fall    Subjective   Date of onset: multiple years  History of current condition - Kylie reports: Pt reports neuropathy in feet, dislocated disc in lumbar, and arthritis in knees. Pt states she was told recently told she had low calcium. Has been taking vitamins and has noticed an improvement to energy. Has not been performing any exercises since she was last in therapy. Use of wheelchair for community outings and around home, use of walker for bathroom as she is unable to get WC into bathroom. Lives with son currently, but states she is modified independent with ADLs.      Medical History:   Past Medical History:   Diagnosis Date    Depression     Encounter for blood transfusion     Hypertension        Surgical History:   Kylie Aleman  has a past surgical history that includes Hysterectomy and Total hip arthroplasty.    Medications:   Kylie has a current medication list which includes the following prescription(s): amlodipine, calcium carbonate-vitamin d3, diclofenac sodium, escitalopram oxalate, gabapentin, hydrochlorothiazide, ibuprofen, nystatin-triamcinolone, and pregabalin.    Allergies:   Review of patient's allergies indicates:  No Known Allergies     Imaging: No recent imaging in  system    Prior Therapy: yes intermittently for the last year and a half  Social History: lives with their son  Falls: no falls reported   DME: Manual wheelchair, Walker, Straight cane and Tub bench    Home Environment: single story home, WC accessible per pt report for bedroom.   Exercise Routine / History: none since previous treatment   Family Present at time of Eval: none  Occupation: disability   Prior Level of Function: Min A  Current Level of Function: Min A - mod I    Pain:  Current 6/10 knees/feet and 3/10, worst 10/10, best 6/10   Location: bilateral knees  Description: Sharp and cramps  Aggravating Factors: Sitting  Easing Factors: ibuprofen,     Pts goals: improved walking ability     Objective     - Follows commands: yes  - Speech: no deficits     Mental status: alert, oriented to person, place, and time  Appearance: Casually dressed  Behavior:  calm and cooperative  Attention Span and Concentration:  Normal    Dominant hand:  right     Posture Alignment :slouched posture    Skin integrity:  Intact    Sensation:  Light Touch: Intact           Proprioception:   Intact      ROM:   UPPER EXTREMITY--AROM/PROM  (R) UE: WNLs  (L) UE: WNLs           RANGE OF MOTION--LOWER EXTREMITIES  (R) LE Hip: full   Knee: limited by pain   Ankle: equal bilaterally    (L) LE: Hip: full   Knee: limited by pain   Ankle: equal bilaterally    Strength: manual muscle test grades below   Upper Extremity Strength  Gross B strength 4/5     Lower Extremity Strength   RLE LLE   Hip Flexion: 4-/5 4-/5   Hip Extension:  2+/5 2+/5   Hip Abduction: 4-/5 4-/5   Hip Adduction: 3-/5 3-/5   Knee Extension: 4/5 4/5   Knee Flexion: 4/5 4/5   Ankle Dorsiflexion: 4-/5 4/5   Ankle Plantarflexion: 4-/5 4-/5     Abdominal Strength: 4/5       Evaluation   Single Limb Stance R LE NT  (<10 sec = HIGH FALL RISK)   Single Limb Stance L LE NT  (<10 sec = HIGH FALL RISK)   5 times sit-stand 26 seconds with single UE on chair and single on FWW  >12 sec=  fall risk for general elderly  >16 sec= fall risk for Parkinson's disease  >10 sec= balance/vestibular dysfunction (<61 y/o)  >14.2 sec= balance/vestibular dysfunction (>61 y/o)  >12 sec= fall risk for CVA   Banda/ FGA/ [place]etti NV     Postural control: NT  MCTSIB:  1. Eyes Open/feet together/Firm:  seconds  2. Eyes Closed/feet together/Firm:  seconds  3. Eyes Open/feet together/Foam:  seconds  4. Eyes Closed/feet together/Foam:  seconds    Gait Assessment:   - AD used: FWW  - Assistance: Supervision  - Distance: 30 ft  - Curb: NT  - Ramp:  Mod I with WC, NT with FWW  - Stairs: NT      GAIT DEVIATIONS:  Gait component performance:   Pt ambulates with poor foot clearance bilaterally, foot flat at contact, heavy use of UE support on FWW  * Above impairments indicate decreased gait safety and increased fall risk.      Impairments contributing to deviations: impaired motor control, weakness, impaired balance, knee pain, neuropathy    Endurance Deficit: fatigued after 6 meter walk test and TUG testing      Evaluation   Timed Up and Go 34 sec with FWW  < 20 sec safe for independent transfers,     < 30 sec assist required for transfers   6 meter walk test 0.3 m/sec (6m/18s)   6 min walk test NT     Functional Mobility (Bed mobility, transfers)  Bed mobility: Mod I with inc time  Supine to sit: Mod I  Sit to supine: Mod I  Rolling: Mod I  Transfers to bed: Mod I  Transfers to toilet: Mod I per pt report  Sit to stand:  Mod I  Stand pivot:  Mod I  Car transfers: Mod I  Wheelchair mobility: Mod I  Floor transfers: NT       CMS Impairment/Limitation/Restriction for FOTO Survey    Therapist reviewed FOTO scores for [place] JODI Aleman on 12/1/2020.   FOTO documents entered into [employer] - see Media section.    Limitation Score: %  Category: Mobility         TREATMENT   Treatment Time In: 10:25 am  Treatment Time Out: 10:30 am  Total Treatment time separate from Evaluation: 5 minutes    Kylie received therapeutic exercises to develop  strength and endurance for 5 minutes including:    Discussed SLR  Bridges  Seated marches  LAQ  Sit to stand      Home Exercises and Patient Education Provided    Education provided:   - Role of PT/POC  - walking program at home with use of FWW to improve endurance    Written Home Exercises Provided: yes.  Exercises were reviewed and Kylie was able to demonstrate them prior to the end of the session.  Kylie demonstrated good  understanding of the education provided.     See EMR under Patient Instructions for exercises provided 12/1/2020.    Assessment   Kylie is a 60 y.o. female referred to outpatient Physical Therapy with a medical diagnosis of gait instability. Pt presents with bilateral knee pain, weakness in bilateral lower extremities, poor balance, impaired functional mobility, and gait instability. Pt uses wheelchair for community and household distances currently. Able to ambulate 30ft with use of FWW and CGA-SBA. She displays heavy reliance on UE support, flat foot at contact, and poor foot clearance bilaterally while ambulating. Pt is modified independent with all bed mobility but requires increased time. Pt performs TUG in 34 seconds with FWW demonstrating decreased household mobility. Pt performed 5 time sit to  26 seconds indicating impaired functional strength and mobility. Her gait speed is 0.3 m/s placing her at increased risk of falls. Pt is appropriate for physical therapy at this time to be able to improve strength and balance to be able to ambulate with use of FWW for household and community distances.     Pt prognosis is Fair.   Pt will benefit from skilled outpatient Physical Therapy to address the deficits stated above and in the chart below, provide pt/family education, and to maximize pt's level of independence.     Plan of care discussed with patient: Yes  Pt's spiritual, cultural and educational needs considered and patient is agreeable to the plan of care and goals as stated  below:     Anticipated Barriers for therapy: compliance with HEP    Medical Necessity is demonstrated by the following  History  Co-morbidities and personal factors that may impact the plan of care Co-morbidities:   depression, encounter for blood transfusion, low calcium, chronic pain of multiple joints    Personal Factors:   age  lifestyle     high   Examination  Body Structures and Functions, activity limitations and participation restrictions that may impact the plan of care Body Regions:   lower extremities  upper extremities  trunk    Body Systems:    gross symmetry  ROM  strength  gross coordinated movement  balance  gait  transfers  transitions  motor control    Participation Restrictions:   Unable to ambulate household/community distances with walker, risk of falls    Activity limitations:   Learning and applying knowledge  no deficits    General Tasks and Commands  no deficits    Communication  no deficits    Mobility  lifting and carrying objects  walking  moving around using equipment (WC)    Self care  washing oneself (bathing, drying, washing hands)  looking after one's health    Domestic Life  shopping  cooking  doing house work (cleaning house, washing dishes, laundry)    Interactions/Relationships  no deficits    Life Areas  no deficits    Community and Social Life  community life         moderate   Clinical Presentation stable and uncomplicated low   Decision Making/ Complexity Score: low     Goals:  Short Term Goals: 6 weeks   1. Pt will report compliance with HEP >/= 3x/week to improve carry over.   2. Pt will perform TUG in </= 30 seconds with FWW to improve mobility in home.   3. Pt will perform 5x sit<> </=22 seconds to improve functional strength and endurance.   4. Pt will perform 6 meter walk test with FWW in 15 seconds and SBA to demonstrate improved gait speed and decrease fall risk.       Long Term Goals: 12 weeks   1. Pt will demonstrate independence with HEP >/= 3x/week to  improve carry over.   2. Pt will perform TUG in </= 25 seconds to improve mobility in home.   3. Pt will perform 5x sit<> </=18 seconds to improve functional strength and endurance.   4. Pt will perform 6 meter walk test with FWW in 12 seconds and mod I to demonstrate improved gait speed and decrease fall risk.   5. Pt will improve BLE strength by 1/2 MMT score to improve functional mobility.  6. Pt will perform MCTSIB for 30 seconds in all categories to improve independence for ADLs and decrease risk of fall.    Plan   Plan of care Certification: 12/1/2020 to 3/1/21.    Outpatient Physical Therapy 2 times weekly for 12 weeks to include the following interventions: Gait Training, Manual Therapy, Moist Heat/ Ice, Neuromuscular Re-ed, Orthotic Management and Training, Patient Education, Therapeutic Activites, Therapeutic Exercise and cardiovascular training.     Amanda Diaz, PT, DPT

## 2020-12-10 NOTE — PROGRESS NOTES
Physical Therapy Daily Treatment Note     Name: Kylie Aleman  Clinic Number: [id number]    Therapy Diagnosis:   Encounter Diagnoses   Name Primary?    Weakness generalized Yes    Impaired functional mobility, balance, gait, and endurance      Physician: Mannie Hutchison MD    Visit Date: 12/10/2020    Physician Orders: PT Eval and Treat   Medical Diagnosis from Referral: R26.81 (ICD-10-CM) - Gait instability  Evaluation Date: 12/1/2020  Authorization Period Expiration: 1/11/21  Plan of Care Expiration: 3/1/21  Visit # / Visits authorized: 2/ 12     Time In: 0916  Time Out: 1000  Total Billable Time: 40 minutes     Precautions: Standard and Fall      Subjective     Pt reports: she was tired of sitting around so it is time to come back to therapy. Had to wait on Dr's orders. My knees and feet are hurting me  She was not compliant with home exercise program.  Response to previous treatment: 1st treat  Functional change: ongoing    Pain: 5/10  Location: bilateral knees and feet     Objective     Kylie received therapeutic exercises to develop strength for 45 minutes including:    Nu step 7 mins  SAQ 2x10 2#  Bridging 3x10  SLR 2x10  HL hip adduction x20  HL hip abduction GTB x20  Pt ambulated 86ft x2 with RW SBA and W/c follow for safety      Kylie received the following manual therapy techniques:  were applied to the:  for  minutes, including:      Kylie participated in neuromuscular re-education activities to improve:  for  minutes. The following activities were included:      [employer] received hot pack for  minutes to .    Kylie received cold pack for  minutes to .      Home Exercises Provided and Patient Education Provided     Education provided:   - Practice HEP  -Possible DOMs    Written Home Exercises Provided: .  Exercises were reviewed and Kylie was able to demonstrate them prior to the end of the session.  Kylie demonstrated  understanding of the education provided.     See EMR under Patient  Instructions for exercises provided prior visit.    Assessment     Pt tolerated 1st treatment after eval well today. Pt enters clinic in w/c. Pt states she has not been very active since she has stopped therapy. Pt performed all exercises listed above without any c/o knee pain. Pt had decreased step height and length with gait.    Kylie is progressing well towards her goals.   Pt prognosis is Good.     Pt will continue to benefit from skilled outpatient physical therapy to address the deficits listed in the problem list box on initial evaluation, provide pt/family education and to maximize pt's level of independence in the home and community environment.     Pt's spiritual, cultural and educational needs considered and pt agreeable to plan of care and goals.     Anticipated barriers to physical therapy: transportation    Goals:  Short Term Goals: 6 weeks   1. Pt will report compliance with HEP >/= 3x/week to improve carry over.   2. Pt will perform TUG in </= 30 seconds with FWW to improve mobility in home.   3. Pt will perform 5x sit<> </=22 seconds to improve functional strength and endurance.   4. Pt will perform 6 meter walk test with FWW in 15 seconds and SBA to demonstrate improved gait speed and decrease fall risk.         Long Term Goals: 12 weeks   1. Pt will demonstrate independence with HEP >/= 3x/week to improve carry over.   2. Pt will perform TUG in </= 25 seconds to improve mobility in home.   3. Pt will perform 5x sit<> </=18 seconds to improve functional strength and endurance.   4. Pt will perform 6 meter walk test with FWW in 12 seconds and mod I to demonstrate improved gait speed and decrease fall risk.   5. Pt will improve BLE strength by 1/2 MMT score to improve functional mobility.  6. Pt will perform MCTSIB for 30 seconds in all categories to improve independence for ADLs and decrease risk of fall.         Plan     Outpatient Physical Therapy 2 times weekly for 12 weeks to  include the following interventions: Gait Training, Manual Therapy, Moist Heat/ Ice, Neuromuscular Re-ed, Orthotic Management and Training, Patient Education, Therapeutic Activites, Therapeutic Exercise and cardiovascular training.       Vaibhav Tejada, PTA

## 2020-12-24 NOTE — PROGRESS NOTES
Physical Therapy Daily Treatment Note     Name: Kylie Aleman  [facility] Number: [id number]    Therapy Diagnosis:   Encounter Diagnoses   Name Primary?    Weakness generalized     Impaired functional mobility, balance, gait, and endurance      Physician: Mannie Hutchison MD    Visit Date: 12/24/2020    Physician Orders: PT Eval and Treat   Medical Diagnosis from Referral: R26.81 (ICD-10-CM) - Gait instability  Evaluation Date: 12/1/2020  Authorization Period Expiration: 1/11/21  Plan of Care Expiration: 3/1/21  Visit # / Visits authorized: 3/ 12     Time In: 8:45 am  Time Out: 9:30 am  Total Billable Time: 45 minutes     Precautions: Standard and Fall      Subjective     Pt reports: She had some tenderness and pain in lateral right knee a few days ago. Went to physician today and states he indicates no abnormalities. Knee is feeling better today.     She was not compliant with home exercise program.  Response to previous treatment: good  Functional change: ongoing    Pain: 5/10  Location: bilateral knees and feet     Objective     Kylie received therapeutic exercises to develop strength for 45 minutes including:    Nu step 7 mins    SAQ 2x10 2#  Bridging 3x10  SLR 2x10  HL hip adduction x20  HL hip abduction GTB x20  Sit to stand with intermittent HHA x 8    Pt ambulated 88 ft x3 with RW SBA and W/c follow for safety      Kylie received the following manual therapy techniques:  were applied to the:  for  minutes, including:      Kylie participated in neuromuscular re-education activities to improve:  for  minutes. The following activities were included:      Kylie received hot pack for  minutes to .    Kylie received cold pack for  minutes to .      Home Exercises Provided and Patient Education Provided     Education provided:   - Practice HEP  -Possible DOMs    Written Home Exercises Provided: .  Exercises were reviewed and Kylie was able to demonstrate them prior to the end of the session.  Kylie  demonstrated  understanding of the education provided.     See EMR under Patient Instructions for exercises provided prior visit.    Assessment     Pt tolerated treatment well today. Able to transition with modified independence. Improvement in endurance with gait training today. Able to complete 3 small laps in gym with FWW and supervision. Continuation and progression of general LE strengthening exercises with good tolerance. Pt had decreased step height and length with gait.    Kylie is progressing well towards her goals.   Pt prognosis is Good.     Pt will continue to benefit from skilled outpatient physical therapy to address the deficits listed in the problem list box on initial evaluation, provide pt/family education and to maximize pt's level of independence in the home and community environment.     Pt's spiritual, cultural and educational needs considered and pt agreeable to plan of care and goals.     Anticipated barriers to physical therapy: transportation    Goals:  Short Term Goals: 6 weeks progressing  1. Pt will report compliance with HEP >/= 3x/week to improve carry over.   2. Pt will perform TUG in </= 30 seconds with FWW to improve mobility in home.   3. Pt will perform 5x sit<> </=22 seconds to improve functional strength and endurance.   4. Pt will perform 6 meter walk test with FWW in 15 seconds and SBA to demonstrate improved gait speed and decrease fall risk.         Long Term Goals: 12 weeks   1. Pt will demonstrate independence with HEP >/= 3x/week to improve carry over.   2. Pt will perform TUG in </= 25 seconds to improve mobility in home.   3. Pt will perform 5x sit<> </=18 seconds to improve functional strength and endurance.   4. Pt will perform 6 meter walk test with FWW in 12 seconds and mod I to demonstrate improved gait speed and decrease fall risk.   5. Pt will improve BLE strength by 1/2 MMT score to improve functional mobility.  6. Pt will perform MCTSIB for 30  seconds in all categories to improve independence for ADLs and decrease risk of fall.         Plan     Outpatient Physical Therapy 2 times weekly for 12 weeks to include the following interventions: Gait Training, Manual Therapy, Moist Heat/ Ice, Neuromuscular Re-ed, Orthotic Management and Training, Patient Education, Therapeutic Activites, Therapeutic Exercise and cardiovascular training.       Amanda Diaz, PT

## 2020-12-28 NOTE — PROGRESS NOTES
Physical Therapy Daily Treatment Note     Name: Kylie Aleman  Clinic Number: [id number]    Therapy Diagnosis:   Encounter Diagnoses   Name Primary?    Weakness generalized     Impaired functional mobility, balance, gait, and endurance      Physician: Mannie Hutchison MD    Visit Date: 12/29/2020    Physician Orders: PT Eval and Treat   Medical Diagnosis from Referral: R26.81 (ICD-10-CM) - Gait instability  Evaluation Date: 12/1/2020  Authorization Period Expiration: 1/11/21  Plan of Care Expiration: 3/1/21  Visit # / Visits authorized: 5/ 12     Time In: 0900 am  Time Out: 0945 am  Total Billable Time: 45 minutes     Precautions: Standard and Fall      Subjective     Pt reports:She is still a little sore in her hip and knee from her fall the other day but it has gotten better.     She was not compliant with home exercise program.  Response to previous treatment: good  Functional change: ongoing    Pain: 5/10  Location: bilateral knees and feet     Objective     Kylie received therapeutic exercises to develop strength for 45 minutes including:    Nu step 8 mins    SAQ 3x10 2#  LAQ 2x10  Bridging 2x10- RTB  SLR 3x10  HL hip adduction x30  HL hip abduction GTB x30  Sit to stand with intermittent HHA x 8      Kylie received gait training for 10 minutes, including:  Pt ambulated 156 ft x2,  with RW SBA and W/c follow for safety    Kylie participated in neuromuscular re-education activities to improve:  for  minutes. The following activities were included:      Kylie received hot pack for  minutes to .    Kylie received cold pack for  minutes to .      Home Exercises Provided and Patient Education Provided     Education provided:   - Practice HEP  -Possible DOMs    Written Home Exercises Provided: .  Exercises were reviewed and Kylie was able to demonstrate them prior to the end of the session.  Kylie demonstrated  understanding of the education provided.     See EMR under Patient Instructions for  exercises provided prior visit.    Assessment     Pt tolerated treatment well today. She tolerated progression of resistance band with bridges well without adverse effects. She also tolerated increase reps with SLR and SAQ well. She displayed good improvement with ambulation as she was able to walk 156 ft with RW before requiring a seated rest break.   Kylie is progressing well towards her goals.   Pt prognosis is Good.     Pt will continue to benefit from skilled outpatient physical therapy to address the deficits listed in the problem list box on initial evaluation, provide pt/family education and to maximize pt's level of independence in the home and community environment.     Pt's spiritual, cultural and educational needs considered and pt agreeable to plan of care and goals.     Anticipated barriers to physical therapy: transportation    Goals:  Short Term Goals: 6 weeks progressing  1. Pt will report compliance with HEP >/= 3x/week to improve carry over.   2. Pt will perform TUG in </= 30 seconds with FWW to improve mobility in home.   3. Pt will perform 5x sit<> </=22 seconds to improve functional strength and endurance.   4. Pt will perform 6 meter walk test with FWW in 15 seconds and SBA to demonstrate improved gait speed and decrease fall risk.         Long Term Goals: 12 weeks   1. Pt will demonstrate independence with HEP >/= 3x/week to improve carry over.   2. Pt will perform TUG in </= 25 seconds to improve mobility in home.   3. Pt will perform 5x sit<> </=18 seconds to improve functional strength and endurance.   4. Pt will perform 6 meter walk test with FWW in 12 seconds and mod I to demonstrate improved gait speed and decrease fall risk.   5. Pt will improve BLE strength by 1/2 MMT score to improve functional mobility.  6. Pt will perform MCTSIB for 30 seconds in all categories to improve independence for ADLs and decrease risk of fall.         Plan     Outpatient Physical Therapy  2 times weekly for 12 weeks to include the following interventions: Gait Training, Manual Therapy, Moist Heat/ Ice, Neuromuscular Re-ed, Orthotic Management and Training, Patient Education, Therapeutic Activites, Therapeutic Exercise and cardiovascular training.       Karen Sykes, PT   12/29/2020

## 2020-12-28 NOTE — PROGRESS NOTES
Physical Therapy Daily Treatment Note     Name: Kylie Aleman  [facility] Number: [id number]    Therapy Diagnosis:   Encounter Diagnoses   Name Primary?    Weakness generalized     Impaired functional mobility, balance, gait, and endurance      Physician: Mannie Hutchison MD    Visit Date: 12/28/2020    Physician Orders: PT Eval and Treat   Medical Diagnosis from Referral: R26.81 (ICD-10-CM) - Gait instability  Evaluation Date: 12/1/2020  Authorization Period Expiration: 1/11/21  Plan of Care Expiration: 3/1/21  Visit # / Visits authorized: 3/ 12     Time In: 8:45 am  Time Out: 9:30 am  Total Billable Time: 45 minutes     Precautions: Standard and Fall      Subjective     Pt reports: She had a fall yesterday while trying to get into the restroom using her FWW. Feel onto right knee that has been bothering her intermittently.     She was not compliant with home exercise program.  Response to previous treatment: good  Functional change: ongoing    Pain: 5/10  Location: bilateral knees and feet     Objective     Kylie received therapeutic exercises to develop strength for 37 minutes including:    Nu step 7 mins    SAQ 2x10 2#-NP today  Bridging 3x10- add TB at next visit  SLR 2x10  HL hip adduction x30  HL hip abduction GTB x30  Sit to stand with intermittent HHA x 8      Kylie received gait training for 8 minutes, including:  Pt ambulated 88 ft x2, 60 ft x 1 with RW SBA and W/c follow for safety    Kylie participated in neuromuscular re-education activities to improve:  for  minutes. The following activities were included:      Kylie received hot pack for  minutes to .    [place] received cold pack for  minutes to .      Home Exercises Provided and Patient Education Provided     Education provided:   - Practice HEP  -Possible DOMs    Written Home Exercises Provided: .  Exercises were reviewed and Kylie was able to demonstrate them prior to the end of the session.  Kylie demonstrated  understanding of the  education provided.     See EMR under Patient Instructions for exercises provided prior visit.    Assessment     Pt tolerated treatment well today. Continuation and progression of repetitions with LE strengthening with good tolerance. Slight knee extensor lag noted bilaterally but able to correct with cuing. Gait training with FWW and WC follow for safety. Able to complete multiple laps but requires rest break between due to poor endurance. Pt remains appropriate for PT at this time.     Kylie is progressing well towards her goals.   Pt prognosis is Good.     Pt will continue to benefit from skilled outpatient physical therapy to address the deficits listed in the problem list box on initial evaluation, provide pt/family education and to maximize pt's level of independence in the home and community environment.     Pt's spiritual, cultural and educational needs considered and pt agreeable to plan of care and goals.     Anticipated barriers to physical therapy: transportation    Goals:  Short Term Goals: 6 weeks progressing  1. Pt will report compliance with HEP >/= 3x/week to improve carry over.   2. Pt will perform TUG in </= 30 seconds with FWW to improve mobility in home.   3. Pt will perform 5x sit<> </=22 seconds to improve functional strength and endurance.   4. Pt will perform 6 meter walk test with FWW in 15 seconds and SBA to demonstrate improved gait speed and decrease fall risk.         Long Term Goals: 12 weeks   1. Pt will demonstrate independence with HEP >/= 3x/week to improve carry over.   2. Pt will perform TUG in </= 25 seconds to improve mobility in home.   3. Pt will perform 5x sit<> </=18 seconds to improve functional strength and endurance.   4. Pt will perform 6 meter walk test with FWW in 12 seconds and mod I to demonstrate improved gait speed and decrease fall risk.   5. Pt will improve BLE strength by 1/2 MMT score to improve functional mobility.  6. Pt will perform MCTSIB  for 30 seconds in all categories to improve independence for ADLs and decrease risk of fall.         Plan     Outpatient Physical Therapy 2 times weekly for 12 weeks to include the following interventions: Gait Training, Manual Therapy, Moist Heat/ Ice, Neuromuscular Re-ed, Orthotic Management and Training, Patient Education, Therapeutic Activites, Therapeutic Exercise and cardiovascular training.       Amanda Diaz, PT

## 2021-01-01 ENCOUNTER — HOSPITAL ENCOUNTER (EMERGENCY)
Facility: HOSPITAL | Age: 61
Discharge: HOME | End: 2021-05-09
Attending: EMERGENCY MEDICINE
Payer: MEDICARE

## 2021-01-01 ENCOUNTER — HOSPITAL ENCOUNTER (EMERGENCY)
Facility: HOSPITAL | Age: 61
Discharge: HOME | End: 2021-03-03
Attending: EMERGENCY MEDICINE
Payer: MEDICARE

## 2021-01-01 ENCOUNTER — CLINICAL SUPPORT (OUTPATIENT)
Dept: REHABILITATION | Facility: HOSPITAL | Age: 61
End: 2021-01-01
Attending: NEUROLOGICAL SURGERY
Payer: MEDICARE

## 2021-01-01 ENCOUNTER — HOSPITAL ENCOUNTER (INPATIENT)
Facility: HOSPITAL | Age: 61
LOS: 1 days | DRG: 296 | End: 2021-07-12
Attending: EMERGENCY MEDICINE | Admitting: INTERNAL MEDICINE
Payer: MEDICARE

## 2021-01-01 ENCOUNTER — HOSPITAL ENCOUNTER (OUTPATIENT)
Facility: HOSPITAL | Age: 61
Discharge: HOME HEALTH | End: 2021-06-01
Attending: EMERGENCY MEDICINE | Admitting: EMERGENCY MEDICINE
Payer: MEDICARE

## 2021-01-01 ENCOUNTER — NURSE TRIAGE (OUTPATIENT)
Dept: ADMINISTRATIVE | Facility: CLINIC | Age: 61
End: 2021-01-01

## 2021-01-01 ENCOUNTER — HOSPITAL ENCOUNTER (EMERGENCY)
Facility: HOSPITAL | Age: 61
Discharge: HOME | End: 2021-06-29
Attending: EMERGENCY MEDICINE
Payer: MEDICARE

## 2021-01-01 VITALS
TEMPERATURE: 99 F | OXYGEN SATURATION: 97 % | SYSTOLIC BLOOD PRESSURE: 113 MMHG | RESPIRATION RATE: 18 BRPM | BODY MASS INDEX: 34.75 KG/M2 | DIASTOLIC BLOOD PRESSURE: 70 MMHG | HEART RATE: 96 BPM | WEIGHT: 190 LBS

## 2021-01-01 VITALS
OXYGEN SATURATION: 97 % | BODY MASS INDEX: 32.92 KG/M2 | TEMPERATURE: 99 F | SYSTOLIC BLOOD PRESSURE: 157 MMHG | WEIGHT: 180 LBS | DIASTOLIC BLOOD PRESSURE: 92 MMHG | RESPIRATION RATE: 17 BRPM | HEART RATE: 92 BPM

## 2021-01-01 VITALS
OXYGEN SATURATION: 99 % | RESPIRATION RATE: 20 BRPM | SYSTOLIC BLOOD PRESSURE: 105 MMHG | TEMPERATURE: 99 F | WEIGHT: 187 LBS | HEART RATE: 99 BPM | DIASTOLIC BLOOD PRESSURE: 63 MMHG | BODY MASS INDEX: 34.2 KG/M2

## 2021-01-01 VITALS
RESPIRATION RATE: 17 BRPM | HEIGHT: 62 IN | WEIGHT: 182.56 LBS | HEART RATE: 83 BPM | SYSTOLIC BLOOD PRESSURE: 144 MMHG | DIASTOLIC BLOOD PRESSURE: 102 MMHG | OXYGEN SATURATION: 97 % | TEMPERATURE: 94 F | BODY MASS INDEX: 33.6 KG/M2

## 2021-01-01 VITALS
BODY MASS INDEX: 34.45 KG/M2 | HEIGHT: 62 IN | TEMPERATURE: 98 F | DIASTOLIC BLOOD PRESSURE: 107 MMHG | OXYGEN SATURATION: 96 % | RESPIRATION RATE: 20 BRPM | SYSTOLIC BLOOD PRESSURE: 154 MMHG | HEART RATE: 100 BPM | WEIGHT: 187.19 LBS

## 2021-01-01 DIAGNOSIS — I10 ESSENTIAL HYPERTENSION: ICD-10-CM

## 2021-01-01 DIAGNOSIS — Z74.09 IMPAIRED FUNCTIONAL MOBILITY, BALANCE, GAIT, AND ENDURANCE: ICD-10-CM

## 2021-01-01 DIAGNOSIS — R53.1 WEAKNESS GENERALIZED: Primary | ICD-10-CM

## 2021-01-01 DIAGNOSIS — R53.1 WEAKNESS GENERALIZED: ICD-10-CM

## 2021-01-01 DIAGNOSIS — R57.9 SHOCK: ICD-10-CM

## 2021-01-01 DIAGNOSIS — Z95.828 STATUS POST PERIPHERALLY INSERTED CENTRAL CATHETER (PICC) CENTRAL LINE PLACEMENT: ICD-10-CM

## 2021-01-01 DIAGNOSIS — I51.7 CARDIOMEGALY: ICD-10-CM

## 2021-01-01 DIAGNOSIS — K59.00 CONSTIPATION, UNSPECIFIED CONSTIPATION TYPE: ICD-10-CM

## 2021-01-01 DIAGNOSIS — I46.9 CARDIAC ARREST: ICD-10-CM

## 2021-01-01 DIAGNOSIS — R07.9 CHEST PAIN: ICD-10-CM

## 2021-01-01 DIAGNOSIS — U07.1 PNEUMONIA DUE TO COVID-19 VIRUS: Primary | ICD-10-CM

## 2021-01-01 DIAGNOSIS — R29.898 WEAKNESS OF HIP, UNSPECIFIED LATERALITY: Primary | ICD-10-CM

## 2021-01-01 DIAGNOSIS — B35.4 TINEA CORPORIS: Primary | ICD-10-CM

## 2021-01-01 DIAGNOSIS — R41.82 ALTERED MENTAL STATUS, UNSPECIFIED ALTERED MENTAL STATUS TYPE: ICD-10-CM

## 2021-01-01 DIAGNOSIS — J12.82 PNEUMONIA DUE TO COVID-19 VIRUS: Primary | ICD-10-CM

## 2021-01-01 DIAGNOSIS — R26.81 UNSTEADY GAIT: ICD-10-CM

## 2021-01-01 DIAGNOSIS — R44.1 VISUAL HALLUCINATIONS: ICD-10-CM

## 2021-01-01 DIAGNOSIS — S32.000A COMPRESSION FRACTURE OF LUMBAR VERTEBRA, INITIAL ENCOUNTER, UNSPECIFIED LUMBAR VERTEBRAL LEVEL: ICD-10-CM

## 2021-01-01 DIAGNOSIS — R10.84 GENERALIZED ABDOMINAL PAIN: Primary | ICD-10-CM

## 2021-01-01 DIAGNOSIS — N30.00 ACUTE CYSTITIS WITHOUT HEMATURIA: ICD-10-CM

## 2021-01-01 DIAGNOSIS — R53.1 WEAKNESS: ICD-10-CM

## 2021-01-01 DIAGNOSIS — H10.32 ACUTE CONJUNCTIVITIS OF LEFT EYE, UNSPECIFIED ACUTE CONJUNCTIVITIS TYPE: ICD-10-CM

## 2021-01-01 DIAGNOSIS — N30.00 ACUTE CYSTITIS WITHOUT HEMATURIA: Primary | ICD-10-CM

## 2021-01-01 LAB
ALBUMIN SERPL BCP-MCNC: 1.8 G/DL (ref 3.5–5.2)
ALBUMIN SERPL BCP-MCNC: 2 G/DL (ref 3.5–5.2)
ALBUMIN SERPL BCP-MCNC: 2.1 G/DL (ref 3.5–5.2)
ALBUMIN SERPL BCP-MCNC: 2.8 G/DL (ref 3.5–5.2)
ALBUMIN SERPL BCP-MCNC: 2.9 G/DL (ref 3.5–5.2)
ALBUMIN SERPL BCP-MCNC: 3 G/DL (ref 3.5–5.2)
ALLENS TEST: ABNORMAL
ALP SERPL-CCNC: 106 U/L (ref 55–135)
ALP SERPL-CCNC: 62 U/L (ref 55–135)
ALP SERPL-CCNC: 64 U/L (ref 55–135)
ALP SERPL-CCNC: 70 U/L (ref 55–135)
ALP SERPL-CCNC: 72 U/L (ref 55–135)
ALP SERPL-CCNC: 95 U/L (ref 55–135)
ALT SERPL W/O P-5'-P-CCNC: 13 U/L (ref 10–44)
ALT SERPL W/O P-5'-P-CCNC: 13 U/L (ref 10–44)
ALT SERPL W/O P-5'-P-CCNC: 16 U/L (ref 10–44)
ALT SERPL W/O P-5'-P-CCNC: 29 U/L (ref 10–44)
ALT SERPL W/O P-5'-P-CCNC: 30 U/L (ref 10–44)
ALT SERPL W/O P-5'-P-CCNC: 36 U/L (ref 10–44)
AMMONIA PLAS-SCNC: 35 UMOL/L (ref 10–50)
AMORPH CRY URNS QL MICRO: ABNORMAL
AMPHET+METHAMPHET UR QL: NEGATIVE
ANION GAP SERPL CALC-SCNC: 10 MMOL/L (ref 8–16)
ANION GAP SERPL CALC-SCNC: 14 MMOL/L (ref 8–16)
ANION GAP SERPL CALC-SCNC: 18 MMOL/L (ref 8–16)
ANION GAP SERPL CALC-SCNC: 21 MMOL/L (ref 8–16)
ANISOCYTOSIS BLD QL SMEAR: SLIGHT
ANISOCYTOSIS BLD QL SMEAR: SLIGHT
APTT BLDCRRT: 29.6 SEC (ref 21–32)
APTT BLDCRRT: 29.9 SEC (ref 21–32)
AST SERPL-CCNC: 39 U/L (ref 10–40)
AST SERPL-CCNC: 40 U/L (ref 10–40)
AST SERPL-CCNC: 40 U/L (ref 10–40)
AST SERPL-CCNC: 41 U/L (ref 10–40)
AST SERPL-CCNC: 43 U/L (ref 10–40)
AST SERPL-CCNC: 46 U/L (ref 10–40)
BACTERIA #/AREA URNS HPF: ABNORMAL /HPF
BACTERIA BLD CULT: NORMAL
BACTERIA BLD CULT: NORMAL
BACTERIA UR CULT: ABNORMAL
BACTERIA UR CULT: ABNORMAL
BARBITURATES UR QL SCN>200 NG/ML: NEGATIVE
BASOPHILS # BLD AUTO: 0.06 K/UL (ref 0–0.2)
BASOPHILS # BLD AUTO: 0.06 K/UL (ref 0–0.2)
BASOPHILS # BLD AUTO: 0.07 K/UL (ref 0–0.2)
BASOPHILS # BLD AUTO: ABNORMAL K/UL (ref 0–0.2)
BASOPHILS # BLD AUTO: ABNORMAL K/UL (ref 0–0.2)
BASOPHILS NFR BLD: 0 % (ref 0–1.9)
BASOPHILS NFR BLD: 0 % (ref 0–1.9)
BASOPHILS NFR BLD: 0.7 % (ref 0–1.9)
BASOPHILS NFR BLD: 0.8 % (ref 0–1.9)
BASOPHILS NFR BLD: 0.8 % (ref 0–1.9)
BENZODIAZ UR QL SCN>200 NG/ML: NEGATIVE
BILIRUB SERPL-MCNC: 0.3 MG/DL (ref 0.1–1)
BILIRUB SERPL-MCNC: 0.4 MG/DL (ref 0.1–1)
BILIRUB SERPL-MCNC: 0.5 MG/DL (ref 0.1–1)
BILIRUB SERPL-MCNC: 0.5 MG/DL (ref 0.1–1)
BILIRUB SERPL-MCNC: 0.7 MG/DL (ref 0.1–1)
BILIRUB SERPL-MCNC: 1 MG/DL (ref 0.1–1)
BILIRUB UR QL STRIP: ABNORMAL
BILIRUB UR QL STRIP: NEGATIVE
BNP SERPL-MCNC: 330 PG/ML (ref 0–99)
BUN SERPL-MCNC: 12 MG/DL (ref 8–23)
BUN SERPL-MCNC: 13 MG/DL (ref 8–23)
BUN SERPL-MCNC: 13 MG/DL (ref 8–23)
BUN SERPL-MCNC: 17 MG/DL (ref 8–23)
BUN SERPL-MCNC: 19 MG/DL (ref 8–23)
BUN SERPL-MCNC: 9 MG/DL (ref 8–23)
BZE UR QL SCN: NEGATIVE
CALCIUM SERPL-MCNC: 7.3 MG/DL (ref 8.7–10.5)
CALCIUM SERPL-MCNC: 7.6 MG/DL (ref 8.7–10.5)
CALCIUM SERPL-MCNC: 7.7 MG/DL (ref 8.7–10.5)
CALCIUM SERPL-MCNC: 8.8 MG/DL (ref 8.7–10.5)
CALCIUM SERPL-MCNC: 9.2 MG/DL (ref 8.7–10.5)
CALCIUM SERPL-MCNC: 9.5 MG/DL (ref 8.7–10.5)
CANNABINOIDS UR QL SCN: NEGATIVE
CHLORIDE SERPL-SCNC: 103 MMOL/L (ref 95–110)
CHLORIDE SERPL-SCNC: 110 MMOL/L (ref 95–110)
CHLORIDE SERPL-SCNC: 110 MMOL/L (ref 95–110)
CHLORIDE SERPL-SCNC: 87 MMOL/L (ref 95–110)
CHLORIDE SERPL-SCNC: 88 MMOL/L (ref 95–110)
CHLORIDE SERPL-SCNC: 88 MMOL/L (ref 95–110)
CK SERPL-CCNC: 185 U/L (ref 20–180)
CK SERPL-CCNC: 237 U/L (ref 20–180)
CLARITY UR: ABNORMAL
CLARITY UR: CLEAR
CO2 SERPL-SCNC: 10 MMOL/L (ref 23–29)
CO2 SERPL-SCNC: 12 MMOL/L (ref 23–29)
CO2 SERPL-SCNC: 21 MMOL/L (ref 23–29)
CO2 SERPL-SCNC: 22 MMOL/L (ref 23–29)
CO2 SERPL-SCNC: 23 MMOL/L (ref 23–29)
CO2 SERPL-SCNC: 8 MMOL/L (ref 23–29)
COLOR UR: ABNORMAL
COLOR UR: YELLOW
CREAT SERPL-MCNC: 0.7 MG/DL (ref 0.5–1.4)
CREAT SERPL-MCNC: 0.8 MG/DL (ref 0.5–1.4)
CREAT SERPL-MCNC: 0.9 MG/DL (ref 0.5–1.4)
CREAT SERPL-MCNC: 1 MG/DL (ref 0.5–1.4)
CREAT UR-MCNC: 167.2 MG/DL (ref 15–325)
CRP SERPL-MCNC: 70.4 MG/L (ref 0–8.2)
CTP QC/QA: YES
D DIMER PPP IA.FEU-MCNC: 9.46 MG/L FEU
DACRYOCYTES BLD QL SMEAR: ABNORMAL
DACRYOCYTES BLD QL SMEAR: ABNORMAL
DELSYS: ABNORMAL
DIFFERENTIAL METHOD: ABNORMAL
EOSINOPHIL # BLD AUTO: 0.1 K/UL (ref 0–0.5)
EOSINOPHIL # BLD AUTO: ABNORMAL K/UL (ref 0–0.5)
EOSINOPHIL # BLD AUTO: ABNORMAL K/UL (ref 0–0.5)
EOSINOPHIL NFR BLD: 0 % (ref 0–8)
EOSINOPHIL NFR BLD: 0.8 % (ref 0–8)
EOSINOPHIL NFR BLD: 1 % (ref 0–8)
EOSINOPHIL NFR BLD: 1.1 % (ref 0–8)
EOSINOPHIL NFR BLD: 1.4 % (ref 0–8)
ERYTHROCYTE [DISTWIDTH] IN BLOOD BY AUTOMATED COUNT: 12.4 % (ref 11.5–14.5)
ERYTHROCYTE [DISTWIDTH] IN BLOOD BY AUTOMATED COUNT: 12.4 % (ref 11.5–14.5)
ERYTHROCYTE [DISTWIDTH] IN BLOOD BY AUTOMATED COUNT: 13.5 % (ref 11.5–14.5)
ERYTHROCYTE [DISTWIDTH] IN BLOOD BY AUTOMATED COUNT: 14 % (ref 11.5–14.5)
ERYTHROCYTE [DISTWIDTH] IN BLOOD BY AUTOMATED COUNT: 14.2 % (ref 11.5–14.5)
ERYTHROCYTE [SEDIMENTATION RATE] IN BLOOD BY WESTERGREN METHOD: 16 MM/H
ERYTHROCYTE [SEDIMENTATION RATE] IN BLOOD BY WESTERGREN METHOD: 16 MM/H
ERYTHROCYTE [SEDIMENTATION RATE] IN BLOOD BY WESTERGREN METHOD: 24 MM/H
ERYTHROCYTE [SEDIMENTATION RATE] IN BLOOD BY WESTERGREN METHOD: 33 MM/HR (ref 0–20)
ERYTHROCYTE [SEDIMENTATION RATE] IN BLOOD BY WESTERGREN METHOD: 34 MM/H
EST. GFR  (AFRICAN AMERICAN): >60 ML/MIN/1.73 M^2
EST. GFR  (NON AFRICAN AMERICAN): >60 ML/MIN/1.73 M^2
ETHANOL SERPL-MCNC: <10 MG/DL
FECAL OCCULT BLOOD, POC: NEGATIVE
FERRITIN SERPL-MCNC: 3525 NG/ML (ref 20–300)
FIO2: 100
FIO2: 100
FIO2: 50
FIO2: 70
FOLATE SERPL-MCNC: 10 NG/ML (ref 4–24)
FOLATE SERPL-MCNC: 9.8 NG/ML (ref 4–24)
GLUCOSE SERPL-MCNC: 447 MG/DL (ref 70–110)
GLUCOSE SERPL-MCNC: 486 MG/DL (ref 70–110)
GLUCOSE SERPL-MCNC: 647 MG/DL (ref 70–110)
GLUCOSE SERPL-MCNC: 78 MG/DL (ref 70–110)
GLUCOSE SERPL-MCNC: 88 MG/DL (ref 70–110)
GLUCOSE SERPL-MCNC: 98 MG/DL (ref 70–110)
GLUCOSE UR QL STRIP: ABNORMAL
GLUCOSE UR QL STRIP: NEGATIVE
HCO3 UR-SCNC: 12 MMOL/L (ref 24–28)
HCO3 UR-SCNC: 14 MMOL/L (ref 24–28)
HCO3 UR-SCNC: 14.2 MMOL/L (ref 24–28)
HCO3 UR-SCNC: 5.1 MMOL/L (ref 24–28)
HCT VFR BLD AUTO: 28.1 % (ref 37–48.5)
HCT VFR BLD AUTO: 29.7 % (ref 37–48.5)
HCT VFR BLD AUTO: 33.6 % (ref 37–48.5)
HCT VFR BLD AUTO: 35.3 % (ref 37–48.5)
HCT VFR BLD AUTO: 37.6 % (ref 37–48.5)
HGB BLD-MCNC: 10 G/DL (ref 12–16)
HGB BLD-MCNC: 11 G/DL (ref 12–16)
HGB BLD-MCNC: 11.6 G/DL (ref 12–16)
HGB BLD-MCNC: 11.9 G/DL (ref 12–16)
HGB BLD-MCNC: 12.9 G/DL (ref 12–16)
HGB UR QL STRIP: ABNORMAL
HGB UR QL STRIP: ABNORMAL
HGB UR QL STRIP: NEGATIVE
HGB UR QL STRIP: NEGATIVE
HYALINE CASTS #/AREA URNS LPF: 0 /LPF
HYALINE CASTS #/AREA URNS LPF: 4 /LPF
HYALINE CASTS #/AREA URNS LPF: 9 /LPF
IMM GRANULOCYTES # BLD AUTO: 0.03 K/UL (ref 0–0.04)
IMM GRANULOCYTES # BLD AUTO: ABNORMAL K/UL (ref 0–0.04)
IMM GRANULOCYTES # BLD AUTO: ABNORMAL K/UL (ref 0–0.04)
IMM GRANULOCYTES NFR BLD AUTO: 0.3 % (ref 0–0.5)
IMM GRANULOCYTES NFR BLD AUTO: 0.4 % (ref 0–0.5)
IMM GRANULOCYTES NFR BLD AUTO: 0.4 % (ref 0–0.5)
IMM GRANULOCYTES NFR BLD AUTO: ABNORMAL % (ref 0–0.5)
IMM GRANULOCYTES NFR BLD AUTO: ABNORMAL % (ref 0–0.5)
INR PPP: 1.3 (ref 0.8–1.2)
INR PPP: 1.3 (ref 0.8–1.2)
KETONES UR QL STRIP: ABNORMAL
KETONES UR QL STRIP: NEGATIVE
LACTATE SERPL-SCNC: 7.6 MMOL/L (ref 0.5–2.2)
LACTATE SERPL-SCNC: 9.7 MMOL/L (ref 0.5–2.2)
LACTATE SERPL-SCNC: >12 MMOL/L (ref 0.5–2.2)
LDH SERPL L TO P-CCNC: 430 U/L (ref 110–260)
LEUKOCYTE ESTERASE UR QL STRIP: ABNORMAL
LEUKOCYTE ESTERASE UR QL STRIP: ABNORMAL
LEUKOCYTE ESTERASE UR QL STRIP: NEGATIVE
LEUKOCYTE ESTERASE UR QL STRIP: NEGATIVE
LIPASE SERPL-CCNC: 25 U/L (ref 4–60)
LIPASE SERPL-CCNC: 37 U/L (ref 4–60)
LYMPHOCYTES # BLD AUTO: 3.2 K/UL (ref 1–4.8)
LYMPHOCYTES # BLD AUTO: 3.4 K/UL (ref 1–4.8)
LYMPHOCYTES # BLD AUTO: 3.9 K/UL (ref 1–4.8)
LYMPHOCYTES # BLD AUTO: ABNORMAL K/UL (ref 1–4.8)
LYMPHOCYTES # BLD AUTO: ABNORMAL K/UL (ref 1–4.8)
LYMPHOCYTES NFR BLD: 16 % (ref 18–48)
LYMPHOCYTES NFR BLD: 34 % (ref 18–48)
LYMPHOCYTES NFR BLD: 40.3 % (ref 18–48)
LYMPHOCYTES NFR BLD: 43.2 % (ref 18–48)
LYMPHOCYTES NFR BLD: 44.9 % (ref 18–48)
MAGNESIUM SERPL-MCNC: 1.3 MG/DL (ref 1.6–2.6)
MAGNESIUM SERPL-MCNC: 1.5 MG/DL (ref 1.6–2.6)
MAGNESIUM SERPL-MCNC: 1.9 MG/DL (ref 1.6–2.6)
MCH RBC QN AUTO: 33.4 PG (ref 27–31)
MCH RBC QN AUTO: 33.7 PG (ref 27–31)
MCH RBC QN AUTO: 35 PG (ref 27–31)
MCH RBC QN AUTO: 35.5 PG (ref 27–31)
MCH RBC QN AUTO: 36.7 PG (ref 27–31)
MCHC RBC AUTO-ENTMCNC: 33.7 G/DL (ref 32–36)
MCHC RBC AUTO-ENTMCNC: 34.3 G/DL (ref 32–36)
MCHC RBC AUTO-ENTMCNC: 34.5 G/DL (ref 32–36)
MCHC RBC AUTO-ENTMCNC: 35.6 G/DL (ref 32–36)
MCHC RBC AUTO-ENTMCNC: 37 G/DL (ref 32–36)
MCV RBC AUTO: 104 FL (ref 82–98)
MCV RBC AUTO: 104 FL (ref 82–98)
MCV RBC AUTO: 106 FL (ref 82–98)
MCV RBC AUTO: 90 FL (ref 82–98)
MCV RBC AUTO: 95 FL (ref 82–98)
METHADONE UR QL SCN>300 NG/ML: NEGATIVE
MICROSCOPIC COMMENT: ABNORMAL
MIN VOL: 12.8
MODE: ABNORMAL
MONOCYTES # BLD AUTO: 1 K/UL (ref 0.3–1)
MONOCYTES # BLD AUTO: 1.1 K/UL (ref 0.3–1)
MONOCYTES # BLD AUTO: 1.3 K/UL (ref 0.3–1)
MONOCYTES # BLD AUTO: ABNORMAL K/UL (ref 0.3–1)
MONOCYTES # BLD AUTO: ABNORMAL K/UL (ref 0.3–1)
MONOCYTES NFR BLD: 10 % (ref 4–15)
MONOCYTES NFR BLD: 12.9 % (ref 4–15)
MONOCYTES NFR BLD: 13 % (ref 4–15)
MONOCYTES NFR BLD: 14.1 % (ref 4–15)
MONOCYTES NFR BLD: 14.7 % (ref 4–15)
NEUTROPHILS # BLD AUTO: 2.8 K/UL (ref 1.8–7.7)
NEUTROPHILS # BLD AUTO: 3.6 K/UL (ref 1.8–7.7)
NEUTROPHILS # BLD AUTO: 3.8 K/UL (ref 1.8–7.7)
NEUTROPHILS NFR BLD: 38.4 % (ref 38–73)
NEUTROPHILS NFR BLD: 40 % (ref 38–73)
NEUTROPHILS NFR BLD: 44.8 % (ref 38–73)
NEUTROPHILS NFR BLD: 50 % (ref 38–73)
NEUTROPHILS NFR BLD: 64 % (ref 38–73)
NEUTS BAND NFR BLD MANUAL: 5 %
NEUTS BAND NFR BLD MANUAL: 7 %
NITRITE UR QL STRIP: NEGATIVE
NITRITE UR QL STRIP: POSITIVE
NON-SQ EPI CELLS #/AREA URNS HPF: 8 /HPF
NRBC BLD-RTO: 0 /100 WBC
NRBC BLD-RTO: 3 /100 WBC
NRBC BLD-RTO: 7 /100 WBC
OPIATES UR QL SCN: NEGATIVE
PCO2 BLDA: 24.2 MMHG (ref 35–45)
PCO2 BLDA: 26.8 MMHG (ref 35–45)
PCO2 BLDA: 38.2 MMHG (ref 35–45)
PCO2 BLDA: 39.7 MMHG (ref 35–45)
PCP UR QL SCN>25 NG/ML: NEGATIVE
PEEP: 14
PEEP: 5
PEEP: 8
PEEP: 8
PH SMN: 6.89 [PH] (ref 7.35–7.45)
PH SMN: 7.16 [PH] (ref 7.35–7.45)
PH SMN: 7.18 [PH] (ref 7.35–7.45)
PH SMN: 7.3 [PH] (ref 7.35–7.45)
PH UR STRIP: 6 [PH] (ref 5–8)
PH UR STRIP: 6 [PH] (ref 5–8)
PH UR STRIP: 7 [PH] (ref 5–8)
PH UR STRIP: 7 [PH] (ref 5–8)
PHOSPHATE SERPL-MCNC: 2.2 MG/DL (ref 2.7–4.5)
PHOSPHATE SERPL-MCNC: 3.7 MG/DL (ref 2.7–4.5)
PHOSPHATE SERPL-MCNC: 5.4 MG/DL (ref 2.7–4.5)
PIP: 33
PLATELET # BLD AUTO: 171 K/UL (ref 150–450)
PLATELET # BLD AUTO: 174 K/UL (ref 150–450)
PLATELET # BLD AUTO: 181 K/UL (ref 150–450)
PLATELET # BLD AUTO: 202 K/UL (ref 150–450)
PLATELET # BLD AUTO: 241 K/UL (ref 150–450)
PLATELET BLD QL SMEAR: ABNORMAL
PLATELET BLD QL SMEAR: ABNORMAL
PMV BLD AUTO: 10.3 FL (ref 9.2–12.9)
PMV BLD AUTO: 10.7 FL (ref 9.2–12.9)
PMV BLD AUTO: 9 FL (ref 9.2–12.9)
PMV BLD AUTO: 9.5 FL (ref 9.2–12.9)
PMV BLD AUTO: 9.6 FL (ref 9.2–12.9)
PO2 BLDA: 68 MMHG (ref 80–100)
PO2 BLDA: 75 MMHG (ref 80–100)
PO2 BLDA: 76 MMHG (ref 80–100)
PO2 BLDA: 91 MMHG (ref 80–100)
POC BE: -13 MMOL/L
POC BE: -13 MMOL/L
POC BE: -14 MMOL/L
POC BE: -27 MMOL/L
POC MOLECULAR INFLUENZA A AGN: NEGATIVE
POC MOLECULAR INFLUENZA B AGN: NEGATIVE
POC SATURATED O2: 88 % (ref 95–100)
POC SATURATED O2: 90 % (ref 95–100)
POC SATURATED O2: 91 % (ref 95–100)
POC SATURATED O2: 92 % (ref 95–100)
POC TCO2: 13 MMOL/L (ref 23–27)
POC TCO2: 15 MMOL/L (ref 23–27)
POC TCO2: 15 MMOL/L (ref 23–27)
POC TCO2: 6 MMOL/L (ref 23–27)
POCT GLUCOSE: 241 MG/DL (ref 70–110)
POCT GLUCOSE: 97 MG/DL (ref 70–110)
POCT GLUCOSE: >500 MG/DL (ref 70–110)
POLYCHROMASIA BLD QL SMEAR: ABNORMAL
POLYCHROMASIA BLD QL SMEAR: ABNORMAL
POTASSIUM SERPL-SCNC: 2.3 MMOL/L (ref 3.5–5.1)
POTASSIUM SERPL-SCNC: 2.4 MMOL/L (ref 3.5–5.1)
POTASSIUM SERPL-SCNC: 2.5 MMOL/L (ref 3.5–5.1)
POTASSIUM SERPL-SCNC: 3.5 MMOL/L (ref 3.5–5.1)
POTASSIUM SERPL-SCNC: 4.1 MMOL/L (ref 3.5–5.1)
POTASSIUM SERPL-SCNC: 4.2 MMOL/L (ref 3.5–5.1)
PROCALCITONIN SERPL IA-MCNC: 0.3 NG/ML
PROT SERPL-MCNC: 4.9 G/DL (ref 6–8.4)
PROT SERPL-MCNC: 5.1 G/DL (ref 6–8.4)
PROT SERPL-MCNC: 5.5 G/DL (ref 6–8.4)
PROT SERPL-MCNC: 6.5 G/DL (ref 6–8.4)
PROT SERPL-MCNC: 6.9 G/DL (ref 6–8.4)
PROT SERPL-MCNC: 7.2 G/DL (ref 6–8.4)
PROT UR QL STRIP: ABNORMAL
PROT UR QL STRIP: NEGATIVE
PROTHROMBIN TIME: 13.5 SEC (ref 9–12.5)
PROTHROMBIN TIME: 13.5 SEC (ref 9–12.5)
RBC # BLD AUTO: 2.97 M/UL (ref 4–5.4)
RBC # BLD AUTO: 3.16 M/UL (ref 4–5.4)
RBC # BLD AUTO: 3.29 M/UL (ref 4–5.4)
RBC # BLD AUTO: 3.4 M/UL (ref 4–5.4)
RBC # BLD AUTO: 3.63 M/UL (ref 4–5.4)
RBC #/AREA URNS HPF: 2 /HPF (ref 0–4)
RBC #/AREA URNS HPF: 6 /HPF (ref 0–4)
RBC #/AREA URNS HPF: >100 /HPF (ref 0–4)
SAMPLE: ABNORMAL
SARS-COV-2 RDRP RESP QL NAA+PROBE: NEGATIVE
SARS-COV-2 RDRP RESP QL NAA+PROBE: NEGATIVE
SARS-COV-2 RDRP RESP QL NAA+PROBE: POSITIVE
SITE: ABNORMAL
SODIUM SERPL-SCNC: 114 MMOL/L (ref 136–145)
SODIUM SERPL-SCNC: 115 MMOL/L (ref 136–145)
SODIUM SERPL-SCNC: 117 MMOL/L (ref 136–145)
SODIUM SERPL-SCNC: 136 MMOL/L (ref 136–145)
SODIUM SERPL-SCNC: 141 MMOL/L (ref 136–145)
SODIUM SERPL-SCNC: 142 MMOL/L (ref 136–145)
SP GR UR STRIP: 1.01 (ref 1–1.03)
SP GR UR STRIP: 1.03 (ref 1–1.03)
SP02: 90
SQUAMOUS #/AREA URNS HPF: 2 /HPF
SQUAMOUS #/AREA URNS HPF: 5 /HPF
T4 FREE SERPL-MCNC: 1.02 NG/DL (ref 0.71–1.51)
TOXICOLOGY INFORMATION: NORMAL
TROPONIN I SERPL DL<=0.01 NG/ML-MCNC: 0.02 NG/ML (ref 0–0.03)
TROPONIN I SERPL DL<=0.01 NG/ML-MCNC: 0.03 NG/ML (ref 0–0.03)
TROPONIN I SERPL DL<=0.01 NG/ML-MCNC: 0.07 NG/ML (ref 0–0.03)
TROPONIN I SERPL DL<=0.01 NG/ML-MCNC: <0.006 NG/ML (ref 0–0.03)
TSH SERPL DL<=0.005 MIU/L-ACNC: 3.16 UIU/ML (ref 0.4–4)
TSH SERPL DL<=0.005 MIU/L-ACNC: 6.64 UIU/ML (ref 0.4–4)
URN SPEC COLLECT METH UR: ABNORMAL
UROBILINOGEN UR STRIP-ACNC: >=8 EU/DL
UROBILINOGEN UR STRIP-ACNC: >=8 EU/DL
UROBILINOGEN UR STRIP-ACNC: ABNORMAL EU/DL
UROBILINOGEN UR STRIP-ACNC: NEGATIVE EU/DL
VIT B1 BLD-MCNC: 21 UG/L (ref 38–122)
VIT B12 SERPL-MCNC: 372 PG/ML (ref 210–950)
VT: 350
VT: 450
VT: 450
VT: 500
WBC # BLD AUTO: 3.81 K/UL (ref 3.9–12.7)
WBC # BLD AUTO: 7.22 K/UL (ref 3.9–12.7)
WBC # BLD AUTO: 7.72 K/UL (ref 3.9–12.7)
WBC # BLD AUTO: 8.52 K/UL (ref 3.9–12.7)
WBC # BLD AUTO: 8.98 K/UL (ref 3.9–12.7)
WBC #/AREA URNS HPF: 13 /HPF (ref 0–5)
WBC #/AREA URNS HPF: 29 /HPF (ref 0–5)
WBC #/AREA URNS HPF: >100 /HPF (ref 0–5)
WBC CLUMPS URNS QL MICRO: ABNORMAL
WBC CLUMPS URNS QL MICRO: ABNORMAL
YEAST URNS QL MICRO: ABNORMAL
YEAST URNS QL MICRO: ABNORMAL

## 2021-01-01 PROCEDURE — 82550 ASSAY OF CK (CPK): CPT | Mod: 91 | Performed by: NURSE PRACTITIONER

## 2021-01-01 PROCEDURE — 93010 ELECTROCARDIOGRAM REPORT: CPT | Mod: ,,, | Performed by: INTERNAL MEDICINE

## 2021-01-01 PROCEDURE — 83690 ASSAY OF LIPASE: CPT | Performed by: EMERGENCY MEDICINE

## 2021-01-01 PROCEDURE — 63600175 PHARM REV CODE 636 W HCPCS: Performed by: EMERGENCY MEDICINE

## 2021-01-01 PROCEDURE — 84100 ASSAY OF PHOSPHORUS: CPT | Performed by: INTERNAL MEDICINE

## 2021-01-01 PROCEDURE — 80053 COMPREHEN METABOLIC PANEL: CPT | Performed by: INTERNAL MEDICINE

## 2021-01-01 PROCEDURE — 97116 GAIT TRAINING THERAPY: CPT | Mod: PN

## 2021-01-01 PROCEDURE — 97110 THERAPEUTIC EXERCISES: CPT | Mod: PN,CQ

## 2021-01-01 PROCEDURE — 36600 WITHDRAWAL OF ARTERIAL BLOOD: CPT

## 2021-01-01 PROCEDURE — 36415 COLL VENOUS BLD VENIPUNCTURE: CPT | Performed by: PHYSICIAN ASSISTANT

## 2021-01-01 PROCEDURE — 63600175 PHARM REV CODE 636 W HCPCS: Performed by: INTERNAL MEDICINE

## 2021-01-01 PROCEDURE — 87086 URINE CULTURE/COLONY COUNT: CPT | Performed by: EMERGENCY MEDICINE

## 2021-01-01 PROCEDURE — 82746 ASSAY OF FOLIC ACID SERUM: CPT | Performed by: PHYSICIAN ASSISTANT

## 2021-01-01 PROCEDURE — 86140 C-REACTIVE PROTEIN: CPT | Performed by: EMERGENCY MEDICINE

## 2021-01-01 PROCEDURE — 81000 URINALYSIS NONAUTO W/SCOPE: CPT | Performed by: PHYSICIAN ASSISTANT

## 2021-01-01 PROCEDURE — 36415 COLL VENOUS BLD VENIPUNCTURE: CPT | Performed by: NURSE PRACTITIONER

## 2021-01-01 PROCEDURE — 99222 PR INITIAL HOSPITAL CARE,LEVL II: ICD-10-PCS | Mod: ,,, | Performed by: PSYCHIATRY & NEUROLOGY

## 2021-01-01 PROCEDURE — 99213 PR OFFICE/OUTPT VISIT, EST, LEVL III, 20-29 MIN: ICD-10-PCS | Mod: ,,, | Performed by: PSYCHIATRY & NEUROLOGY

## 2021-01-01 PROCEDURE — 85007 BL SMEAR W/DIFF WBC COUNT: CPT | Performed by: EMERGENCY MEDICINE

## 2021-01-01 PROCEDURE — 87077 CULTURE AEROBIC IDENTIFY: CPT | Performed by: EMERGENCY MEDICINE

## 2021-01-01 PROCEDURE — 99900035 HC TECH TIME PER 15 MIN (STAT)

## 2021-01-01 PROCEDURE — 84100 ASSAY OF PHOSPHORUS: CPT | Mod: 91 | Performed by: EMERGENCY MEDICINE

## 2021-01-01 PROCEDURE — 63600175 PHARM REV CODE 636 W HCPCS: Performed by: NURSE PRACTITIONER

## 2021-01-01 PROCEDURE — 80053 COMPREHEN METABOLIC PANEL: CPT | Mod: 91 | Performed by: EMERGENCY MEDICINE

## 2021-01-01 PROCEDURE — 82803 BLOOD GASES ANY COMBINATION: CPT

## 2021-01-01 PROCEDURE — U0002 COVID-19 LAB TEST NON-CDC: HCPCS | Performed by: EMERGENCY MEDICINE

## 2021-01-01 PROCEDURE — 25000003 PHARM REV CODE 250: Performed by: PHYSICIAN ASSISTANT

## 2021-01-01 PROCEDURE — 81000 URINALYSIS NONAUTO W/SCOPE: CPT | Performed by: EMERGENCY MEDICINE

## 2021-01-01 PROCEDURE — 87040 BLOOD CULTURE FOR BACTERIA: CPT | Mod: 59 | Performed by: EMERGENCY MEDICINE

## 2021-01-01 PROCEDURE — 99498 ADVNCD CARE PLAN ADDL 30 MIN: CPT | Mod: ,,, | Performed by: INTERNAL MEDICINE

## 2021-01-01 PROCEDURE — 97116 GAIT TRAINING THERAPY: CPT | Mod: PN,CQ

## 2021-01-01 PROCEDURE — 83735 ASSAY OF MAGNESIUM: CPT | Mod: 91 | Performed by: EMERGENCY MEDICINE

## 2021-01-01 PROCEDURE — 83520 IMMUNOASSAY QUANT NOS NONAB: CPT | Performed by: NURSE PRACTITIONER

## 2021-01-01 PROCEDURE — 82077 ASSAY SPEC XCP UR&BREATH IA: CPT | Performed by: PHYSICIAN ASSISTANT

## 2021-01-01 PROCEDURE — 27200966 HC CLOSED SUCTION SYSTEM

## 2021-01-01 PROCEDURE — 80053 COMPREHEN METABOLIC PANEL: CPT | Mod: 91 | Performed by: NURSE PRACTITIONER

## 2021-01-01 PROCEDURE — 84484 ASSAY OF TROPONIN QUANT: CPT | Mod: 91 | Performed by: NURSE PRACTITIONER

## 2021-01-01 PROCEDURE — 82607 VITAMIN B-12: CPT | Performed by: PHYSICIAN ASSISTANT

## 2021-01-01 PROCEDURE — 99285 EMERGENCY DEPT VISIT HI MDM: CPT | Mod: 25

## 2021-01-01 PROCEDURE — 85025 COMPLETE CBC W/AUTO DIFF WBC: CPT | Performed by: PHYSICIAN ASSISTANT

## 2021-01-01 PROCEDURE — 25000003 PHARM REV CODE 250: Performed by: INTERNAL MEDICINE

## 2021-01-01 PROCEDURE — 99223 PR INITIAL HOSPITAL CARE,LEVL III: ICD-10-PCS | Mod: ,,, | Performed by: INTERNAL MEDICINE

## 2021-01-01 PROCEDURE — 93010 EKG 12-LEAD: ICD-10-PCS | Mod: ,,, | Performed by: INTERNAL MEDICINE

## 2021-01-01 PROCEDURE — 97530 THERAPEUTIC ACTIVITIES: CPT

## 2021-01-01 PROCEDURE — 99291 PR CRITICAL CARE, E/M 30-74 MINUTES: ICD-10-PCS | Mod: 25,,, | Performed by: NURSE PRACTITIONER

## 2021-01-01 PROCEDURE — 84484 ASSAY OF TROPONIN QUANT: CPT | Performed by: NURSE PRACTITIONER

## 2021-01-01 PROCEDURE — 97110 THERAPEUTIC EXERCISES: CPT | Mod: PN

## 2021-01-01 PROCEDURE — 83735 ASSAY OF MAGNESIUM: CPT | Performed by: INTERNAL MEDICINE

## 2021-01-01 PROCEDURE — 85379 FIBRIN DEGRADATION QUANT: CPT | Performed by: INTERNAL MEDICINE

## 2021-01-01 PROCEDURE — 36556 INSERT NON-TUNNEL CV CATH: CPT

## 2021-01-01 PROCEDURE — 84439 ASSAY OF FREE THYROXINE: CPT | Performed by: EMERGENCY MEDICINE

## 2021-01-01 PROCEDURE — 99213 OFFICE O/P EST LOW 20 MIN: CPT | Mod: ,,, | Performed by: PSYCHIATRY & NEUROLOGY

## 2021-01-01 PROCEDURE — 37799 UNLISTED PX VASCULAR SURGERY: CPT

## 2021-01-01 PROCEDURE — 99292 CRITICAL CARE ADDL 30 MIN: CPT | Mod: 25,,, | Performed by: NURSE PRACTITIONER

## 2021-01-01 PROCEDURE — 99223 1ST HOSP IP/OBS HIGH 75: CPT | Mod: ,,, | Performed by: INTERNAL MEDICINE

## 2021-01-01 PROCEDURE — 25000003 PHARM REV CODE 250: Performed by: NURSE PRACTITIONER

## 2021-01-01 PROCEDURE — 96376 TX/PRO/DX INJ SAME DRUG ADON: CPT

## 2021-01-01 PROCEDURE — 96372 THER/PROPH/DIAG INJ SC/IM: CPT

## 2021-01-01 PROCEDURE — 83605 ASSAY OF LACTIC ACID: CPT | Mod: 91 | Performed by: NURSE PRACTITIONER

## 2021-01-01 PROCEDURE — 87077 CULTURE AEROBIC IDENTIFY: CPT | Performed by: PHYSICIAN ASSISTANT

## 2021-01-01 PROCEDURE — 25000003 PHARM REV CODE 250

## 2021-01-01 PROCEDURE — 93005 ELECTROCARDIOGRAM TRACING: CPT

## 2021-01-01 PROCEDURE — 96374 THER/PROPH/DIAG INJ IV PUSH: CPT

## 2021-01-01 PROCEDURE — 63600175 PHARM REV CODE 636 W HCPCS: Performed by: PHYSICIAN ASSISTANT

## 2021-01-01 PROCEDURE — 25500020 PHARM REV CODE 255: Performed by: NURSE PRACTITIONER

## 2021-01-01 PROCEDURE — 85610 PROTHROMBIN TIME: CPT | Mod: 91 | Performed by: INTERNAL MEDICINE

## 2021-01-01 PROCEDURE — 82728 ASSAY OF FERRITIN: CPT | Performed by: EMERGENCY MEDICINE

## 2021-01-01 PROCEDURE — 83880 ASSAY OF NATRIURETIC PEPTIDE: CPT | Performed by: EMERGENCY MEDICINE

## 2021-01-01 PROCEDURE — 99283 EMERGENCY DEPT VISIT LOW MDM: CPT

## 2021-01-01 PROCEDURE — 87088 URINE BACTERIA CULTURE: CPT | Performed by: EMERGENCY MEDICINE

## 2021-01-01 PROCEDURE — 81003 URINALYSIS AUTO W/O SCOPE: CPT | Performed by: NURSE PRACTITIONER

## 2021-01-01 PROCEDURE — 99291 CRITICAL CARE FIRST HOUR: CPT | Mod: 25

## 2021-01-01 PROCEDURE — 96365 THER/PROPH/DIAG IV INF INIT: CPT

## 2021-01-01 PROCEDURE — 94002 VENT MGMT INPAT INIT DAY: CPT

## 2021-01-01 PROCEDURE — C9113 INJ PANTOPRAZOLE SODIUM, VIA: HCPCS | Performed by: INTERNAL MEDICINE

## 2021-01-01 PROCEDURE — 63600175 PHARM REV CODE 636 W HCPCS

## 2021-01-01 PROCEDURE — 84145 PROCALCITONIN (PCT): CPT | Performed by: EMERGENCY MEDICINE

## 2021-01-01 PROCEDURE — 85730 THROMBOPLASTIN TIME PARTIAL: CPT | Mod: 91 | Performed by: INTERNAL MEDICINE

## 2021-01-01 PROCEDURE — 81000 URINALYSIS NONAUTO W/SCOPE: CPT | Mod: 59 | Performed by: EMERGENCY MEDICINE

## 2021-01-01 PROCEDURE — 84100 ASSAY OF PHOSPHORUS: CPT | Performed by: PHYSICIAN ASSISTANT

## 2021-01-01 PROCEDURE — 82140 ASSAY OF AMMONIA: CPT | Performed by: PHYSICIAN ASSISTANT

## 2021-01-01 PROCEDURE — 25000003 PHARM REV CODE 250: Performed by: EMERGENCY MEDICINE

## 2021-01-01 PROCEDURE — 85610 PROTHROMBIN TIME: CPT | Performed by: EMERGENCY MEDICINE

## 2021-01-01 PROCEDURE — 82272 OCCULT BLD FECES 1-3 TESTS: CPT

## 2021-01-01 PROCEDURE — 20000000 HC ICU ROOM

## 2021-01-01 PROCEDURE — 87186 SC STD MICRODIL/AGAR DIL: CPT | Performed by: EMERGENCY MEDICINE

## 2021-01-01 PROCEDURE — 96361 HYDRATE IV INFUSION ADD-ON: CPT

## 2021-01-01 PROCEDURE — 94761 N-INVAS EAR/PLS OXIMETRY MLT: CPT

## 2021-01-01 PROCEDURE — 99497 PR ADVNCD CARE PLAN 30 MIN: ICD-10-PCS | Mod: CR,25,, | Performed by: INTERNAL MEDICINE

## 2021-01-01 PROCEDURE — 83615 LACTATE (LD) (LDH) ENZYME: CPT | Performed by: EMERGENCY MEDICINE

## 2021-01-01 PROCEDURE — 84484 ASSAY OF TROPONIN QUANT: CPT | Performed by: EMERGENCY MEDICINE

## 2021-01-01 PROCEDURE — 84425 ASSAY OF VITAMIN B-1: CPT | Performed by: NURSE PRACTITIONER

## 2021-01-01 PROCEDURE — 85652 RBC SED RATE AUTOMATED: CPT | Performed by: INTERNAL MEDICINE

## 2021-01-01 PROCEDURE — U0002 COVID-19 LAB TEST NON-CDC: HCPCS | Performed by: NURSE PRACTITIONER

## 2021-01-01 PROCEDURE — 99284 EMERGENCY DEPT VISIT MOD MDM: CPT | Mod: 25

## 2021-01-01 PROCEDURE — 25500020 PHARM REV CODE 255: Performed by: INTERNAL MEDICINE

## 2021-01-01 PROCEDURE — 36620 PR INSERT CATH,ART,PERCUT,SHORTTERM: ICD-10-PCS | Mod: ,,, | Performed by: NURSE PRACTITIONER

## 2021-01-01 PROCEDURE — 85025 COMPLETE CBC W/AUTO DIFF WBC: CPT | Performed by: EMERGENCY MEDICINE

## 2021-01-01 PROCEDURE — 85007 BL SMEAR W/DIFF WBC COUNT: CPT | Mod: 91 | Performed by: INTERNAL MEDICINE

## 2021-01-01 PROCEDURE — 99291 CRITICAL CARE FIRST HOUR: CPT | Mod: 25,,, | Performed by: NURSE PRACTITIONER

## 2021-01-01 PROCEDURE — 80307 DRUG TEST PRSMV CHEM ANLYZR: CPT | Performed by: EMERGENCY MEDICINE

## 2021-01-01 PROCEDURE — 83735 ASSAY OF MAGNESIUM: CPT | Performed by: PHYSICIAN ASSISTANT

## 2021-01-01 PROCEDURE — G0378 HOSPITAL OBSERVATION PER HR: HCPCS

## 2021-01-01 PROCEDURE — 83690 ASSAY OF LIPASE: CPT | Performed by: NURSE PRACTITIONER

## 2021-01-01 PROCEDURE — 85730 THROMBOPLASTIN TIME PARTIAL: CPT | Performed by: EMERGENCY MEDICINE

## 2021-01-01 PROCEDURE — 84443 ASSAY THYROID STIM HORMONE: CPT | Performed by: EMERGENCY MEDICINE

## 2021-01-01 PROCEDURE — 83605 ASSAY OF LACTIC ACID: CPT | Mod: 91 | Performed by: EMERGENCY MEDICINE

## 2021-01-01 PROCEDURE — 85025 COMPLETE CBC W/AUTO DIFF WBC: CPT | Performed by: NURSE PRACTITIONER

## 2021-01-01 PROCEDURE — 85027 COMPLETE CBC AUTOMATED: CPT | Performed by: EMERGENCY MEDICINE

## 2021-01-01 PROCEDURE — 36620 INSERTION CATHETER ARTERY: CPT | Mod: ,,, | Performed by: NURSE PRACTITIONER

## 2021-01-01 PROCEDURE — 87040 BLOOD CULTURE FOR BACTERIA: CPT | Performed by: EMERGENCY MEDICINE

## 2021-01-01 PROCEDURE — 82962 GLUCOSE BLOOD TEST: CPT

## 2021-01-01 PROCEDURE — 82746 ASSAY OF FOLIC ACID SERUM: CPT | Mod: 91 | Performed by: NURSE PRACTITIONER

## 2021-01-01 PROCEDURE — 97162 PT EVAL MOD COMPLEX 30 MIN: CPT

## 2021-01-01 PROCEDURE — 97166 OT EVAL MOD COMPLEX 45 MIN: CPT

## 2021-01-01 PROCEDURE — 96367 TX/PROPH/DG ADDL SEQ IV INF: CPT

## 2021-01-01 PROCEDURE — 87186 SC STD MICRODIL/AGAR DIL: CPT | Performed by: PHYSICIAN ASSISTANT

## 2021-01-01 PROCEDURE — 99292 PR CRITICAL CARE, ADDL 30 MIN: ICD-10-PCS | Mod: 25,,, | Performed by: NURSE PRACTITIONER

## 2021-01-01 PROCEDURE — 87088 URINE BACTERIA CULTURE: CPT | Performed by: PHYSICIAN ASSISTANT

## 2021-01-01 PROCEDURE — 80053 COMPREHEN METABOLIC PANEL: CPT | Performed by: NURSE PRACTITIONER

## 2021-01-01 PROCEDURE — P9612 CATHETERIZE FOR URINE SPEC: HCPCS

## 2021-01-01 PROCEDURE — 80053 COMPREHEN METABOLIC PANEL: CPT | Performed by: PHYSICIAN ASSISTANT

## 2021-01-01 PROCEDURE — 99497 ADVNCD CARE PLAN 30 MIN: CPT | Mod: CR,25,, | Performed by: INTERNAL MEDICINE

## 2021-01-01 PROCEDURE — 99498 PR ADVNCD CARE PLAN ADDL 30 MIN: ICD-10-PCS | Mod: ,,, | Performed by: INTERNAL MEDICINE

## 2021-01-01 PROCEDURE — 80053 COMPREHEN METABOLIC PANEL: CPT | Performed by: EMERGENCY MEDICINE

## 2021-01-01 PROCEDURE — 99222 1ST HOSP IP/OBS MODERATE 55: CPT | Mod: ,,, | Performed by: PSYCHIATRY & NEUROLOGY

## 2021-01-01 PROCEDURE — 87086 URINE CULTURE/COLONY COUNT: CPT | Performed by: PHYSICIAN ASSISTANT

## 2021-01-01 PROCEDURE — 85027 COMPLETE CBC AUTOMATED: CPT | Mod: 91 | Performed by: INTERNAL MEDICINE

## 2021-01-01 PROCEDURE — 27000221 HC OXYGEN, UP TO 24 HOURS

## 2021-01-01 PROCEDURE — 82550 ASSAY OF CK (CPK): CPT | Performed by: EMERGENCY MEDICINE

## 2021-01-01 PROCEDURE — 96366 THER/PROPH/DIAG IV INF ADDON: CPT

## 2021-01-01 RX ORDER — DOCUSATE SODIUM 100 MG/1
100 CAPSULE, LIQUID FILLED ORAL 2 TIMES DAILY
Qty: 60 CAPSULE | Refills: 0 | Status: SHIPPED | OUTPATIENT
Start: 2021-01-01

## 2021-01-01 RX ORDER — DEXAMETHASONE SODIUM PHOSPHATE 4 MG/ML
6 INJECTION, SOLUTION INTRA-ARTICULAR; INTRALESIONAL; INTRAMUSCULAR; INTRAVENOUS; SOFT TISSUE EVERY 24 HOURS
Status: DISCONTINUED | OUTPATIENT
Start: 2021-01-01 | End: 2021-01-01 | Stop reason: HOSPADM

## 2021-01-01 RX ORDER — ACETAMINOPHEN 325 MG/1
650 TABLET ORAL EVERY 4 HOURS PRN
Status: DISCONTINUED | OUTPATIENT
Start: 2021-01-01 | End: 2021-01-01 | Stop reason: HOSPADM

## 2021-01-01 RX ORDER — MAGNESIUM SULFATE HEPTAHYDRATE 40 MG/ML
2 INJECTION, SOLUTION INTRAVENOUS
Status: DISCONTINUED | OUTPATIENT
Start: 2021-01-01 | End: 2021-01-01 | Stop reason: HOSPADM

## 2021-01-01 RX ORDER — BUSPIRONE HYDROCHLORIDE 10 MG/1
30 TABLET ORAL ONCE
Status: COMPLETED | OUTPATIENT
Start: 2021-01-01 | End: 2021-01-01

## 2021-01-01 RX ORDER — ONDANSETRON 2 MG/ML
4 INJECTION INTRAMUSCULAR; INTRAVENOUS EVERY 8 HOURS PRN
Status: DISCONTINUED | OUTPATIENT
Start: 2021-01-01 | End: 2021-01-01 | Stop reason: HOSPADM

## 2021-01-01 RX ORDER — POTASSIUM CHLORIDE 29.8 MG/ML
40 INJECTION INTRAVENOUS
Status: DISCONTINUED | OUTPATIENT
Start: 2021-01-01 | End: 2021-01-01 | Stop reason: HOSPADM

## 2021-01-01 RX ORDER — FENTANYL CITRATE 50 UG/ML
50 INJECTION, SOLUTION INTRAMUSCULAR; INTRAVENOUS
Status: DISCONTINUED | OUTPATIENT
Start: 2021-07-14 | End: 2021-01-01 | Stop reason: HOSPADM

## 2021-01-01 RX ORDER — GLUCAGON 1 MG
1 KIT INJECTION
Status: DISCONTINUED | OUTPATIENT
Start: 2021-01-01 | End: 2021-01-01 | Stop reason: HOSPADM

## 2021-01-01 RX ORDER — INDOMETHACIN 25 MG/1
CAPSULE ORAL
Status: COMPLETED
Start: 2021-01-01 | End: 2021-01-01

## 2021-01-01 RX ORDER — LANOLIN ALCOHOL/MO/W.PET/CERES
800 CREAM (GRAM) TOPICAL
Status: DISCONTINUED | OUTPATIENT
Start: 2021-01-01 | End: 2021-01-01 | Stop reason: HOSPADM

## 2021-01-01 RX ORDER — SODIUM CHLORIDE 0.9 % (FLUSH) 0.9 %
10 SYRINGE (ML) INJECTION EVERY 8 HOURS
Status: DISCONTINUED | OUTPATIENT
Start: 2021-01-01 | End: 2021-01-01

## 2021-01-01 RX ORDER — TALC
6 POWDER (GRAM) TOPICAL NIGHTLY PRN
Status: DISCONTINUED | OUTPATIENT
Start: 2021-01-01 | End: 2021-01-01 | Stop reason: HOSPADM

## 2021-01-01 RX ORDER — IBUPROFEN 200 MG
16 TABLET ORAL
Status: DISCONTINUED | OUTPATIENT
Start: 2021-01-01 | End: 2021-01-01 | Stop reason: HOSPADM

## 2021-01-01 RX ORDER — LEVOTHYROXINE SODIUM 25 UG/1
25 TABLET ORAL
Status: DISCONTINUED | OUTPATIENT
Start: 2021-01-01 | End: 2021-01-01 | Stop reason: HOSPADM

## 2021-01-01 RX ORDER — SODIUM CHLORIDE 0.9 % (FLUSH) 0.9 %
10 SYRINGE (ML) INJECTION
Status: DISCONTINUED | OUTPATIENT
Start: 2021-01-01 | End: 2021-01-01 | Stop reason: HOSPADM

## 2021-01-01 RX ORDER — CHLORHEXIDINE GLUCONATE ORAL RINSE 1.2 MG/ML
15 SOLUTION DENTAL 2 TIMES DAILY
Status: DISCONTINUED | OUTPATIENT
Start: 2021-01-01 | End: 2021-01-01 | Stop reason: HOSPADM

## 2021-01-01 RX ORDER — ONDANSETRON 4 MG/1
4 TABLET, FILM COATED ORAL EVERY 8 HOURS PRN
Qty: 12 TABLET | Refills: 0 | Status: SHIPPED | OUTPATIENT
Start: 2021-01-01

## 2021-01-01 RX ORDER — FENTANYL CITRATE 50 UG/ML
50 INJECTION, SOLUTION INTRAMUSCULAR; INTRAVENOUS
Status: DISCONTINUED | OUTPATIENT
Start: 2021-01-01 | End: 2021-01-01 | Stop reason: HOSPADM

## 2021-01-01 RX ORDER — INDOMETHACIN 25 MG/1
100 CAPSULE ORAL ONCE
Status: COMPLETED | OUTPATIENT
Start: 2021-01-01 | End: 2021-01-01

## 2021-01-01 RX ORDER — NITROFURANTOIN 25; 75 MG/1; MG/1
100 CAPSULE ORAL 2 TIMES DAILY
Qty: 20 CAPSULE | Refills: 0 | Status: SHIPPED | OUTPATIENT
Start: 2021-01-01 | End: 2021-01-01

## 2021-01-01 RX ORDER — POTASSIUM CHLORIDE 29.8 MG/ML
80 INJECTION INTRAVENOUS
Status: DISCONTINUED | OUTPATIENT
Start: 2021-01-01 | End: 2021-01-01 | Stop reason: HOSPADM

## 2021-01-01 RX ORDER — ERYTHROMYCIN 5 MG/G
OINTMENT OPHTHALMIC
Status: COMPLETED | OUTPATIENT
Start: 2021-01-01 | End: 2021-01-01

## 2021-01-01 RX ORDER — ERYTHROMYCIN 5 MG/G
OINTMENT OPHTHALMIC EVERY 6 HOURS
Qty: 1 TUBE | Refills: 0 | Status: SHIPPED | OUTPATIENT
Start: 2021-01-01 | End: 2021-01-01

## 2021-01-01 RX ORDER — MAGNESIUM SULFATE HEPTAHYDRATE 40 MG/ML
4 INJECTION, SOLUTION INTRAVENOUS
Status: DISCONTINUED | OUTPATIENT
Start: 2021-01-01 | End: 2021-01-01 | Stop reason: HOSPADM

## 2021-01-01 RX ORDER — DICYCLOMINE HYDROCHLORIDE 20 MG/1
20 TABLET ORAL 2 TIMES DAILY PRN
Qty: 10 TABLET | Refills: 0 | Status: ON HOLD | OUTPATIENT
Start: 2021-01-01 | End: 2021-01-01 | Stop reason: HOSPADM

## 2021-01-01 RX ORDER — MORPHINE SULFATE 4 MG/ML
4 INJECTION, SOLUTION INTRAMUSCULAR; INTRAVENOUS
Status: COMPLETED | OUTPATIENT
Start: 2021-01-01 | End: 2021-01-01

## 2021-01-01 RX ORDER — FENTANYL CITRATE-0.9 % NACL/PF 10 MCG/ML
0-200 PLASTIC BAG, INJECTION (ML) INTRAVENOUS CONTINUOUS
Status: DISCONTINUED | OUTPATIENT
Start: 2021-01-01 | End: 2021-01-01 | Stop reason: HOSPADM

## 2021-01-01 RX ORDER — IBUPROFEN 200 MG
24 TABLET ORAL
Status: DISCONTINUED | OUTPATIENT
Start: 2021-01-01 | End: 2021-01-01 | Stop reason: HOSPADM

## 2021-01-01 RX ORDER — ACETAMINOPHEN 650 MG/20.3ML
650 LIQUID ORAL EVERY 6 HOURS
Status: DISCONTINUED | OUTPATIENT
Start: 2021-01-01 | End: 2021-01-01 | Stop reason: HOSPADM

## 2021-01-01 RX ORDER — PANTOPRAZOLE SODIUM 40 MG/10ML
40 INJECTION, POWDER, LYOPHILIZED, FOR SOLUTION INTRAVENOUS DAILY
Status: DISCONTINUED | OUTPATIENT
Start: 2021-01-01 | End: 2021-01-01 | Stop reason: HOSPADM

## 2021-01-01 RX ORDER — AMOXICILLIN AND CLAVULANATE POTASSIUM 875; 125 MG/1; MG/1
1 TABLET, FILM COATED ORAL EVERY 12 HOURS
Qty: 14 TABLET | Refills: 0 | Status: SHIPPED | OUTPATIENT
Start: 2021-01-01 | End: 2021-01-01

## 2021-01-01 RX ORDER — GUAIFENESIN/DEXTROMETHORPHAN 100-5 MG/5
1 LIQUID (ML) ORAL 2 TIMES DAILY
Qty: 130 G | Refills: 0 | Status: SHIPPED | OUTPATIENT
Start: 2021-01-01

## 2021-01-01 RX ORDER — AMOXICILLIN 250 MG
1 CAPSULE ORAL 2 TIMES DAILY
Status: DISCONTINUED | OUTPATIENT
Start: 2021-01-01 | End: 2021-01-01

## 2021-01-01 RX ORDER — SODIUM CHLORIDE 0.9 % (FLUSH) 0.9 %
10 SYRINGE (ML) INJECTION
Status: DISCONTINUED | OUTPATIENT
Start: 2021-01-01 | End: 2021-01-01

## 2021-01-01 RX ORDER — FAMOTIDINE 10 MG/ML
20 INJECTION INTRAVENOUS 2 TIMES DAILY
Status: DISCONTINUED | OUTPATIENT
Start: 2021-01-01 | End: 2021-01-01

## 2021-01-01 RX ORDER — MORPHINE SULFATE 4 MG/ML
2 INJECTION, SOLUTION INTRAMUSCULAR; INTRAVENOUS EVERY 4 HOURS PRN
Status: DISCONTINUED | OUTPATIENT
Start: 2021-01-01 | End: 2021-01-01 | Stop reason: HOSPADM

## 2021-01-01 RX ORDER — LEVOTHYROXINE SODIUM 25 UG/1
25 TABLET ORAL
Qty: 30 TABLET | Refills: 11 | Status: SHIPPED | OUTPATIENT
Start: 2021-01-01 | End: 2022-06-02

## 2021-01-01 RX ORDER — NOREPINEPHRINE BITARTRATE/D5W 4MG/250ML
PLASTIC BAG, INJECTION (ML) INTRAVENOUS
Status: DISPENSED
Start: 2021-01-01 | End: 2021-01-01

## 2021-01-01 RX ORDER — NITROFURANTOIN 25; 75 MG/1; MG/1
100 CAPSULE ORAL EVERY 12 HOURS
Status: DISCONTINUED | OUTPATIENT
Start: 2021-01-01 | End: 2021-01-01 | Stop reason: HOSPADM

## 2021-01-01 RX ORDER — SODIUM CHLORIDE 9 MG/ML
INJECTION, SOLUTION INTRAVENOUS CONTINUOUS
Status: DISCONTINUED | OUTPATIENT
Start: 2021-01-01 | End: 2021-01-01

## 2021-01-01 RX ORDER — MUPIROCIN 20 MG/G
OINTMENT TOPICAL 2 TIMES DAILY
Status: DISCONTINUED | OUTPATIENT
Start: 2021-01-01 | End: 2021-01-01 | Stop reason: HOSPADM

## 2021-01-01 RX ORDER — AMOXICILLIN 250 MG
1 CAPSULE ORAL DAILY PRN
Status: DISCONTINUED | OUTPATIENT
Start: 2021-01-01 | End: 2021-01-01 | Stop reason: HOSPADM

## 2021-01-01 RX ORDER — POTASSIUM CHLORIDE 14.9 MG/ML
60 INJECTION INTRAVENOUS
Status: DISCONTINUED | OUTPATIENT
Start: 2021-01-01 | End: 2021-01-01 | Stop reason: HOSPADM

## 2021-01-01 RX ORDER — SODIUM CHLORIDE AND POTASSIUM CHLORIDE 150; 900 MG/100ML; MG/100ML
INJECTION, SOLUTION INTRAVENOUS
Status: COMPLETED | OUTPATIENT
Start: 2021-01-01 | End: 2021-01-01

## 2021-01-01 RX ORDER — INDOMETHACIN 25 MG/1
150 CAPSULE ORAL ONCE
Status: COMPLETED | OUTPATIENT
Start: 2021-01-01 | End: 2021-01-01

## 2021-01-01 RX ORDER — DEXAMETHASONE SODIUM PHOSPHATE 4 MG/ML
12 INJECTION, SOLUTION INTRA-ARTICULAR; INTRALESIONAL; INTRAMUSCULAR; INTRAVENOUS; SOFT TISSUE
Status: COMPLETED | OUTPATIENT
Start: 2021-01-01 | End: 2021-01-01

## 2021-01-01 RX ORDER — NOREPINEPHRINE BITARTRATE/D5W 4MG/250ML
0-3 PLASTIC BAG, INJECTION (ML) INTRAVENOUS CONTINUOUS
Status: DISCONTINUED | OUTPATIENT
Start: 2021-01-01 | End: 2021-01-01

## 2021-01-01 RX ORDER — MORPHINE SULFATE 4 MG/ML
4 INJECTION, SOLUTION INTRAMUSCULAR; INTRAVENOUS EVERY 4 HOURS PRN
Status: DISCONTINUED | OUTPATIENT
Start: 2021-01-01 | End: 2021-01-01 | Stop reason: HOSPADM

## 2021-01-01 RX ORDER — NOREPINEPHRINE BITARTRATE/D5W 4MG/250ML
PLASTIC BAG, INJECTION (ML) INTRAVENOUS
Status: COMPLETED
Start: 2021-01-01 | End: 2021-01-01

## 2021-01-01 RX ORDER — AMLODIPINE BESYLATE 5 MG/1
5 TABLET ORAL
Status: DISCONTINUED | OUTPATIENT
Start: 2021-01-01 | End: 2021-01-01 | Stop reason: HOSPADM

## 2021-01-01 RX ORDER — MICONAZOLE NITRATE 2 %
POWDER (GRAM) TOPICAL
Status: COMPLETED | OUTPATIENT
Start: 2021-01-01 | End: 2021-01-01

## 2021-01-01 RX ADMIN — PIPERACILLIN AND TAZOBACTAM 4.5 G: 4; .5 INJECTION, POWDER, LYOPHILIZED, FOR SOLUTION INTRAVENOUS; PARENTERAL at 10:06

## 2021-01-01 RX ADMIN — NITROFURANTOIN (MONOHYDRATE/MACROCRYSTALS) 100 MG: 75; 25 CAPSULE ORAL at 08:06

## 2021-01-01 RX ADMIN — SODIUM CHLORIDE AND POTASSIUM CHLORIDE: 9; 1.49 INJECTION, SOLUTION INTRAVENOUS at 02:07

## 2021-01-01 RX ADMIN — SODIUM BICARBONATE 100 MEQ: 84 INJECTION, SOLUTION INTRAVENOUS at 11:07

## 2021-01-01 RX ADMIN — AMIODARONE HYDROCHLORIDE 150 MG: 1.5 INJECTION, SOLUTION INTRAVENOUS at 05:07

## 2021-01-01 RX ADMIN — IOHEXOL 75 ML: 350 INJECTION, SOLUTION INTRAVENOUS at 02:05

## 2021-01-01 RX ADMIN — MAGNESIUM SULFATE 2 G: 2 INJECTION INTRAVENOUS at 10:07

## 2021-01-01 RX ADMIN — BUSPIRONE HYDROCHLORIDE 30 MG: 10 TABLET ORAL at 09:07

## 2021-01-01 RX ADMIN — AZITHROMYCIN MONOHYDRATE 500 MG: 500 INJECTION, POWDER, LYOPHILIZED, FOR SOLUTION INTRAVENOUS at 11:07

## 2021-01-01 RX ADMIN — PIPERACILLIN AND TAZOBACTAM 4.5 G: 4; .5 INJECTION, POWDER, LYOPHILIZED, FOR SOLUTION INTRAVENOUS; PARENTERAL at 02:06

## 2021-01-01 RX ADMIN — PANTOPRAZOLE SODIUM 40 MG: 40 INJECTION, POWDER, FOR SOLUTION INTRAVENOUS at 08:07

## 2021-01-01 RX ADMIN — DEXTROSE 0.3 MCG/KG/MIN: 5 SOLUTION INTRAVENOUS at 03:07

## 2021-01-01 RX ADMIN — CEFTRIAXONE 2 G: 2 INJECTION, SOLUTION INTRAVENOUS at 08:07

## 2021-01-01 RX ADMIN — VANCOMYCIN HYDROCHLORIDE 1750 MG: 10 INJECTION, POWDER, LYOPHILIZED, FOR SOLUTION INTRAVENOUS at 10:07

## 2021-01-01 RX ADMIN — DEXAMETHASONE SODIUM PHOSPHATE 12 MG: 4 INJECTION INTRA-ARTICULAR; INTRALESIONAL; INTRAMUSCULAR; INTRAVENOUS; SOFT TISSUE at 10:07

## 2021-01-01 RX ADMIN — POTASSIUM CHLORIDE 40 MEQ: 29.8 INJECTION, SOLUTION INTRAVENOUS at 10:07

## 2021-01-01 RX ADMIN — FENTANYL CITRATE 50 MCG: 50 INJECTION INTRAMUSCULAR; INTRAVENOUS at 08:07

## 2021-01-01 RX ADMIN — Medication 1 MCG/KG/MIN: at 10:07

## 2021-01-01 RX ADMIN — CEFTRIAXONE SODIUM 500 MG: 1 INJECTION, POWDER, FOR SOLUTION INTRAMUSCULAR; INTRAVENOUS at 08:06

## 2021-01-01 RX ADMIN — Medication 25 MCG/HR: at 10:07

## 2021-01-01 RX ADMIN — AMIODARONE HYDROCHLORIDE 1 MG/MIN: 1.8 INJECTION, SOLUTION INTRAVENOUS at 11:07

## 2021-01-01 RX ADMIN — SODIUM CHLORIDE 3 UNITS/HR: 9 INJECTION, SOLUTION INTRAVENOUS at 09:07

## 2021-01-01 RX ADMIN — CEFTRIAXONE 2 G: 2 INJECTION, SOLUTION INTRAVENOUS at 05:05

## 2021-01-01 RX ADMIN — LEVOTHYROXINE SODIUM 25 MCG: 25 TABLET ORAL at 06:06

## 2021-01-01 RX ADMIN — SODIUM CHLORIDE, SODIUM LACTATE, POTASSIUM CHLORIDE, AND CALCIUM CHLORIDE 1250 ML: .6; .31; .03; .02 INJECTION, SOLUTION INTRAVENOUS at 12:07

## 2021-01-01 RX ADMIN — INDOMETHACIN 100 MEQ: 25 CAPSULE ORAL at 11:07

## 2021-01-01 RX ADMIN — ERYTHROMYCIN 1 INCH: 5 OINTMENT OPHTHALMIC at 01:05

## 2021-01-01 RX ADMIN — SODIUM CHLORIDE 1000 ML: 0.9 INJECTION, SOLUTION INTRAVENOUS at 01:05

## 2021-01-01 RX ADMIN — POTASSIUM CHLORIDE 40 MEQ: 29.8 INJECTION, SOLUTION INTRAVENOUS at 12:07

## 2021-01-01 RX ADMIN — SODIUM BICARBONATE 150 MEQ: 84 INJECTION, SOLUTION INTRAVENOUS at 11:07

## 2021-01-01 RX ADMIN — AMIODARONE HYDROCHLORIDE: 1.5 INJECTION, SOLUTION INTRAVENOUS at 06:07

## 2021-01-01 RX ADMIN — MORPHINE SULFATE 4 MG: 4 INJECTION, SOLUTION INTRAMUSCULAR; INTRAVENOUS at 01:05

## 2021-01-01 RX ADMIN — PIPERACILLIN AND TAZOBACTAM 4.5 G: 4; .5 INJECTION, POWDER, LYOPHILIZED, FOR SOLUTION INTRAVENOUS; PARENTERAL at 06:05

## 2021-01-01 RX ADMIN — SODIUM CHLORIDE: 0.9 INJECTION, SOLUTION INTRAVENOUS at 03:07

## 2021-01-01 RX ADMIN — 0.12% CHLORHEXIDINE GLUCONATE 15 ML: 1.2 RINSE ORAL at 08:07

## 2021-01-01 RX ADMIN — MAGNESIUM SULFATE 2 G: 2 INJECTION INTRAVENOUS at 11:07

## 2021-01-01 RX ADMIN — PIPERACILLIN AND TAZOBACTAM 4.5 G: 4; .5 INJECTION, POWDER, LYOPHILIZED, FOR SOLUTION INTRAVENOUS; PARENTERAL at 11:07

## 2021-01-01 RX ADMIN — ACETAMINOPHEN 650 MG: 325 TABLET ORAL at 10:06

## 2021-01-01 RX ADMIN — INDOMETHACIN 150 MEQ: 25 CAPSULE ORAL at 11:07

## 2021-01-01 RX ADMIN — DEXAMETHASONE SODIUM PHOSPHATE 6 MG: 4 INJECTION INTRA-ARTICULAR; INTRALESIONAL; INTRAMUSCULAR; INTRAVENOUS; SOFT TISSUE at 03:07

## 2021-01-01 RX ADMIN — SODIUM CHLORIDE 1000 ML: 0.9 INJECTION, SOLUTION INTRAVENOUS at 04:05

## 2021-01-01 RX ADMIN — ANTI-FUNGAL POWDER MICONAZOLE NITRATE TALC FREE: 1.42 POWDER TOPICAL at 07:03

## 2021-01-01 RX ADMIN — VASOPRESSIN 0.04 UNITS/MIN: 20 INJECTION INTRAVENOUS at 11:07

## 2021-01-01 RX ADMIN — IOHEXOL 100 ML: 350 INJECTION, SOLUTION INTRAVENOUS at 02:07

## 2021-01-01 RX ADMIN — AMIODARONE HYDROCHLORIDE 1 MG/MIN: 1.8 INJECTION, SOLUTION INTRAVENOUS at 05:07

## 2021-05-31 PROBLEM — R44.1 VISUAL HALLUCINATIONS: Status: ACTIVE | Noted: 2021-01-01

## 2021-05-31 PROBLEM — N39.0 UTI (URINARY TRACT INFECTION): Status: ACTIVE | Noted: 2021-01-01

## 2021-05-31 PROBLEM — E03.8 SUBCLINICAL HYPOTHYROIDISM: Status: ACTIVE | Noted: 2021-01-01

## 2021-07-12 PROBLEM — Z71.89 ADVANCE CARE PLANNING: Status: ACTIVE | Noted: 2021-01-01

## 2021-07-12 PROBLEM — I46.9 CARDIAC ARREST: Status: ACTIVE | Noted: 2021-01-01

## 2021-07-12 PROBLEM — R57.9 SHOCK: Status: ACTIVE | Noted: 2021-01-01

## 2021-07-12 PROBLEM — E87.1 HYPONATREMIA: Status: ACTIVE | Noted: 2021-01-01

## 2021-07-12 PROBLEM — E11.65 HYPERGLYCEMIA DUE TO DIABETES MELLITUS: Status: ACTIVE | Noted: 2021-01-01

## 2021-07-12 PROBLEM — U07.1 COVID-19: Status: ACTIVE | Noted: 2021-01-01

## 2021-07-12 PROBLEM — J96.00 ACUTE RESPIRATORY FAILURE: Status: ACTIVE | Noted: 2021-01-01

## 2021-07-13 LAB
BACTERIA BLD CULT: ABNORMAL
NSE SERPL-MCNC: 44 NG/ML

## 2021-07-14 LAB — BACTERIA UR CULT: NO GROWTH

## 2021-07-16 LAB — BACTERIA BLD CULT: NORMAL

## 2021-08-26 NOTE — PROGRESS NOTES
Missed Visit/Cancellation      Date: 7/8/2020         Canceled Number: 3  No Show Number: 1     Pt initially had visit scheduled for today for 0930  Reason for cancellation: knees swollen  Pt's next scheduled physical therapy visit is 7/13/2020     Yelena Ferrer,DPT  7/8/2020    
(0) No drift; limb holds 90 (or 45) degrees for full 10 secs